# Patient Record
Sex: FEMALE | Race: WHITE | NOT HISPANIC OR LATINO | Employment: OTHER | URBAN - NONMETROPOLITAN AREA
[De-identification: names, ages, dates, MRNs, and addresses within clinical notes are randomized per-mention and may not be internally consistent; named-entity substitution may affect disease eponyms.]

---

## 2017-01-05 ENCOUNTER — TRANSCRIBE ORDERS (OUTPATIENT)
Dept: ADMINISTRATIVE | Facility: HOSPITAL | Age: 72
End: 2017-01-05

## 2017-01-05 ENCOUNTER — LAB (OUTPATIENT)
Dept: LAB | Facility: HOSPITAL | Age: 72
End: 2017-01-05
Attending: INTERNAL MEDICINE

## 2017-01-05 DIAGNOSIS — D37.6 NEOPLASM OF UNCERTAIN BEHAVIOR OF LIVER AND BILIARY PASSAGES: ICD-10-CM

## 2017-01-05 DIAGNOSIS — C22.1 MALIGNANT NEOPLASM OF INTRAHEPATIC BILE DUCTS (HCC): ICD-10-CM

## 2017-01-05 DIAGNOSIS — D37.6 NEOPLASM OF UNCERTAIN BEHAVIOR OF LIVER AND BILIARY PASSAGES: Primary | ICD-10-CM

## 2017-01-05 LAB
ALBUMIN SERPL-MCNC: 4 G/DL (ref 3.5–5)
ALBUMIN/GLOB SERPL: 1.2 G/DL (ref 1.1–2.5)
ALP SERPL-CCNC: 347 U/L (ref 24–120)
ALT SERPL W P-5'-P-CCNC: 95 U/L (ref 0–54)
ANION GAP SERPL CALCULATED.3IONS-SCNC: 11 MMOL/L (ref 4–13)
AST SERPL-CCNC: 88 U/L (ref 7–45)
AUTO MIXED CELLS #: 0.2 10*3/UL (ref 0.1–2.6)
AUTO MIXED CELLS %: 3.2 % (ref 0.1–24)
BILIRUB CONJ SERPL-MCNC: 0 MG/DL (ref 0–0.3)
BILIRUB SERPL-MCNC: 0.4 MG/DL (ref 0.1–1)
BUN BLD-MCNC: 9 MG/DL (ref 5–21)
BUN/CREAT SERPL: 17.6
CALCIUM SPEC-SCNC: 8.4 MG/DL (ref 8.4–10.4)
CHLORIDE SERPL-SCNC: 99 MMOL/L (ref 98–110)
CO2 SERPL-SCNC: 29 MMOL/L (ref 24–31)
CREAT BLD-MCNC: 0.51 MG/DL (ref 0.5–1.4)
ERYTHROCYTE [DISTWIDTH] IN BLOOD BY AUTOMATED COUNT: 13.2 % (ref 12–15)
GFR SERPL CREATININE-BSD FRML MDRD: 120 ML/MIN/1.73
GLOBULIN UR ELPH-MCNC: 3.3 GM/DL
GLUCOSE BLD-MCNC: 187 MG/DL (ref 70–100)
HCT VFR BLD AUTO: 35.3 % (ref 37–47)
HGB BLD-MCNC: 12.2 G/DL (ref 12–16)
LYMPHOCYTES # BLD AUTO: 1 10*3/MM3 (ref 0.8–7)
LYMPHOCYTES NFR BLD AUTO: 19.7 % (ref 15–45)
MCH RBC QN AUTO: 30.7 PG (ref 28–32)
MCHC RBC AUTO-ENTMCNC: 34.6 G/DL (ref 33–36)
MCV RBC AUTO: 88.9 FL (ref 82–98)
NEUTROPHILS # BLD AUTO: 3.8 10*3/MM3 (ref 1.5–8.3)
NEUTROPHILS NFR BLD AUTO: 77.1 % (ref 39–78)
PLATELET # BLD AUTO: 226 10*3/MM3 (ref 130–400)
PMV BLD AUTO: 9.9 FL (ref 6–12)
POTASSIUM BLD-SCNC: 4 MMOL/L (ref 3.5–5.3)
PROT SERPL-MCNC: 7.3 G/DL (ref 6.3–8.7)
RBC # BLD AUTO: 3.97 10*6/MM3 (ref 4.2–5.4)
SODIUM BLD-SCNC: 139 MMOL/L (ref 135–145)
WBC NRBC COR # BLD: 5 10*3/MM3 (ref 4.8–10.8)

## 2017-01-05 PROCEDURE — 36415 COLL VENOUS BLD VENIPUNCTURE: CPT | Performed by: INTERNAL MEDICINE

## 2017-01-05 PROCEDURE — 80053 COMPREHEN METABOLIC PANEL: CPT

## 2017-01-05 PROCEDURE — 82248 BILIRUBIN DIRECT: CPT

## 2017-01-05 PROCEDURE — 85025 COMPLETE CBC W/AUTO DIFF WBC: CPT

## 2017-01-05 PROCEDURE — 82378 CARCINOEMBRYONIC ANTIGEN: CPT | Performed by: INTERNAL MEDICINE

## 2017-01-05 PROCEDURE — 86301 IMMUNOASSAY TUMOR CA 19-9: CPT | Performed by: INTERNAL MEDICINE

## 2017-01-27 ENCOUNTER — TRANSCRIBE ORDERS (OUTPATIENT)
Dept: GENERAL RADIOLOGY | Facility: HOSPITAL | Age: 72
End: 2017-01-27

## 2017-01-27 ENCOUNTER — LAB (OUTPATIENT)
Dept: LAB | Facility: HOSPITAL | Age: 72
End: 2017-01-27
Attending: INTERNAL MEDICINE

## 2017-01-27 DIAGNOSIS — D64.9 ANEMIA, UNSPECIFIED: ICD-10-CM

## 2017-01-27 DIAGNOSIS — D37.6 NEOPLASM OF UNCERTAIN BEHAVIOR OF LIVER AND BILIARY PASSAGES: ICD-10-CM

## 2017-01-27 DIAGNOSIS — D64.9 ANEMIA, UNSPECIFIED: Primary | ICD-10-CM

## 2017-01-27 LAB
ALBUMIN SERPL-MCNC: 4.2 G/DL (ref 3.5–5)
ALBUMIN/GLOB SERPL: 1.4 G/DL (ref 1.1–2.5)
ALP SERPL-CCNC: 250 U/L (ref 24–120)
ALT SERPL W P-5'-P-CCNC: 225 U/L (ref 0–54)
ANION GAP SERPL CALCULATED.3IONS-SCNC: 10 MMOL/L (ref 4–13)
AST SERPL-CCNC: 328 U/L (ref 7–45)
AUTO MIXED CELLS #: 0.4 10*3/UL (ref 0.1–2.6)
AUTO MIXED CELLS %: 9.9 % (ref 0.1–24)
BILIRUB CONJ SERPL-MCNC: 0 MG/DL (ref 0–0.3)
BILIRUB SERPL-MCNC: 0.7 MG/DL (ref 0.1–1)
BUN BLD-MCNC: 9 MG/DL (ref 5–21)
BUN/CREAT SERPL: 17.6
CALCIUM SPEC-SCNC: 8.6 MG/DL (ref 8.4–10.4)
CEA SERPL-MCNC: 3.47 NG/ML (ref 0–5)
CHLORIDE SERPL-SCNC: 98 MMOL/L (ref 98–110)
CO2 SERPL-SCNC: 30 MMOL/L (ref 24–31)
CREAT BLD-MCNC: 0.51 MG/DL (ref 0.5–1.4)
ERYTHROCYTE [DISTWIDTH] IN BLOOD BY AUTOMATED COUNT: 13.4 % (ref 12–15)
GFR SERPL CREATININE-BSD FRML MDRD: 120 ML/MIN/1.73
GLOBULIN UR ELPH-MCNC: 3 GM/DL
GLUCOSE BLD-MCNC: 86 MG/DL (ref 70–100)
HCT VFR BLD AUTO: 35.3 % (ref 37–47)
HGB BLD-MCNC: 12.3 G/DL (ref 12–16)
LYMPHOCYTES # BLD AUTO: 0.8 10*3/MM3 (ref 0.8–7)
LYMPHOCYTES NFR BLD AUTO: 19.3 % (ref 15–45)
MCH RBC QN AUTO: 31.1 PG (ref 28–32)
MCHC RBC AUTO-ENTMCNC: 34.8 G/DL (ref 33–36)
MCV RBC AUTO: 89.1 FL (ref 82–98)
NEUTROPHILS # BLD AUTO: 3.2 10*3/MM3 (ref 1.5–8.3)
NEUTROPHILS NFR BLD AUTO: 70.8 % (ref 39–78)
PLATELET # BLD AUTO: 188 10*3/MM3 (ref 130–400)
PMV BLD AUTO: 10.5 FL (ref 6–12)
POTASSIUM BLD-SCNC: 4.7 MMOL/L (ref 3.5–5.3)
PROT SERPL-MCNC: 7.2 G/DL (ref 6.3–8.7)
RBC # BLD AUTO: 3.96 10*6/MM3 (ref 4.2–5.4)
SODIUM BLD-SCNC: 138 MMOL/L (ref 135–145)
WBC NRBC COR # BLD: 4.4 10*3/MM3 (ref 4.8–10.8)

## 2017-01-27 PROCEDURE — 82248 BILIRUBIN DIRECT: CPT

## 2017-01-27 PROCEDURE — 85025 COMPLETE CBC W/AUTO DIFF WBC: CPT | Performed by: INTERNAL MEDICINE

## 2017-01-27 PROCEDURE — 82378 CARCINOEMBRYONIC ANTIGEN: CPT | Performed by: INTERNAL MEDICINE

## 2017-01-27 PROCEDURE — 36415 COLL VENOUS BLD VENIPUNCTURE: CPT

## 2017-01-27 PROCEDURE — 80053 COMPREHEN METABOLIC PANEL: CPT | Performed by: INTERNAL MEDICINE

## 2017-01-27 PROCEDURE — 86301 IMMUNOASSAY TUMOR CA 19-9: CPT | Performed by: INTERNAL MEDICINE

## 2017-01-28 LAB — CANCER AG19-9 SERPL-ACNC: 54 U/ML (ref 0–35)

## 2017-02-15 ENCOUNTER — LAB (OUTPATIENT)
Dept: LAB | Facility: HOSPITAL | Age: 72
End: 2017-02-15

## 2017-02-15 ENCOUNTER — TRANSCRIBE ORDERS (OUTPATIENT)
Dept: GENERAL RADIOLOGY | Facility: HOSPITAL | Age: 72
End: 2017-02-15

## 2017-02-15 DIAGNOSIS — D64.9 ANEMIA, UNSPECIFIED: ICD-10-CM

## 2017-02-15 DIAGNOSIS — D37.6 NEOPLASM OF UNCERTAIN BEHAVIOR OF LIVER, GALLBLADDER AND BILE DUCTS: Primary | ICD-10-CM

## 2017-02-15 DIAGNOSIS — D37.6 NEOPLASM OF UNCERTAIN BEHAVIOR OF LIVER, GALLBLADDER AND BILE DUCTS: ICD-10-CM

## 2017-02-15 LAB
ALBUMIN SERPL-MCNC: 3.8 G/DL (ref 3.5–5)
ALBUMIN SERPL-MCNC: 3.8 G/DL (ref 3.5–5)
ALBUMIN/GLOB SERPL: 1.2 G/DL (ref 1.1–2.5)
ALP SERPL-CCNC: 230 U/L (ref 24–120)
ALP SERPL-CCNC: 230 U/L (ref 24–120)
ALT SERPL W P-5'-P-CCNC: 222 U/L (ref 0–54)
ALT SERPL W P-5'-P-CCNC: 222 U/L (ref 0–54)
ANION GAP SERPL CALCULATED.3IONS-SCNC: 11 MMOL/L (ref 4–13)
AST SERPL-CCNC: 390 U/L (ref 7–45)
AST SERPL-CCNC: 390 U/L (ref 7–45)
BASOPHILS # BLD AUTO: 0.01 10*3/MM3 (ref 0–0.2)
BASOPHILS NFR BLD AUTO: 0.2 % (ref 0–2)
BILIRUB CONJ SERPL-MCNC: 0 MG/DL (ref 0–0.3)
BILIRUB INDIRECT SERPL-MCNC: 0.3 MG/DL (ref 0–1.1)
BILIRUB SERPL-MCNC: 0.8 MG/DL (ref 0.1–1)
BILIRUB SERPL-MCNC: 0.8 MG/DL (ref 0.1–1)
BUN BLD-MCNC: 15 MG/DL (ref 5–21)
BUN/CREAT SERPL: 28.3
CALCIUM SPEC-SCNC: 8.5 MG/DL (ref 8.4–10.4)
CEA SERPL-MCNC: 3.43 NG/ML (ref 0–5)
CHLORIDE SERPL-SCNC: 102 MMOL/L (ref 98–110)
CO2 SERPL-SCNC: 27 MMOL/L (ref 24–31)
CREAT BLD-MCNC: 0.53 MG/DL (ref 0.5–1.4)
DEPRECATED RDW RBC AUTO: 45.2 FL (ref 40–54)
EOSINOPHIL # BLD AUTO: 0.07 10*3/MM3 (ref 0–0.7)
EOSINOPHIL NFR BLD AUTO: 1.4 % (ref 0–4)
ERYTHROCYTE [DISTWIDTH] IN BLOOD BY AUTOMATED COUNT: 13.7 % (ref 12–15)
GFR SERPL CREATININE-BSD FRML MDRD: 114 ML/MIN/1.73
GLOBULIN UR ELPH-MCNC: 3.2 GM/DL
GLUCOSE BLD-MCNC: 112 MG/DL (ref 70–100)
HCT VFR BLD AUTO: 35.6 % (ref 37–47)
HGB BLD-MCNC: 11.9 G/DL (ref 12–16)
IMM GRANULOCYTES # BLD: 0.01 10*3/MM3 (ref 0–0.03)
IMM GRANULOCYTES NFR BLD: 0.2 % (ref 0–5)
LYMPHOCYTES # BLD AUTO: 1.1 10*3/MM3 (ref 0.72–4.86)
LYMPHOCYTES NFR BLD AUTO: 22 % (ref 15–45)
MCH RBC QN AUTO: 30.1 PG (ref 28–32)
MCHC RBC AUTO-ENTMCNC: 33.4 G/DL (ref 33–36)
MCV RBC AUTO: 89.9 FL (ref 82–98)
MONOCYTES # BLD AUTO: 0.63 10*3/MM3 (ref 0.19–1.3)
MONOCYTES NFR BLD AUTO: 12.6 % (ref 4–12)
NEUTROPHILS # BLD AUTO: 3.18 10*3/MM3 (ref 1.87–8.4)
NEUTROPHILS NFR BLD AUTO: 63.6 % (ref 39–78)
PLATELET # BLD AUTO: 223 10*3/MM3 (ref 130–400)
PMV BLD AUTO: 11.9 FL (ref 6–12)
POTASSIUM BLD-SCNC: 4.5 MMOL/L (ref 3.5–5.3)
PROT SERPL-MCNC: 7 G/DL (ref 6.3–8.7)
PROT SERPL-MCNC: 7 G/DL (ref 6.3–8.7)
RBC # BLD AUTO: 3.96 10*6/MM3 (ref 4.2–5.4)
SODIUM BLD-SCNC: 140 MMOL/L (ref 135–145)
WBC NRBC COR # BLD: 5 10*3/MM3 (ref 4.8–10.8)

## 2017-02-15 PROCEDURE — 86301 IMMUNOASSAY TUMOR CA 19-9: CPT | Performed by: INTERNAL MEDICINE

## 2017-02-15 PROCEDURE — 80076 HEPATIC FUNCTION PANEL: CPT

## 2017-02-15 PROCEDURE — 36415 COLL VENOUS BLD VENIPUNCTURE: CPT

## 2017-02-15 PROCEDURE — 80053 COMPREHEN METABOLIC PANEL: CPT

## 2017-02-15 PROCEDURE — 82378 CARCINOEMBRYONIC ANTIGEN: CPT | Performed by: INTERNAL MEDICINE

## 2017-02-15 PROCEDURE — 85025 COMPLETE CBC W/AUTO DIFF WBC: CPT | Performed by: INTERNAL MEDICINE

## 2017-02-15 PROCEDURE — 82248 BILIRUBIN DIRECT: CPT

## 2017-02-17 LAB — CANCER AG19-9 SERPL-ACNC: 62 U/ML (ref 0–35)

## 2018-03-02 ENCOUNTER — TRANSCRIBE ORDERS (OUTPATIENT)
Dept: ADMINISTRATIVE | Facility: HOSPITAL | Age: 73
End: 2018-03-02

## 2018-03-02 ENCOUNTER — LAB (OUTPATIENT)
Dept: LAB | Facility: HOSPITAL | Age: 73
End: 2018-03-02
Attending: INTERNAL MEDICINE

## 2018-03-02 DIAGNOSIS — R94.5 ABNORMAL RESULTS OF LIVER FUNCTION STUDIES: Primary | ICD-10-CM

## 2018-03-02 DIAGNOSIS — R94.5 ABNORMAL RESULTS OF LIVER FUNCTION STUDIES: ICD-10-CM

## 2018-03-02 LAB
ALBUMIN SERPL-MCNC: 4.1 G/DL (ref 3.5–5)
ALBUMIN/GLOB SERPL: 1.1 G/DL (ref 1.1–2.5)
ALP SERPL-CCNC: 467 U/L (ref 24–120)
ALT SERPL W P-5'-P-CCNC: 195 U/L (ref 0–54)
ANION GAP SERPL CALCULATED.3IONS-SCNC: 11 MMOL/L (ref 4–13)
AST SERPL-CCNC: 191 U/L (ref 7–45)
BACTERIA UR QL AUTO: ABNORMAL /HPF
BILIRUB CONJ SERPL-MCNC: 0 MG/DL (ref 0–0.3)
BILIRUB SERPL-MCNC: 2 MG/DL (ref 0.1–1)
BILIRUB UR QL STRIP: ABNORMAL
BUN BLD-MCNC: 11 MG/DL (ref 5–21)
BUN/CREAT SERPL: 17.5
CALCIUM SPEC-SCNC: 9.3 MG/DL (ref 8.4–10.4)
CHLORIDE SERPL-SCNC: 96 MMOL/L (ref 98–110)
CLARITY UR: CLEAR
CO2 SERPL-SCNC: 31 MMOL/L (ref 24–31)
COLOR UR: YELLOW
CREAT BLD-MCNC: 0.63 MG/DL (ref 0.5–1.4)
GFR SERPL CREATININE-BSD FRML MDRD: 93 ML/MIN/1.73
GLOBULIN UR ELPH-MCNC: 3.8 GM/DL
GLUCOSE BLD-MCNC: 128 MG/DL (ref 70–100)
GLUCOSE UR STRIP-MCNC: NEGATIVE MG/DL
HGB UR QL STRIP.AUTO: ABNORMAL
HYALINE CASTS UR QL AUTO: ABNORMAL /LPF
KETONES UR QL STRIP: NEGATIVE
LEUKOCYTE ESTERASE UR QL STRIP.AUTO: NEGATIVE
MUCOUS THREADS URNS QL MICRO: ABNORMAL /HPF
NITRITE UR QL STRIP: POSITIVE
PH UR STRIP.AUTO: 6.5 [PH] (ref 5–8)
POTASSIUM BLD-SCNC: 4.3 MMOL/L (ref 3.5–5.3)
PROT SERPL-MCNC: 7.9 G/DL (ref 6.3–8.7)
PROT UR QL STRIP: ABNORMAL
RBC # UR: ABNORMAL /HPF
REF LAB TEST METHOD: ABNORMAL
SODIUM BLD-SCNC: 138 MMOL/L (ref 135–145)
SP GR UR STRIP: 1.02 (ref 1–1.03)
SQUAMOUS #/AREA URNS HPF: ABNORMAL /HPF
UROBILINOGEN UR QL STRIP: ABNORMAL
WBC UR QL AUTO: ABNORMAL /HPF

## 2018-03-02 PROCEDURE — 82248 BILIRUBIN DIRECT: CPT | Performed by: INTERNAL MEDICINE

## 2018-03-02 PROCEDURE — 36415 COLL VENOUS BLD VENIPUNCTURE: CPT

## 2018-03-02 PROCEDURE — 80053 COMPREHEN METABOLIC PANEL: CPT

## 2018-03-02 PROCEDURE — 87086 URINE CULTURE/COLONY COUNT: CPT | Performed by: INTERNAL MEDICINE

## 2018-03-02 PROCEDURE — 81001 URINALYSIS AUTO W/SCOPE: CPT | Performed by: INTERNAL MEDICINE

## 2018-03-04 LAB
BACTERIA SPEC AEROBE CULT: ABNORMAL

## 2018-03-05 ENCOUNTER — LAB (OUTPATIENT)
Dept: LAB | Facility: HOSPITAL | Age: 73
End: 2018-03-05
Attending: INTERNAL MEDICINE

## 2018-03-05 ENCOUNTER — TRANSCRIBE ORDERS (OUTPATIENT)
Dept: ADMINISTRATIVE | Facility: HOSPITAL | Age: 73
End: 2018-03-05

## 2018-03-05 DIAGNOSIS — R94.5 ABNORMAL RESULTS OF LIVER FUNCTION STUDIES: ICD-10-CM

## 2018-03-05 DIAGNOSIS — C22.1 INTRAHEPATIC BILE DUCT CARCINOMA (HCC): Primary | ICD-10-CM

## 2018-03-05 DIAGNOSIS — C22.1 INTRAHEPATIC BILE DUCT CARCINOMA (HCC): ICD-10-CM

## 2018-03-05 LAB
ALBUMIN SERPL-MCNC: 4.1 G/DL (ref 3.5–5)
ALBUMIN/GLOB SERPL: 1.1 G/DL (ref 1.1–2.5)
ALP SERPL-CCNC: 454 U/L (ref 24–120)
ALT SERPL W P-5'-P-CCNC: 115 U/L (ref 0–54)
ANION GAP SERPL CALCULATED.3IONS-SCNC: 12 MMOL/L (ref 4–13)
AST SERPL-CCNC: 83 U/L (ref 7–45)
AUTO MIXED CELLS #: 0.5 10*3/MM3 (ref 0.1–2.6)
AUTO MIXED CELLS %: 7.7 % (ref 0.1–24)
BILIRUB CONJ SERPL-MCNC: 0 MG/DL (ref 0–0.3)
BILIRUB SERPL-MCNC: 0.8 MG/DL (ref 0.1–1)
BUN BLD-MCNC: 14 MG/DL (ref 5–21)
BUN/CREAT SERPL: 24.1
CALCIUM SPEC-SCNC: 9.6 MG/DL (ref 8.4–10.4)
CHLORIDE SERPL-SCNC: 96 MMOL/L (ref 98–110)
CO2 SERPL-SCNC: 29 MMOL/L (ref 24–31)
CREAT BLD-MCNC: 0.58 MG/DL (ref 0.5–1.4)
ERYTHROCYTE [DISTWIDTH] IN BLOOD BY AUTOMATED COUNT: 14.1 % (ref 12–15)
GFR SERPL CREATININE-BSD FRML MDRD: 103 ML/MIN/1.73
GLOBULIN UR ELPH-MCNC: 3.7 GM/DL
GLUCOSE BLD-MCNC: 116 MG/DL (ref 70–100)
HCT VFR BLD AUTO: 39.7 % (ref 37–47)
HGB BLD-MCNC: 13.4 G/DL (ref 12–16)
LYMPHOCYTES # BLD AUTO: 1.3 10*3/MM3 (ref 0.8–7)
LYMPHOCYTES NFR BLD AUTO: 21 % (ref 15–45)
MCH RBC QN AUTO: 30.5 PG (ref 28–32)
MCHC RBC AUTO-ENTMCNC: 33.8 G/DL (ref 33–36)
MCV RBC AUTO: 90.2 FL (ref 82–98)
NEUTROPHILS # BLD AUTO: 4.2 10*3/MM3 (ref 1.5–8.3)
NEUTROPHILS NFR BLD AUTO: 71.3 % (ref 39–78)
PLATELET # BLD AUTO: 277 10*3/MM3 (ref 130–400)
PMV BLD AUTO: 10.4 FL (ref 6–12)
POTASSIUM BLD-SCNC: 4.4 MMOL/L (ref 3.5–5.3)
PROT SERPL-MCNC: 7.8 G/DL (ref 6.3–8.7)
RBC # BLD AUTO: 4.4 10*6/MM3 (ref 4.2–5.4)
SODIUM BLD-SCNC: 137 MMOL/L (ref 135–145)
WBC NRBC COR # BLD: 6 10*3/MM3 (ref 4.8–10.8)

## 2018-03-05 PROCEDURE — 82248 BILIRUBIN DIRECT: CPT | Performed by: INTERNAL MEDICINE

## 2018-03-05 PROCEDURE — 85025 COMPLETE CBC W/AUTO DIFF WBC: CPT

## 2018-03-05 PROCEDURE — 36415 COLL VENOUS BLD VENIPUNCTURE: CPT

## 2018-03-05 PROCEDURE — 80053 COMPREHEN METABOLIC PANEL: CPT

## 2018-06-29 ENCOUNTER — LAB (OUTPATIENT)
Dept: LAB | Facility: HOSPITAL | Age: 73
End: 2018-06-29

## 2018-06-29 ENCOUNTER — TRANSCRIBE ORDERS (OUTPATIENT)
Dept: ADMINISTRATIVE | Facility: HOSPITAL | Age: 73
End: 2018-06-29

## 2018-06-29 DIAGNOSIS — C24.0 MALIGNANT NEOPLASM OF BILE DUCTS (HCC): ICD-10-CM

## 2018-06-29 DIAGNOSIS — C24.0 MALIGNANT NEOPLASM OF BILE DUCTS (HCC): Primary | ICD-10-CM

## 2018-06-29 LAB
ALBUMIN SERPL-MCNC: 3.7 G/DL (ref 3.5–5)
ALBUMIN/GLOB SERPL: 1.3 G/DL (ref 1.1–2.5)
ALP SERPL-CCNC: 378 U/L (ref 24–120)
ALT SERPL W P-5'-P-CCNC: 118 U/L (ref 0–54)
ANION GAP SERPL CALCULATED.3IONS-SCNC: 10 MMOL/L (ref 4–13)
AST SERPL-CCNC: 174 U/L (ref 7–45)
AUTO MIXED CELLS #: 0.5 10*3/MM3 (ref 0.1–2.6)
AUTO MIXED CELLS %: 10.5 % (ref 0.1–24)
BILIRUB SERPL-MCNC: 0.4 MG/DL (ref 0.1–1)
BUN BLD-MCNC: 9 MG/DL (ref 5–21)
BUN/CREAT SERPL: 16.4
CALCIUM SPEC-SCNC: 8.3 MG/DL (ref 8.4–10.4)
CHLORIDE SERPL-SCNC: 101 MMOL/L (ref 98–110)
CO2 SERPL-SCNC: 28 MMOL/L (ref 24–31)
CREAT BLD-MCNC: 0.55 MG/DL (ref 0.5–1.4)
ERYTHROCYTE [DISTWIDTH] IN BLOOD BY AUTOMATED COUNT: 13.9 % (ref 12–15)
GFR SERPL CREATININE-BSD FRML MDRD: 109 ML/MIN/1.73
GLOBULIN UR ELPH-MCNC: 2.8 GM/DL
GLUCOSE BLD-MCNC: 186 MG/DL (ref 70–100)
HCT VFR BLD AUTO: 34.6 % (ref 37–47)
HGB BLD-MCNC: 11.6 G/DL (ref 12–16)
LYMPHOCYTES # BLD AUTO: 1.2 10*3/MM3 (ref 0.8–7)
LYMPHOCYTES NFR BLD AUTO: 24.3 % (ref 15–45)
MCH RBC QN AUTO: 30.6 PG (ref 28–32)
MCHC RBC AUTO-ENTMCNC: 33.5 G/DL (ref 33–36)
MCV RBC AUTO: 91.3 FL (ref 82–98)
NEUTROPHILS # BLD AUTO: 3.2 10*3/MM3 (ref 1.5–8.3)
NEUTROPHILS NFR BLD AUTO: 65.2 % (ref 39–78)
PLATELET # BLD AUTO: 206 10*3/MM3 (ref 130–400)
PMV BLD AUTO: 10.2 FL (ref 6–12)
POTASSIUM BLD-SCNC: 3.9 MMOL/L (ref 3.5–5.3)
PROT SERPL-MCNC: 6.5 G/DL (ref 6.3–8.7)
RBC # BLD AUTO: 3.79 10*6/MM3 (ref 4.2–5.4)
SODIUM BLD-SCNC: 139 MMOL/L (ref 135–145)
WBC NRBC COR # BLD: 4.9 10*3/MM3 (ref 4.8–10.8)

## 2018-06-29 PROCEDURE — 36415 COLL VENOUS BLD VENIPUNCTURE: CPT

## 2018-06-29 PROCEDURE — 80053 COMPREHEN METABOLIC PANEL: CPT | Performed by: INTERNAL MEDICINE

## 2018-06-29 PROCEDURE — 85025 COMPLETE CBC W/AUTO DIFF WBC: CPT | Performed by: INTERNAL MEDICINE

## 2018-06-29 PROCEDURE — 86301 IMMUNOASSAY TUMOR CA 19-9: CPT | Performed by: INTERNAL MEDICINE

## 2018-06-30 LAB — CANCER AG19-9 SERPL-ACNC: 59 U/ML (ref 0–35)

## 2018-10-25 ENCOUNTER — OFFICE VISIT (OUTPATIENT)
Dept: GASTROENTEROLOGY | Facility: CLINIC | Age: 73
End: 2018-10-25

## 2018-10-25 VITALS
SYSTOLIC BLOOD PRESSURE: 122 MMHG | OXYGEN SATURATION: 98 % | BODY MASS INDEX: 21.53 KG/M2 | HEIGHT: 62 IN | HEART RATE: 73 BPM | DIASTOLIC BLOOD PRESSURE: 70 MMHG | WEIGHT: 117 LBS

## 2018-10-25 DIAGNOSIS — Z86.010 HX OF COLONIC POLYPS: Primary | ICD-10-CM

## 2018-10-25 DIAGNOSIS — I10 HTN (HYPERTENSION), BENIGN: ICD-10-CM

## 2018-10-25 PROBLEM — Z86.0100 HX OF COLONIC POLYPS: Status: ACTIVE | Noted: 2018-10-25

## 2018-10-25 PROCEDURE — S0260 H&P FOR SURGERY: HCPCS | Performed by: CLINICAL NURSE SPECIALIST

## 2018-10-25 RX ORDER — BUTALBITAL, ACETAMINOPHEN AND CAFFEINE 50; 325; 40 MG/1; MG/1; MG/1
1 TABLET ORAL 2 TIMES DAILY
Refills: 0 | COMMUNITY
Start: 2018-09-28

## 2018-10-25 RX ORDER — LISINOPRIL 10 MG/1
TABLET ORAL
COMMUNITY
Start: 2018-09-17

## 2018-10-25 RX ORDER — LORAZEPAM 1 MG/1
1 TABLET ORAL EVERY 8 HOURS PRN
COMMUNITY
Start: 2018-10-18

## 2018-10-25 RX ORDER — SODIUM, POTASSIUM,MAG SULFATES 17.5-3.13G
SOLUTION, RECONSTITUTED, ORAL ORAL
Qty: 2 BOTTLE | Refills: 0 | Status: SHIPPED | OUTPATIENT
Start: 2018-10-25 | End: 2021-09-02

## 2018-10-25 RX ORDER — MONTELUKAST SODIUM 10 MG/1
TABLET ORAL
COMMUNITY
Start: 2018-08-27 | End: 2021-12-07

## 2018-10-25 RX ORDER — CYANOCOBALAMIN 1000 UG/ML
INJECTION, SOLUTION INTRAMUSCULAR; SUBCUTANEOUS
Refills: 2 | COMMUNITY
Start: 2018-10-11

## 2018-10-25 NOTE — PROGRESS NOTES
Candice Kingsley  1945      10/25/2018  Chief Complaint   Patient presents with   • Colonoscopy     Subjective   HPI  Candice Kingsley is a 72 y.o. female who presents as a referral for preventative maintenance. She has no complaints of nausea or vomiting. No change in bowels. No wt loss. No BRBPR. No melena. There is NO family hx for colon cancer. No abdominal pain. She has a hx of stent placed by Dr Andrews most recently Feb 2017. She is followed by him for her hx of malignant neoplasm of the biliary tract. She has a  CT scheduled November 14th.    Past Medical History:   Diagnosis Date   • Asthma    • Cancer (CMS/HCC)     Cholangiocarcinoma   • Hypertension      Past Surgical History:   Procedure Laterality Date   • CHOLECYSTECTOMY     • COLONOSCOPY  05/22/2013    Diverticulosis, Hemorrhoids repeat exam in 5 years   • COLONOSCOPY W/ POLYPECTOMY  07/26/2010    Hyperplastic polyp cecum, pan-diverticular disease repeat exam in 3 years   • COMMON BILE DUCT EXPLORATION      For carcinoma of the bile duct   • ENDOSCOPY  02/01/2016    Normal exam     Outpatient Prescriptions Marked as Taking for the 10/25/18 encounter (Office Visit) with Rosana Ding APRN   Medication Sig Dispense Refill   • butalbital-acetaminophen-caffeine (FIORICET, ESGIC) -40 MG per tablet Take 1 tablet by mouth 2 (Two) Times a Day.  0   • cyanocobalamin 1000 MCG/ML injection INJECT 1 ML ONCE A MONTH  2   • lisinopril (PRINIVIL,ZESTRIL) 10 MG tablet      • LORazepam (ATIVAN) 1 MG tablet      • montelukast (SINGULAIR) 10 MG tablet        Allergies   Allergen Reactions   • Cephalosporins Rash     Social History     Social History   • Marital status:      Spouse name: N/A   • Number of children: N/A   • Years of education: N/A     Occupational History   • Not on file.     Social History Main Topics   • Smoking status: Former Smoker   • Smokeless tobacco: Never Used   • Alcohol use Yes      Comment: Occasional   • Drug use: Unknown  "  • Sexual activity: Not on file     Other Topics Concern   • Not on file     Social History Narrative   • No narrative on file     Family History   Problem Relation Age of Onset   • Colon cancer Neg Hx    • Colon polyps Neg Hx      Health Maintenance   Topic Date Due   • URINE MICROALBUMIN  1945   • TDAP/TD VACCINES (1 - Tdap) 11/06/1966   • ZOSTER VACCINE (1 of 2) 11/06/1997   • PNEUMOCOCCAL VACCINES (65+ LOW/MEDIUM RISK) (1 of 2 - PCV13) 11/06/2012   • HEPATITIS C SCREENING  02/08/2018   • MEDICARE ANNUAL WELLNESS  02/08/2018   • DIABETIC FOOT EXAM  02/08/2018   • HEMOGLOBIN A1C  02/08/2018   • DIABETIC EYE EXAM  02/08/2018   • INFLUENZA VACCINE  08/01/2018   • MAMMOGRAM  01/23/2019   • COLONOSCOPY  05/22/2023       REVIEW OF SYSTEMS  General: well appearing, no fever chills or sweats, no unexplained wt loss  HEENT: no acute visual or hearing disturbances  Cardiovascular: No chest pain or palpitations  Pulmonary: No shortness of breath, coughing, wheezing or hemoptysis  : No burning, urgency, hematuria, or dysuria  Musculoskeletal: No joint pain or stiffness  Peripheral: no edema  Skin: No lesions or rashes  Neuro: No dizziness, headaches, stroke, syncope  Endocrine: No hot or cold intolerances  Hematological: No blood dyscrasias    Objective   Vitals:    10/25/18 1407   BP: 122/70   Pulse: 73   SpO2: 98%   Weight: 53.1 kg (117 lb)   Height: 157.5 cm (62\")     Body mass index is 21.4 kg/m².  Patient's Body mass index is 21.4 kg/m². BMI is within normal parameters. No follow-up required.      PHYSICAL EXAM  General: age appropriate well nourished well appearing, no acute distress  Head: normocephalic and atraumatic  Global assessment-supple  Neck-No JVD noted, no lymphadenopathy  Pulmonary-clear to auscultation bilaterally, normal respiratory effort  Cardiovascular-normal rate and rhythm, normal heart sounds, S1 and S2 noted  Abdomen-soft, non tender, non distended, normal bowel sounds all 4 quadrants, no " hepatosplenomegaly noted  Extremities-No clubbing cyanosis or edema  Neuro-Non focal, converses appropriately, awake, alert, oriented    Assessment/Plan     Candice was seen today for colonoscopy.    Diagnoses and all orders for this visit:    Hx of colonic polyps  -     Case Request; Standing  -     Follow Anesthesia Guidelines / Standing Orders; Future  -     Implement Anesthesia Orders Day of Procedure; Standing  -     Obtain Informed Consent; Standing  -     Verify bowel prep was successful; Standing  -     Case Request  -     SUPREP BOWEL PREP KIT 17.5-3.13-1.6 GM/177ML solution oral solution; Take as directed by office instructions provided    HTN (hypertension), benign  Comments:  cont BP medication the day of procedure      Hx of carcinoma of the bile duct She has a new catheter in place that was placed last 2017 will get clearance for procedure prior.     COLONOSCOPY WITH ANESTHESIA (N/A)  Body mass index is 21.4 kg/m².    Patient instructions on prep prior to procedure provided to the patient.    All risks, benefits, alternatives, and indications of colonoscopy procedure have been discussed with the patient. Risks to include perforation of the colon requiring possible surgery or colostomy, risk of bleeding from biopsies or removal of colon tissue, possibility of missing a colon polyp or cancer, or adverse drug reaction.  Benefits to include the diagnosis and management of disease of the colon and rectum. Alternatives to include barium enema, radiographic evaluation, lab testing or no intervention. Pt verbalizes understanding and agrees.     Rosana Ding, APRN  10/25/2018  2:52 PM      IF YOU SMOKE OR USE TOBACCO PLEASE READ THE FOLLOWIN minutes reading provided    Why is smoking bad for me?  Smoking increases the risk of heart disease, lung disease, vascular disease, stroke, and cancer.     If you smoke, STOP!    If you would like more information on quitting smoking, please visit the  [employer] website: [email or web address]   This link will provide additional resources including the QUIT line and the [employer] support groups.     For more information:    Quit Now [place]  1-800-QUIT-NOW  https://[place].[employer].org/en-[place]/    Obesity, Adult  Obesity is the condition of having too much total body fat. Being overweight or obese means that your weight is greater than what is considered healthy for your body size. Obesity is determined by a measurement called BMI. BMI is an estimate of body fat and is calculated from height and weight. For adults, a BMI of 30 or higher is considered obese.  Obesity can eventually lead to other health concerns and major illnesses, including:  · Stroke.  · Coronary artery disease (CAD).  · Type 2 diabetes.  · Some types of cancer, including cancers of the colon, breast, uterus, and gallbladder.  · Osteoarthritis.  · High blood pressure (hypertension).  · High cholesterol.  · Sleep apnea.  · Gallbladder stones.  · Infertility problems.  What are the causes?  The main cause of obesity is taking in (consuming) more calories than your body uses for energy. Other factors that contribute to this condition may include:  · Being born with genes that make you more likely to become obese.  · Having a medical condition that causes obesity. These conditions include:  ¨ Hypothyroidism.  ¨ Polycystic ovarian syndrome (PCOS).  ¨ Binge-eating disorder.  ¨ Cushing syndrome.  · Taking certain medicines, such as steroids, antidepressants, and seizure medicines.  · Not being physically active (sedentary lifestyle).  · Living where there are limited places to exercise safely or buy healthy foods.  · Not getting enough sleep.  What increases the risk?  The following factors may increase your risk of this condition:  · Having a family history of obesity.  · Being a woman of -American descent.  · Being a man of   descent.  What are the signs or symptoms?  Having excessive body fat is the main symptom of this condition.  How is this diagnosed?  This condition may be diagnosed based on:  · Your symptoms.  · Your medical history.  · A physical exam. Your health care provider may measure:  ¨ Your BMI. If you are an adult with a BMI between 25 and less than 30, you are considered overweight. If you are an adult with a BMI of 30 or higher, you are considered obese.  ¨ The distances around your hips and your waist (circumferences). These may be compared to each other to help diagnose your condition.  ¨ Your skinfold thickness. Your health care provider may gently pinch a fold of your skin and measure it.  How is this treated?  Treatment for this condition often includes changing your lifestyle. Treatment may include some or all of the following:  · Dietary changes. Work with your health care provider and a dietitian to set a weight-loss goal that is healthy and reasonable for you. Dietary changes may include eating:  ¨ Smaller portions. A portion size is the amount of a particular food that is healthy for you to eat at one time. This varies from person to person.  ¨ Low-calorie or low-fat options.  ¨ More whole grains, fruits, and vegetables.  · Regular physical activity. This may include aerobic activity (cardio) and strength training.  · Medicine to help you lose weight. Your health care provider may prescribe medicine if you are unable to lose 1 pound a week after 6 weeks of eating more healthily and doing more physical activity.  · Surgery. Surgical options may include gastric banding and gastric bypass. Surgery may be done if:  ¨ Other treatments have not helped to improve your condition.  ¨ You have a BMI of 40 or higher.  ¨ You have life-threatening health problems related to obesity.  Follow these instructions at home:     Eating and drinking     · Follow recommendations from your health care provider about what you eat and  drink. Your health care provider may advise you to:  ¨ Limit fast foods, sweets, and processed snack foods.  ¨ Choose low-fat options, such as low-fat milk instead of whole milk.  ¨ Eat 5 or more servings of fruits or vegetables every day.  ¨ Eat at home more often. This gives you more control over what you eat.  ¨ Choose healthy foods when you eat out.  ¨ Learn what a healthy portion size is.  ¨ Keep low-fat snacks on hand.  ¨ Avoid sugary drinks, such as soda, fruit juice, iced tea sweetened with sugar, and flavored milk.  ¨ Eat a healthy breakfast.  · Drink enough water to keep your urine clear or pale yellow.  · Do not go without eating for long periods of time (do not fast) or follow a fad diet. Fasting and fad diets can be unhealthy and even dangerous.  Physical Activity   · Exercise regularly, as told by your health care provider. Ask your health care provider what types of exercise are safe for you and how often you should exercise.  · Warm up and stretch before being active.  · Cool down and stretch after being active.  · Rest between periods of activity.  Lifestyle   · Limit the time that you spend in front of your TV, computer, or video game system.  · Find ways to reward yourself that do not involve food.  · Limit alcohol intake to no more than 1 drink a day for nonpregnant women and 2 drinks a day for men. One drink equals 12 oz of beer, 5 oz of wine, or 1½ oz of hard liquor.  General instructions   · Keep a weight loss journal to keep track of the food you eat and how much you exercise you get.  · Take over-the-counter and prescription medicines only as told by your health care provider.  · Take vitamins and supplements only as told by your health care provider.  · Consider joining a support group. Your health care provider may be able to recommend a support group.  · Keep all follow-up visits as told by your health care provider. This is important.  Contact a health care provider if:  · You are unable  to meet your weight loss goal after 6 weeks of dietary and lifestyle changes.  This information is not intended to replace advice given to you by your health care provider. Make sure you discuss any questions you have with your health care provider.  Document Released: 01/25/2006 Document Revised: 05/22/2017 Document Reviewed: 10/05/2016  [employer] Patient Education © 2017 [employer]

## 2018-11-15 ENCOUNTER — TELEPHONE (OUTPATIENT)
Dept: GASTROENTEROLOGY | Facility: CLINIC | Age: 73
End: 2018-11-15

## 2019-12-10 ENCOUNTER — TRANSCRIBE ORDERS (OUTPATIENT)
Dept: ADMINISTRATIVE | Facility: HOSPITAL | Age: 74
End: 2019-12-10

## 2019-12-10 ENCOUNTER — LAB (OUTPATIENT)
Dept: LAB | Facility: HOSPITAL | Age: 74
End: 2019-12-10

## 2019-12-10 DIAGNOSIS — C24.9 BILIARY TRACT CANCER (HCC): Primary | ICD-10-CM

## 2019-12-10 DIAGNOSIS — C24.9 BILIARY TRACT CANCER (HCC): ICD-10-CM

## 2019-12-10 LAB
ANION GAP SERPL CALCULATED.3IONS-SCNC: 7 MMOL/L (ref 4–13)
AUTO MIXED CELLS #: 0.4 10*3/MM3 (ref 0.1–2.6)
AUTO MIXED CELLS %: 7.8 % (ref 0.1–24)
BUN BLD-MCNC: 11 MG/DL (ref 5–21)
BUN/CREAT SERPL: 20.4
CALCIUM SPEC-SCNC: 8.8 MG/DL (ref 8.4–10.4)
CHLORIDE SERPL-SCNC: 99 MMOL/L (ref 98–110)
CO2 SERPL-SCNC: 30 MMOL/L (ref 24–31)
CREAT BLD-MCNC: 0.54 MG/DL (ref 0.5–1.4)
ERYTHROCYTE [DISTWIDTH] IN BLOOD BY AUTOMATED COUNT: 13.1 % (ref 12.3–15.4)
GFR SERPL CREATININE-BSD FRML MDRD: 111 ML/MIN/1.73
GLUCOSE BLD-MCNC: 117 MG/DL (ref 70–100)
HCT VFR BLD AUTO: 38.3 % (ref 34–46.6)
HGB BLD-MCNC: 13 G/DL (ref 12–15.9)
LYMPHOCYTES # BLD AUTO: 1.4 10*3/MM3 (ref 0.7–3.1)
LYMPHOCYTES NFR BLD AUTO: 24.8 % (ref 19.6–45.3)
MCH RBC QN AUTO: 30.8 PG (ref 26.6–33)
MCHC RBC AUTO-ENTMCNC: 33.9 G/DL (ref 31.5–35.7)
MCV RBC AUTO: 90.8 FL (ref 79–97)
NEUTROPHILS # BLD AUTO: 3.8 10*3/MM3 (ref 1.7–7)
NEUTROPHILS NFR BLD AUTO: 67.4 % (ref 42.7–76)
PLATELET # BLD AUTO: 206 10*3/MM3 (ref 140–450)
PMV BLD AUTO: 10.1 FL (ref 6–12)
POTASSIUM BLD-SCNC: 4.4 MMOL/L (ref 3.5–5.3)
RBC # BLD AUTO: 4.22 10*6/MM3 (ref 3.77–5.28)
SODIUM BLD-SCNC: 136 MMOL/L (ref 135–145)
WBC NRBC COR # BLD: 5.6 10*3/MM3 (ref 3.4–10.8)

## 2019-12-10 PROCEDURE — 36415 COLL VENOUS BLD VENIPUNCTURE: CPT

## 2019-12-10 PROCEDURE — 85025 COMPLETE CBC W/AUTO DIFF WBC: CPT | Performed by: SURGERY

## 2019-12-10 PROCEDURE — 80048 BASIC METABOLIC PNL TOTAL CA: CPT

## 2020-02-24 ENCOUNTER — TRANSCRIBE ORDERS (OUTPATIENT)
Dept: ADMINISTRATIVE | Facility: HOSPITAL | Age: 75
End: 2020-02-24

## 2020-02-24 ENCOUNTER — APPOINTMENT (OUTPATIENT)
Dept: LAB | Facility: HOSPITAL | Age: 75
End: 2020-02-24

## 2020-02-24 DIAGNOSIS — R94.5 ABNORMAL RESULTS OF LIVER FUNCTION STUDIES: Primary | ICD-10-CM

## 2020-02-24 DIAGNOSIS — M79.2 NEURALGIA: ICD-10-CM

## 2020-02-24 DIAGNOSIS — R53.81 OTHER MALAISE: ICD-10-CM

## 2020-02-24 LAB
ALBUMIN SERPL-MCNC: 4.3 G/DL (ref 3.5–5)
ALBUMIN/GLOB SERPL: 1.3 G/DL (ref 1.1–2.5)
ALP SERPL-CCNC: 92 U/L (ref 24–120)
ALT SERPL W P-5'-P-CCNC: 40 U/L (ref 0–54)
ANION GAP SERPL CALCULATED.3IONS-SCNC: 11 MMOL/L (ref 4–13)
AST SERPL-CCNC: 39 U/L (ref 7–45)
AUTO MIXED CELLS #: 0.4 10*3/MM3 (ref 0.1–2.6)
AUTO MIXED CELLS %: 8.4 % (ref 0.1–24)
BILIRUB SERPL-MCNC: 0.4 MG/DL (ref 0.1–1)
BUN BLD-MCNC: 14 MG/DL (ref 5–21)
BUN/CREAT SERPL: 28.6
CALCIUM SPEC-SCNC: 9.2 MG/DL (ref 8.4–10.4)
CHLORIDE SERPL-SCNC: 100 MMOL/L (ref 98–110)
CO2 SERPL-SCNC: 29 MMOL/L (ref 24–31)
CREAT BLD-MCNC: 0.49 MG/DL (ref 0.5–1.4)
ERYTHROCYTE [DISTWIDTH] IN BLOOD BY AUTOMATED COUNT: 13.5 % (ref 12.3–15.4)
GFR SERPL CREATININE-BSD FRML MDRD: 124 ML/MIN/1.73
GLOBULIN UR ELPH-MCNC: 3.3 GM/DL
GLUCOSE BLD-MCNC: 75 MG/DL (ref 70–100)
HCT VFR BLD AUTO: 37.2 % (ref 34–46.6)
HGB BLD-MCNC: 12.6 G/DL (ref 12–15.9)
LYMPHOCYTES # BLD AUTO: 1.1 10*3/MM3 (ref 0.7–3.1)
LYMPHOCYTES NFR BLD AUTO: 27.3 % (ref 19.6–45.3)
MCH RBC QN AUTO: 30.6 PG (ref 26.6–33)
MCHC RBC AUTO-ENTMCNC: 33.9 G/DL (ref 31.5–35.7)
MCV RBC AUTO: 90.3 FL (ref 79–97)
NEUTROPHILS # BLD AUTO: 2.7 10*3/MM3 (ref 1.7–7)
NEUTROPHILS NFR BLD AUTO: 64.3 % (ref 42.7–76)
PLATELET # BLD AUTO: 169 10*3/MM3 (ref 140–450)
PMV BLD AUTO: 10.7 FL (ref 6–12)
POTASSIUM BLD-SCNC: 4.3 MMOL/L (ref 3.5–5.3)
PROT SERPL-MCNC: 7.6 G/DL (ref 6.3–8.7)
RBC # BLD AUTO: 4.12 10*6/MM3 (ref 3.77–5.28)
SODIUM BLD-SCNC: 140 MMOL/L (ref 135–145)
WBC NRBC COR # BLD: 4.2 10*3/MM3 (ref 3.4–10.8)

## 2020-02-24 PROCEDURE — 84436 ASSAY OF TOTAL THYROXINE: CPT | Performed by: INTERNAL MEDICINE

## 2020-02-24 PROCEDURE — 84443 ASSAY THYROID STIM HORMONE: CPT | Performed by: INTERNAL MEDICINE

## 2020-02-24 PROCEDURE — 36415 COLL VENOUS BLD VENIPUNCTURE: CPT | Performed by: INTERNAL MEDICINE

## 2020-02-24 PROCEDURE — 82306 VITAMIN D 25 HYDROXY: CPT | Performed by: INTERNAL MEDICINE

## 2020-02-24 PROCEDURE — 80053 COMPREHEN METABOLIC PANEL: CPT | Performed by: INTERNAL MEDICINE

## 2020-02-24 PROCEDURE — 85025 COMPLETE CBC W/AUTO DIFF WBC: CPT | Performed by: INTERNAL MEDICINE

## 2020-02-25 LAB
25(OH)D3 SERPL-MCNC: 19.5 NG/ML (ref 30–100)
T4 SERPL-MCNC: 6.06 MCG/DL (ref 4.5–11.7)
TSH SERPL DL<=0.05 MIU/L-ACNC: 0.82 UIU/ML (ref 0.27–4.2)

## 2021-01-14 ENCOUNTER — IMMUNIZATION (OUTPATIENT)
Dept: VACCINE CLINIC | Facility: HOSPITAL | Age: 76
End: 2021-01-14

## 2021-01-14 PROCEDURE — 0001A: CPT | Performed by: THORACIC SURGERY (CARDIOTHORACIC VASCULAR SURGERY)

## 2021-01-14 PROCEDURE — 91300 HC SARSCOV02 VAC 30MCG/0.3ML IM: CPT | Performed by: THORACIC SURGERY (CARDIOTHORACIC VASCULAR SURGERY)

## 2021-02-04 ENCOUNTER — IMMUNIZATION (OUTPATIENT)
Dept: VACCINE CLINIC | Facility: HOSPITAL | Age: 76
End: 2021-02-04

## 2021-02-04 PROCEDURE — 91300 HC SARSCOV02 VAC 30MCG/0.3ML IM: CPT | Performed by: NURSE PRACTITIONER

## 2021-02-04 PROCEDURE — 0002A: CPT | Performed by: NURSE PRACTITIONER

## 2021-05-05 ENCOUNTER — LAB (OUTPATIENT)
Dept: LAB | Facility: HOSPITAL | Age: 76
End: 2021-05-05

## 2021-05-05 ENCOUNTER — TRANSCRIBE ORDERS (OUTPATIENT)
Dept: ADMINISTRATIVE | Facility: HOSPITAL | Age: 76
End: 2021-05-05

## 2021-05-05 DIAGNOSIS — R63.4 WEIGHT LOSS: Primary | ICD-10-CM

## 2021-05-05 DIAGNOSIS — R94.5 ABNORMAL RESULTS OF LIVER FUNCTION STUDIES: ICD-10-CM

## 2021-05-05 DIAGNOSIS — E55.9 VITAMIN D DEFICIENCY: ICD-10-CM

## 2021-05-05 LAB
ALBUMIN SERPL-MCNC: 4.2 G/DL (ref 3.5–5)
ALBUMIN/GLOB SERPL: 1.4 G/DL (ref 1.1–2.5)
ALP SERPL-CCNC: 139 U/L (ref 24–120)
ALT SERPL W P-5'-P-CCNC: 36 U/L (ref 0–35)
ANION GAP SERPL CALCULATED.3IONS-SCNC: 8 MMOL/L (ref 4–13)
AST SERPL-CCNC: 31 U/L (ref 7–45)
AUTO MIXED CELLS #: 0.4 10*3/MM3 (ref 0.1–2.6)
AUTO MIXED CELLS %: 6.1 % (ref 0.1–24)
BILIRUB SERPL-MCNC: 0.2 MG/DL (ref 0.1–1)
BUN SERPL-MCNC: 10 MG/DL (ref 5–21)
BUN/CREAT SERPL: 13.3
CALCIUM SPEC-SCNC: 9.2 MG/DL (ref 8.4–10.4)
CHLORIDE SERPL-SCNC: 99 MMOL/L (ref 98–110)
CO2 SERPL-SCNC: 29 MMOL/L (ref 24–31)
CREAT SERPL-MCNC: 0.75 MG/DL (ref 0.5–1.4)
ERYTHROCYTE [DISTWIDTH] IN BLOOD BY AUTOMATED COUNT: 13.2 % (ref 12.3–15.4)
GFR SERPL CREATININE-BSD FRML MDRD: 76 ML/MIN/1.73
GLOBULIN UR ELPH-MCNC: 3 GM/DL
GLUCOSE SERPL-MCNC: 87 MG/DL (ref 70–100)
HCT VFR BLD AUTO: 36.4 % (ref 34–46.6)
HGB BLD-MCNC: 12.2 G/DL (ref 12–15.9)
LYMPHOCYTES # BLD AUTO: 1.6 10*3/MM3 (ref 0.7–3.1)
LYMPHOCYTES NFR BLD AUTO: 26 % (ref 19.6–45.3)
MCH RBC QN AUTO: 30 PG (ref 26.6–33)
MCHC RBC AUTO-ENTMCNC: 33.5 G/DL (ref 31.5–35.7)
MCV RBC AUTO: 89.7 FL (ref 79–97)
NEUTROPHILS NFR BLD AUTO: 4.2 10*3/MM3 (ref 1.7–7)
NEUTROPHILS NFR BLD AUTO: 67.9 % (ref 42.7–76)
PLATELET # BLD AUTO: 229 10*3/MM3 (ref 140–450)
PMV BLD AUTO: 10.2 FL (ref 6–12)
POTASSIUM SERPL-SCNC: 4.1 MMOL/L (ref 3.5–5.3)
PROT SERPL-MCNC: 7.2 G/DL (ref 6.3–8.7)
RBC # BLD AUTO: 4.06 10*6/MM3 (ref 3.77–5.28)
SODIUM SERPL-SCNC: 136 MMOL/L (ref 135–145)
WBC # BLD AUTO: 6.2 10*3/MM3 (ref 3.4–10.8)

## 2021-05-05 PROCEDURE — 80053 COMPREHEN METABOLIC PANEL: CPT | Performed by: INTERNAL MEDICINE

## 2021-05-05 PROCEDURE — 84436 ASSAY OF TOTAL THYROXINE: CPT | Performed by: INTERNAL MEDICINE

## 2021-05-05 PROCEDURE — 84479 ASSAY OF THYROID (T3 OR T4): CPT | Performed by: INTERNAL MEDICINE

## 2021-05-05 PROCEDURE — 82607 VITAMIN B-12: CPT | Performed by: INTERNAL MEDICINE

## 2021-05-05 PROCEDURE — 36415 COLL VENOUS BLD VENIPUNCTURE: CPT | Performed by: INTERNAL MEDICINE

## 2021-05-05 PROCEDURE — 84443 ASSAY THYROID STIM HORMONE: CPT | Performed by: INTERNAL MEDICINE

## 2021-05-05 PROCEDURE — 82306 VITAMIN D 25 HYDROXY: CPT | Performed by: INTERNAL MEDICINE

## 2021-05-05 PROCEDURE — 85025 COMPLETE CBC W/AUTO DIFF WBC: CPT | Performed by: INTERNAL MEDICINE

## 2021-05-06 LAB
25(OH)D3 SERPL-MCNC: 17.5 NG/ML (ref 30–100)
T-UPTAKE NFR SERPL: 1.18 TBI (ref 0.8–1.3)
T4 SERPL-MCNC: 5.71 MCG/DL (ref 4.5–11.7)
TSH SERPL DL<=0.05 MIU/L-ACNC: 1.59 UIU/ML (ref 0.27–4.2)
VIT B12 BLD-MCNC: 185 PG/ML (ref 211–946)

## 2021-09-02 ENCOUNTER — OFFICE VISIT (OUTPATIENT)
Dept: GASTROENTEROLOGY | Facility: CLINIC | Age: 76
End: 2021-09-02

## 2021-09-02 VITALS
SYSTOLIC BLOOD PRESSURE: 128 MMHG | WEIGHT: 108 LBS | BODY MASS INDEX: 19.88 KG/M2 | HEART RATE: 70 BPM | OXYGEN SATURATION: 99 % | DIASTOLIC BLOOD PRESSURE: 72 MMHG | HEIGHT: 62 IN | TEMPERATURE: 98.1 F

## 2021-09-02 DIAGNOSIS — K58.0 IRRITABLE BOWEL SYNDROME WITH DIARRHEA: ICD-10-CM

## 2021-09-02 DIAGNOSIS — I10 HTN (HYPERTENSION), BENIGN: ICD-10-CM

## 2021-09-02 DIAGNOSIS — R19.7 DIARRHEA, UNSPECIFIED TYPE: ICD-10-CM

## 2021-09-02 DIAGNOSIS — Z86.010 HX OF COLONIC POLYPS: Primary | ICD-10-CM

## 2021-09-02 DIAGNOSIS — Z78.9 NONSMOKER: ICD-10-CM

## 2021-09-02 PROCEDURE — 99214 OFFICE O/P EST MOD 30 MIN: CPT | Performed by: CLINICAL NURSE SPECIALIST

## 2021-09-02 RX ORDER — SUMATRIPTAN 20 MG/1
1 SPRAY NASAL
COMMUNITY
End: 2021-12-07

## 2021-09-02 RX ORDER — ZOLPIDEM TARTRATE 10 MG/1
10 TABLET ORAL NIGHTLY PRN
Status: ON HOLD | COMMUNITY
End: 2021-09-28

## 2021-09-02 RX ORDER — SODIUM PICOSULFATE, MAGNESIUM OXIDE, AND ANHYDROUS CITRIC ACID 10; 3.5; 12 MG/160ML; G/160ML; G/160ML
1 LIQUID ORAL TAKE AS DIRECTED
Qty: 160 ML | Refills: 0 | Status: ON HOLD | OUTPATIENT
Start: 2021-09-02 | End: 2021-09-28

## 2021-09-02 RX ORDER — MONTELUKAST SODIUM 4 MG/1
1 TABLET, CHEWABLE ORAL DAILY
COMMUNITY
End: 2021-12-07

## 2021-09-02 RX ORDER — TIZANIDINE HYDROCHLORIDE 2 MG/1
2 CAPSULE, GELATIN COATED ORAL 3 TIMES DAILY
COMMUNITY
End: 2021-12-07

## 2021-09-02 RX ORDER — ESCITALOPRAM OXALATE 10 MG/1
10 TABLET ORAL DAILY
COMMUNITY
End: 2021-12-07

## 2021-09-02 RX ORDER — GABAPENTIN 600 MG/1
600 TABLET ORAL 3 TIMES DAILY
Status: ON HOLD | COMMUNITY
End: 2021-09-28

## 2021-09-02 RX ORDER — MELOXICAM 7.5 MG/1
7.5 TABLET ORAL DAILY
COMMUNITY
End: 2021-12-07

## 2021-09-02 RX ORDER — CYCLOBENZAPRINE HCL 5 MG
5 TABLET ORAL 3 TIMES DAILY PRN
COMMUNITY
End: 2021-12-07

## 2021-09-02 RX ORDER — FAMOTIDINE 20 MG/1
20 TABLET, FILM COATED ORAL 2 TIMES DAILY
COMMUNITY
End: 2021-12-07

## 2021-09-02 RX ORDER — PROMETHAZINE HYDROCHLORIDE 25 MG/1
25 TABLET ORAL EVERY 6 HOURS PRN
COMMUNITY

## 2021-09-02 RX ORDER — DIPHENOXYLATE HYDROCHLORIDE AND ATROPINE SULFATE 2.5; .025 MG/1; MG/1
1 TABLET ORAL 4 TIMES DAILY PRN
COMMUNITY

## 2021-09-02 NOTE — PROGRESS NOTES
Candice Kingsley  1945 9/2/2021  Chief Complaint   Patient presents with   • GI Problem     Diarrhea and weight loss     Subjective   HPI  Candice Kingsley is a 75 y.o. female who presents with a complaint of diarrhea that has been ongoing for her but worsened 3-4 times per day watery. She has had loose bowels for years she says.  She has been eating more vegetables and fresh fruits. She has had wt loss of approx 9 lbs in 2 weeks. She says that she is eating and has a good appetite. No rectal bleeding.  She has tried Welchol and that did not work for her. Colestid does not seem to be helping.     She has had her biliary tube removed in 2018 whipple was performed in 2011.   Her follow up CT was just performed at [facility] this year and was ok per the patient.   Her most recent labs 8/7/20 H/H 12.2/36.5 WBC 5.3.     Past Medical History:   Diagnosis Date   • Asthma    • Cancer (CMS/HCC)     Cholangiocarcinoma   • Hypertension    • Migraines      Past Surgical History:   Procedure Laterality Date   • CHOLECYSTECTOMY     • COLONOSCOPY  05/22/2013    Diverticulosis, Hemorrhoids repeat exam in 5 years   • COLONOSCOPY W/ POLYPECTOMY  07/26/2010    Hyperplastic polyp cecum, pan-diverticular disease repeat exam in 3 years   • COMMON BILE DUCT EXPLORATION      For carcinoma of the bile duct   • ENDOSCOPY  02/01/2016    Normal exam   • WHIPPLE PROCEDURE  2011       Outpatient Medications Marked as Taking for the 9/2/21 encounter (Office Visit) with Rosana Ding APRN   Medication Sig Dispense Refill   • butalbital-acetaminophen-caffeine (FIORICET, ESGIC) -40 MG per tablet Take 1 tablet by mouth 2 (Two) Times a Day.  0   • colestipol (COLESTID) 1 g tablet Take 1 g by mouth Daily.     • cyanocobalamin 1000 MCG/ML injection INJECT 1 ML ONCE A MONTH  2   • cyclobenzaprine (FLEXERIL) 5 MG tablet Take 5 mg by mouth 3 (Three) Times a Day As Needed for Muscle Spasms.     • diphenoxylate-atropine (LOMOTIL) 2.5-0.025  MG per tablet Take 1 tablet by mouth 4 (Four) Times a Day As Needed for Diarrhea.     • escitalopram (LEXAPRO) 10 MG tablet Take 10 mg by mouth Daily.     • famotidine (PEPCID) 20 MG tablet Take 20 mg by mouth 2 (Two) Times a Day.     • gabapentin (NEURONTIN) 600 MG tablet Take 600 mg by mouth 3 (Three) Times a Day.     • lisinopril (PRINIVIL,ZESTRIL) 10 MG tablet      • LORazepam (ATIVAN) 1 MG tablet      • meloxicam (MOBIC) 7.5 MG tablet Take 7.5 mg by mouth Daily.     • montelukast (SINGULAIR) 10 MG tablet      • promethazine (PHENERGAN) 25 MG tablet Take 25 mg by mouth Every 6 (Six) Hours As Needed for Nausea or Vomiting.     • SUMAtriptan (IMITREX) 20 MG/ACT nasal spray 1 spray into the nostril(s) as directed by provider Every 2 (Two) Hours As Needed for Migraine.     • TiZANidine (Zanaflex) 2 MG capsule Take 2 mg by mouth 3 (Three) Times a Day.     • zolpidem (Ambien) 10 MG tablet Take 10 mg by mouth At Night As Needed for Sleep.       Allergies   Allergen Reactions   • Cephalosporins Rash     Social History     Socioeconomic History   • Marital status:      Spouse name: Not on file   • Number of children: Not on file   • Years of education: Not on file   • Highest education level: Not on file   Tobacco Use   • Smoking status: Former Smoker   • Smokeless tobacco: Never Used   Substance and Sexual Activity   • Alcohol use: Yes     Comment: Occasional     Family History   Problem Relation Age of Onset   • Colon cancer Neg Hx    • Colon polyps Neg Hx      Health Maintenance   Topic Date Due   • TDAP/TD VACCINES (1 - Tdap) Never done   • ZOSTER VACCINE (1 of 2) Never done   • Pneumococcal Vaccine 65+ (1 of 1 - PPSV23) Never done   • HEPATITIS C SCREENING  Never done   • ANNUAL WELLNESS VISIT  02/08/2018   • MAMMOGRAM  09/11/2021   • DXA SCAN  09/11/2021   • INFLUENZA VACCINE  10/01/2021   • COLORECTAL CANCER SCREENING  05/22/2023   • COVID-19 Vaccine  Completed     Review of Systems   Constitutional:  "Positive for fatigue and unexpected weight change. Negative for activity change, appetite change, chills, diaphoresis and fever.   HENT: Negative for ear pain, hearing loss, mouth sores, sore throat, trouble swallowing and voice change.    Eyes: Negative.    Respiratory: Negative for cough, choking, shortness of breath and wheezing.    Cardiovascular: Negative for chest pain and palpitations.   Gastrointestinal: Positive for diarrhea. Negative for abdominal pain, blood in stool, constipation, nausea and vomiting.   Endocrine: Negative for cold intolerance and heat intolerance.   Genitourinary: Negative for decreased urine volume, dysuria, frequency, hematuria and urgency.   Musculoskeletal: Negative for back pain, gait problem and myalgias.   Skin: Negative for color change, pallor and rash.   Allergic/Immunologic: Negative for food allergies and immunocompromised state.   Neurological: Positive for weakness. Negative for dizziness, tremors, seizures, syncope, light-headedness, numbness and headaches.   Hematological: Negative for adenopathy. Does not bruise/bleed easily.   Psychiatric/Behavioral: Negative for agitation and confusion. The patient is not nervous/anxious.    All other systems reviewed and are negative.    Objective   Vitals:    09/02/21 0941   BP: 128/72   Pulse: 70   Temp: 98.1 °F (36.7 °C)   SpO2: 99%   Weight: 49 kg (108 lb)   Height: 157.5 cm (62\")     Body mass index is 19.75 kg/m².  Physical Exam  Constitutional:       Appearance: She is well-developed.   HENT:      Head: Normocephalic and atraumatic.   Eyes:      Pupils: Pupils are equal, round, and reactive to light.   Neck:      Trachea: No tracheal deviation.   Cardiovascular:      Rate and Rhythm: Normal rate and regular rhythm.      Heart sounds: Normal heart sounds. No murmur heard.   No friction rub. No gallop.    Pulmonary:      Effort: Pulmonary effort is normal. No respiratory distress.      Breath sounds: Normal breath sounds. No " wheezing or rales.   Chest:      Chest wall: No tenderness.   Abdominal:      General: Bowel sounds are normal. There is no distension.      Palpations: Abdomen is soft. Abdomen is not rigid.      Tenderness: There is no abdominal tenderness. There is no guarding or rebound.   Musculoskeletal:         General: No tenderness or deformity. Normal range of motion.      Cervical back: Normal range of motion and neck supple.   Skin:     General: Skin is warm and dry.      Coloration: Skin is not pale.      Findings: No rash.   Neurological:      Mental Status: She is alert and oriented to person, place, and time.      Deep Tendon Reflexes: Reflexes are normal and symmetric.   Psychiatric:         Behavior: Behavior normal.         Thought Content: Thought content normal.         Judgment: Judgment normal.       Assessment/Plan   Diagnoses and all orders for this visit:    1. Hx of colonic polyps (Primary)  -     Case Request; Standing  -     Follow Anesthesia Guidelines / Standing Orders; Future  -     Obtain Informed Consent; Future  -     Implement Anesthesia Orders Day of Procedure; Standing  -     Obtain Informed Consent; Standing  -     Verify bowel prep was successful; Standing  -     Case Request  -     Sod Picosulfate-Mag Ox-Cit Acd (Clenpiq) 10-3.5-12 MG-GM -GM/160ML solution; Take 1 kit by mouth Take As Directed.  Dispense: 160 mL; Refill: 0    2. Irritable bowel syndrome with diarrhea  -     Gastrointestinal Panel, PCR - Stool, Per Rectum  -     Clostridium Difficile Toxin - Stool, Per Rectum  -     Fecal Lactoferrin - Stool, Per Rectum; Future    3. Nonsmoker    4. HTN (hypertension), benign  Comments:  cont BP medication the day of procedure    5. Diarrhea, unspecified type   -     Gastrointestinal Panel, PCR - Stool, Per Rectum    Labs reviewed  I have ordered stool cultures to rule out infectious diarrhea however she does have a chronic underlying diarrhea but it has worsened somewhat.     COLONOSCOPY WITH  ANESTHESIA (N/A)  Part of this note may be an electronic transcription/translation of spoken language to printed text using the Dragon Dictation System.  Body mass index is 19.75 kg/m².  No follow-ups on file.    Patient's Body mass index is 19.75 kg/m². indicating that she is within normal range (BMI 18.5-24.9). No BMI management plan needed..      All risks, benefits, alternatives, and indications of colonoscopy and/or Endoscopy procedure have been discussed with the patient. Risks to include perforation of the colon requiring possible surgery or colostomy, risk of bleeding from biopsies or removal of colon tissue, possibility of missing a colon polyp or cancer, or adverse drug reaction.  Benefits to include the diagnosis and management of disease of the colon and rectum. Alternatives to include barium enema, radiographic evaluation, lab testing or no intervention. Pt verbalizes understanding and agrees.     Rosana Ding, APRN  9/2/2021  10:18 CDT          If you smoke or use tobacco, 4 minutes reading provided  Steps to Quit Smoking  Smoking tobacco can be harmful to your health and can affect almost every organ in your body. Smoking puts you, and those around you, at risk for developing many serious chronic diseases. Quitting smoking is difficult, but it is one of the best things that you can do for your health. It is never too late to quit.  What are the benefits of quitting smoking?  When you quit smoking, you lower your risk of developing serious diseases and conditions, such as:  · Lung cancer or lung disease, such as COPD.  · Heart disease.  · Stroke.  · Heart attack.  · Infertility.  · Osteoporosis and bone fractures.  Additionally, symptoms such as coughing, wheezing, and shortness of breath may get better when you quit. You may also find that you get sick less often because your body is stronger at fighting off colds and infections. If you are pregnant, quitting smoking can help to reduce your  chances of having a baby of low birth weight.  How do I get ready to quit?  When you decide to quit smoking, create a plan to make sure that you are successful. Before you quit:  · Pick a date to quit. Set a date within the next two weeks to give you time to prepare.  · Write down the reasons why you are quitting. Keep this list in places where you will see it often, such as on your bathroom mirror or in your car or wallet.  · Identify the people, places, things, and activities that make you want to smoke (triggers) and avoid them. Make sure to take these actions:  ¨ Throw away all cigarettes at home, at work, and in your car.  ¨ Throw away smoking accessories, such as ashtrays and lighters.  ¨ Clean your car and make sure to empty the ashtray.  ¨ Clean your home, including curtains and carpets.  · Tell your family, friends, and coworkers that you are quitting. Support from your loved ones can make quitting easier.  · Talk with your health care provider about your options for quitting smoking.  · Find out what treatment options are covered by your health insurance.  What strategies can I use to quit smoking?  Talk with your healthcare provider about different strategies to quit smoking. Some strategies include:  · Quitting smoking altogether instead of gradually lessening how much you smoke over a period of time. Research shows that quitting “cold turkey” is more successful than gradually quitting.  · Attending in-person counseling to help you build problem-solving skills. You are more likely to have success in quitting if you attend several counseling sessions. Even short sessions of 10 minutes can be effective.  · Finding resources and support systems that can help you to quit smoking and remain smoke-free after you quit. These resources are most helpful when you use them often. They can include:  ¨ Online chats with a counselor.  ¨ Telephone quitlines.  ¨ Printed self-help materials.  ¨ Support groups or group  counseling.  ¨ Text messaging programs.  ¨ Mobile phone applications.  · Taking medicines to help you quit smoking. (If you are pregnant or breastfeeding, talk with your health care provider first.) Some medicines contain nicotine and some do not. Both types of medicines help with cravings, but the medicines that include nicotine help to relieve withdrawal symptoms. Your health care provider may recommend:  ¨ Nicotine patches, gum, or lozenges.  ¨ Nicotine inhalers or sprays.  ¨ Non-nicotine medicine that is taken by mouth.  Talk with your health care provider about combining strategies, such as taking medicines while you are also receiving in-person counseling. Using these two strategies together makes you more likely to succeed in quitting than if you used either strategy on its own.  If you are pregnant or breastfeeding, talk with your health care provider about finding counseling or other support strategies to quit smoking. Do not take medicine to help you quit smoking unless told to do so by your health care provider.  What things can I do to make it easier to quit?  Quitting smoking might feel overwhelming at first, but there is a lot that you can do to make it easier. Take these important actions:  · Reach out to your family and friends and ask that they support and encourage you during this time. Call telephone quitlines, reach out to support groups, or work with a counselor for support.  · Ask people who smoke to avoid smoking around you.  · Avoid places that trigger you to smoke, such as bars, parties, or smoke-break areas at work.  · Spend time around people who do not smoke.  · Lessen stress in your life, because stress can be a smoking trigger for some people. To lessen stress, try:  ¨ Exercising regularly.  ¨ Deep-breathing exercises.  ¨ Yoga.  ¨ Meditating.  ¨ Performing a body scan. This involves closing your eyes, scanning your body from head to toe, and noticing which parts of your body are  particularly tense. Purposefully relax the muscles in those areas.  · Download or purchase mobile phone or tablet apps (applications) that can help you stick to your quit plan by providing reminders, tips, and encouragement. There are many free apps, such as QuitGuide from the CDC (Centers for Disease Control and Prevention). You can find other support for quitting smoking (smoking cessation) through smokefree.gov and other websites.  How will I feel when I quit smoking?  Within the first 24 hours of quitting smoking, you may start to feel some withdrawal symptoms. These symptoms are usually most noticeable 2-3 days after quitting, but they usually do not last beyond 2-3 weeks. Changes or symptoms that you might experience include:  · Mood swings.  · Restlessness, anxiety, or irritation.  · Difficulty concentrating.  · Dizziness.  · Strong cravings for sugary foods in addition to nicotine.  · Mild weight gain.  · Constipation.  · Nausea.  · Coughing or a sore throat.  · Changes in how your medicines work in your body.  · A depressed mood.  · Difficulty sleeping (insomnia).  After the first 2-3 weeks of quitting, you may start to notice more positive results, such as:  · Improved sense of smell and taste.  · Decreased coughing and sore throat.  · Slower heart rate.  · Lower blood pressure.  · Clearer skin.  · The ability to breathe more easily.  · Fewer sick days.  Quitting smoking is very challenging for most people. Do not get discouraged if you are not successful the first time. Some people need to make many attempts to quit before they achieve long-term success. Do your best to stick to your quit plan, and talk with your health care provider if you have any questions or concerns.  This information is not intended to replace advice given to you by your health care provider. Make sure you discuss any questions you have with your health care provider.  Document Released: 12/12/2002 Document Revised: 08/15/2017  Document Reviewed: 05/03/2016  [employer] Interactive Patient Education © 2017 [employer]

## 2021-09-13 ENCOUNTER — LAB (OUTPATIENT)
Dept: LAB | Facility: HOSPITAL | Age: 76
End: 2021-09-13

## 2021-09-13 DIAGNOSIS — K58.0 IRRITABLE BOWEL SYNDROME WITH DIARRHEA: ICD-10-CM

## 2021-09-13 LAB
ADV 40+41 DNA STL QL NAA+NON-PROBE: NOT DETECTED
ASTRO TYP 1-8 RNA STL QL NAA+NON-PROBE: NOT DETECTED
C CAYETANENSIS DNA STL QL NAA+NON-PROBE: NOT DETECTED
C COLI+JEJ+UPSA DNA STL QL NAA+NON-PROBE: NOT DETECTED
CRYPTOSP DNA STL QL NAA+NON-PROBE: NOT DETECTED
E HISTOLYT DNA STL QL NAA+NON-PROBE: NOT DETECTED
EAEC PAA PLAS AGGR+AATA ST NAA+NON-PRB: DETECTED
EC STX1+STX2 GENES STL QL NAA+NON-PROBE: NOT DETECTED
EPEC EAE GENE STL QL NAA+NON-PROBE: NOT DETECTED
ETEC LTA+ST1A+ST1B TOX ST NAA+NON-PROBE: NOT DETECTED
G LAMBLIA DNA STL QL NAA+NON-PROBE: NOT DETECTED
NOROVIRUS GI+II RNA STL QL NAA+NON-PROBE: NOT DETECTED
P SHIGELLOIDES DNA STL QL NAA+NON-PROBE: NOT DETECTED
RVA RNA STL QL NAA+NON-PROBE: NOT DETECTED
S ENT+BONG DNA STL QL NAA+NON-PROBE: NOT DETECTED
SAPO I+II+IV+V RNA STL QL NAA+NON-PROBE: NOT DETECTED
SHIGELLA SP+EIEC IPAH ST NAA+NON-PROBE: NOT DETECTED
V CHOL+PARA+VUL DNA STL QL NAA+NON-PROBE: NOT DETECTED
V CHOLERAE DNA STL QL NAA+NON-PROBE: NOT DETECTED
Y ENTEROCOL DNA STL QL NAA+NON-PROBE: NOT DETECTED

## 2021-09-13 PROCEDURE — 83630 LACTOFERRIN FECAL (QUAL): CPT

## 2021-09-13 PROCEDURE — 0097U HC BIOFIRE FILMARRAY GI PANEL: CPT | Performed by: CLINICAL NURSE SPECIALIST

## 2021-09-15 LAB — LACTOFERRIN STL QL LA: NEGATIVE

## 2021-09-28 ENCOUNTER — ANESTHESIA (OUTPATIENT)
Dept: GASTROENTEROLOGY | Facility: HOSPITAL | Age: 76
End: 2021-09-28

## 2021-09-28 ENCOUNTER — HOSPITAL ENCOUNTER (OUTPATIENT)
Facility: HOSPITAL | Age: 76
Setting detail: HOSPITAL OUTPATIENT SURGERY
Discharge: HOME | End: 2021-09-28
Attending: INTERNAL MEDICINE | Admitting: INTERNAL MEDICINE

## 2021-09-28 ENCOUNTER — ANESTHESIA EVENT (OUTPATIENT)
Dept: GASTROENTEROLOGY | Facility: HOSPITAL | Age: 76
End: 2021-09-28

## 2021-09-28 VITALS
SYSTOLIC BLOOD PRESSURE: 110 MMHG | DIASTOLIC BLOOD PRESSURE: 68 MMHG | TEMPERATURE: 97.1 F | RESPIRATION RATE: 16 BRPM | WEIGHT: 107 LBS | BODY MASS INDEX: 19.69 KG/M2 | HEIGHT: 62 IN | OXYGEN SATURATION: 97 % | HEART RATE: 67 BPM

## 2021-09-28 DIAGNOSIS — Z86.010 HX OF COLONIC POLYPS: ICD-10-CM

## 2021-09-28 PROCEDURE — 25010000002 PROPOFOL 10 MG/ML EMULSION

## 2021-09-28 PROCEDURE — G0105 COLORECTAL SCRN; HI RISK IND: HCPCS | Performed by: INTERNAL MEDICINE

## 2021-09-28 RX ORDER — SODIUM CHLORIDE 9 MG/ML
500 INJECTION, SOLUTION INTRAVENOUS CONTINUOUS PRN
Status: DISCONTINUED | OUTPATIENT
Start: 2021-09-28 | End: 2021-09-28 | Stop reason: HOSPADM

## 2021-09-28 RX ORDER — SODIUM CHLORIDE 0.9 % (FLUSH) 0.9 %
10 SYRINGE (ML) INJECTION AS NEEDED
Status: DISCONTINUED | OUTPATIENT
Start: 2021-09-28 | End: 2021-09-28 | Stop reason: HOSPADM

## 2021-09-28 RX ORDER — PROPOFOL 10 MG/ML
VIAL (ML) INTRAVENOUS AS NEEDED
Status: DISCONTINUED | OUTPATIENT
Start: 2021-09-28 | End: 2021-09-28 | Stop reason: SURG

## 2021-09-28 RX ORDER — LIDOCAINE HYDROCHLORIDE 20 MG/ML
INJECTION, SOLUTION EPIDURAL; INFILTRATION; INTRACAUDAL; PERINEURAL AS NEEDED
Status: DISCONTINUED | OUTPATIENT
Start: 2021-09-28 | End: 2021-09-28 | Stop reason: SURG

## 2021-09-28 RX ADMIN — PROPOFOL 300 MG: 10 INJECTION, EMULSION INTRAVENOUS at 10:50

## 2021-09-28 RX ADMIN — LIDOCAINE HYDROCHLORIDE 60 MG: 20 INJECTION, SOLUTION EPIDURAL; INFILTRATION; INTRACAUDAL; PERINEURAL at 10:50

## 2021-09-28 RX ADMIN — SODIUM CHLORIDE 500 ML: 9 INJECTION, SOLUTION INTRAVENOUS at 09:06

## 2021-09-28 NOTE — ANESTHESIA PREPROCEDURE EVALUATION
Anesthesia Evaluation     Patient summary reviewed   no history of anesthetic complications:  NPO Solid Status: > 8 hours  NPO Liquid Status: > 2 hours           Airway   Mallampati: I  TM distance: >3 FB  Neck ROM: full  No difficulty expected  Dental          Pulmonary    (+) asthma,  (-) sleep apnea, not a smoker  Cardiovascular   Exercise tolerance: excellent (>7 METS)    (+) hypertension,   (-) past MI, CAD, dysrhythmias, cardiac stents, hyperlipidemia      Neuro/Psych  (-) seizures, TIA, CVA  GI/Hepatic/Renal/Endo    (-) liver disease, no renal disease, diabetes    ROS Comment: Cholangiocarcinoma    Musculoskeletal     Abdominal    Substance History      OB/GYN          Other      history of cancer (cholangiocarcinoma) remission                    Anesthesia Plan    ASA 2     MAC     intravenous induction     Anesthetic plan, all risks, benefits, and alternatives have been provided, discussed and informed consent has been obtained with: patient.

## 2021-09-28 NOTE — ANESTHESIA POSTPROCEDURE EVALUATION
"Patient: Candice Kingsley    Procedure Summary     Date: 09/28/21 Room / Location: [facility] ENDOSCOPY 2 /  [facility] END[facility]COPY    Anesthesia Start: 1044 Anesthesia Stop: 1107    Procedure: COLONOSCOPY WITH ANESTHESIA (N/A ) Diagnosis:       Hx of colonic polyps      (Hx of colonic polyps [Z86.010])    Surgeons: Anthony Jensen MD Provider: Yulia Marcelo CRNA    Anesthesia Type: MAC ASA Status: 2          Anesthesia Type: MAC    Vitals  Vitals Value Taken Time   /68 09/28/21 1121   Temp     Pulse 63 09/28/21 1125   Resp 16 09/28/21 1120   SpO2 99 % 09/28/21 1125   Vitals shown include unvalidated device data.        Post Anesthesia Care and Evaluation    Patient location during evaluation: PACU  Patient participation: complete - patient participated  Level of consciousness: awake and alert  Pain management: adequate  Airway patency: patent  Anesthetic complications: No anesthetic complications    Cardiovascular status: acceptable  Respiratory status: acceptable  Hydration status: acceptable    Comments: Blood pressure 110/68, pulse 67, temperature 97.1 °F (36.2 °C), temperature source Temporal, resp. rate 16, height 157.5 cm (62\"), weight 48.5 kg (107 lb), SpO2 97 %  Pt discharged from PACU based on jacinda score >8      "

## 2021-09-30 ENCOUNTER — TELEPHONE (OUTPATIENT)
Dept: GASTROENTEROLOGY | Facility: CLINIC | Age: 76
End: 2021-09-30

## 2021-10-11 ENCOUNTER — LAB (OUTPATIENT)
Dept: LAB | Facility: HOSPITAL | Age: 76
End: 2021-10-11

## 2021-10-11 ENCOUNTER — TRANSCRIBE ORDERS (OUTPATIENT)
Dept: ADMINISTRATIVE | Facility: HOSPITAL | Age: 76
End: 2021-10-11

## 2021-10-11 DIAGNOSIS — R94.5 ABNORMAL RESULTS OF LIVER FUNCTION STUDIES: ICD-10-CM

## 2021-10-11 DIAGNOSIS — R94.5 ABNORMAL RESULTS OF LIVER FUNCTION STUDIES: Primary | ICD-10-CM

## 2021-10-11 PROCEDURE — 80076 HEPATIC FUNCTION PANEL: CPT

## 2021-10-11 PROCEDURE — 36415 COLL VENOUS BLD VENIPUNCTURE: CPT

## 2021-10-12 LAB
ALBUMIN SERPL-MCNC: 3.9 G/DL (ref 3.5–5.2)
ALP SERPL-CCNC: 359 U/L (ref 39–117)
ALT SERPL W P-5'-P-CCNC: 114 U/L (ref 1–33)
AST SERPL-CCNC: 80 U/L (ref 1–32)
BILIRUB CONJ SERPL-MCNC: 0.2 MG/DL (ref 0–0.3)
BILIRUB INDIRECT SERPL-MCNC: 0.2 MG/DL
BILIRUB SERPL-MCNC: 0.4 MG/DL (ref 0–1.2)
PROT SERPL-MCNC: 6.6 G/DL (ref 6–8.5)

## 2021-10-18 ENCOUNTER — LAB (OUTPATIENT)
Dept: LAB | Facility: HOSPITAL | Age: 76
End: 2021-10-18

## 2021-10-18 ENCOUNTER — TRANSCRIBE ORDERS (OUTPATIENT)
Dept: ADMINISTRATIVE | Facility: HOSPITAL | Age: 76
End: 2021-10-18

## 2021-10-18 DIAGNOSIS — Z85.9 HISTORY OF MALIGNANT NEOPLASM: ICD-10-CM

## 2021-10-18 DIAGNOSIS — R94.5 ABNORMAL RESULTS OF LIVER FUNCTION STUDIES: ICD-10-CM

## 2021-10-18 DIAGNOSIS — K83.09 CHOLANGITIS: Primary | ICD-10-CM

## 2021-10-18 LAB
ALBUMIN SERPL-MCNC: 4.2 G/DL (ref 3.5–5.2)
ALP SERPL-CCNC: 380 U/L (ref 39–117)
ALT SERPL W P-5'-P-CCNC: 61 U/L (ref 1–33)
AST SERPL-CCNC: 60 U/L (ref 1–32)
BILIRUB CONJ SERPL-MCNC: <0.2 MG/DL (ref 0–0.3)
BILIRUB INDIRECT SERPL-MCNC: ABNORMAL MG/DL
BILIRUB SERPL-MCNC: 0.3 MG/DL (ref 0–1.2)
PROT SERPL-MCNC: 6.6 G/DL (ref 6–8.5)

## 2021-10-18 PROCEDURE — 80076 HEPATIC FUNCTION PANEL: CPT | Performed by: INTERNAL MEDICINE

## 2021-10-18 PROCEDURE — 36415 COLL VENOUS BLD VENIPUNCTURE: CPT | Performed by: INTERNAL MEDICINE

## 2021-10-27 ENCOUNTER — LAB (OUTPATIENT)
Dept: LAB | Facility: HOSPITAL | Age: 76
End: 2021-10-27

## 2021-10-27 ENCOUNTER — TRANSCRIBE ORDERS (OUTPATIENT)
Dept: ADMINISTRATIVE | Facility: HOSPITAL | Age: 76
End: 2021-10-27

## 2021-10-27 DIAGNOSIS — R94.5 ABNORMAL RESULTS OF LIVER FUNCTION STUDIES: Primary | ICD-10-CM

## 2021-10-27 DIAGNOSIS — R94.5 ABNORMAL RESULTS OF LIVER FUNCTION STUDIES: ICD-10-CM

## 2021-10-27 PROCEDURE — 36415 COLL VENOUS BLD VENIPUNCTURE: CPT

## 2021-10-27 PROCEDURE — 80076 HEPATIC FUNCTION PANEL: CPT

## 2021-10-28 LAB
ALBUMIN SERPL-MCNC: 4.2 G/DL (ref 3.5–5.2)
ALP SERPL-CCNC: 336 U/L (ref 39–117)
ALT SERPL W P-5'-P-CCNC: 77 U/L (ref 1–33)
AST SERPL-CCNC: 121 U/L (ref 1–32)
BILIRUB CONJ SERPL-MCNC: <0.2 MG/DL (ref 0–0.3)
BILIRUB INDIRECT SERPL-MCNC: ABNORMAL MG/DL
BILIRUB SERPL-MCNC: 0.2 MG/DL (ref 0–1.2)
PROT SERPL-MCNC: 6.6 G/DL (ref 6–8.5)

## 2021-11-03 ENCOUNTER — TRANSCRIBE ORDERS (OUTPATIENT)
Dept: ADMINISTRATIVE | Facility: HOSPITAL | Age: 76
End: 2021-11-03

## 2021-11-03 ENCOUNTER — LAB (OUTPATIENT)
Dept: LAB | Facility: HOSPITAL | Age: 76
End: 2021-11-03

## 2021-11-03 DIAGNOSIS — R94.5 ABNORMAL RESULTS OF LIVER FUNCTION STUDIES: ICD-10-CM

## 2021-11-03 DIAGNOSIS — R94.5 ABNORMAL RESULTS OF LIVER FUNCTION STUDIES: Primary | ICD-10-CM

## 2021-11-03 PROCEDURE — 80076 HEPATIC FUNCTION PANEL: CPT

## 2021-11-03 PROCEDURE — 36415 COLL VENOUS BLD VENIPUNCTURE: CPT

## 2021-11-04 LAB
ALBUMIN SERPL-MCNC: 4.1 G/DL (ref 3.5–5.2)
ALP SERPL-CCNC: 606 U/L (ref 39–117)
ALT SERPL W P-5'-P-CCNC: 236 U/L (ref 1–33)
AST SERPL-CCNC: 362 U/L (ref 1–32)
BILIRUB CONJ SERPL-MCNC: 0.3 MG/DL (ref 0–0.3)
BILIRUB INDIRECT SERPL-MCNC: 0.2 MG/DL
BILIRUB SERPL-MCNC: 0.5 MG/DL (ref 0–1.2)
PROT SERPL-MCNC: 6.8 G/DL (ref 6–8.5)

## 2021-11-11 ENCOUNTER — TRANSCRIBE ORDERS (OUTPATIENT)
Dept: ADMINISTRATIVE | Facility: HOSPITAL | Age: 76
End: 2021-11-11

## 2021-11-11 ENCOUNTER — LAB (OUTPATIENT)
Dept: LAB | Facility: HOSPITAL | Age: 76
End: 2021-11-11

## 2021-11-11 DIAGNOSIS — R94.5 ABNORMAL RESULTS OF LIVER FUNCTION STUDIES: Primary | ICD-10-CM

## 2021-11-11 PROCEDURE — 36415 COLL VENOUS BLD VENIPUNCTURE: CPT | Performed by: INTERNAL MEDICINE

## 2021-11-11 PROCEDURE — 80076 HEPATIC FUNCTION PANEL: CPT | Performed by: INTERNAL MEDICINE

## 2021-11-12 LAB
ALBUMIN SERPL-MCNC: 3.8 G/DL (ref 3.5–5.2)
ALP SERPL-CCNC: 378 U/L (ref 39–117)
ALT SERPL W P-5'-P-CCNC: 55 U/L (ref 1–33)
AST SERPL-CCNC: 53 U/L (ref 1–32)
BILIRUB CONJ SERPL-MCNC: <0.2 MG/DL (ref 0–0.3)
BILIRUB INDIRECT SERPL-MCNC: ABNORMAL MG/DL
BILIRUB SERPL-MCNC: 0.2 MG/DL (ref 0–1.2)
PROT SERPL-MCNC: 6.3 G/DL (ref 6–8.5)

## 2021-11-19 ENCOUNTER — TRANSCRIBE ORDERS (OUTPATIENT)
Dept: ADMINISTRATIVE | Facility: HOSPITAL | Age: 76
End: 2021-11-19

## 2021-11-19 ENCOUNTER — HOSPITAL ENCOUNTER (OUTPATIENT)
Dept: CT IMAGING | Facility: HOSPITAL | Age: 76
Discharge: HOME | End: 2021-11-19
Admitting: INTERNAL MEDICINE

## 2021-11-19 DIAGNOSIS — R51.9 INTRACTABLE HEADACHE, UNSPECIFIED CHRONICITY PATTERN, UNSPECIFIED HEADACHE TYPE: Primary | ICD-10-CM

## 2021-11-19 DIAGNOSIS — R51.9 INTRACTABLE HEADACHE, UNSPECIFIED CHRONICITY PATTERN, UNSPECIFIED HEADACHE TYPE: ICD-10-CM

## 2021-11-19 PROCEDURE — 70450 CT HEAD/BRAIN W/O DYE: CPT

## 2021-11-22 ENCOUNTER — LAB (OUTPATIENT)
Dept: LAB | Facility: HOSPITAL | Age: 76
End: 2021-11-22

## 2021-11-22 ENCOUNTER — TRANSCRIBE ORDERS (OUTPATIENT)
Dept: ADMINISTRATIVE | Facility: HOSPITAL | Age: 76
End: 2021-11-22

## 2021-11-22 DIAGNOSIS — Z53.20: ICD-10-CM

## 2021-11-22 DIAGNOSIS — Z53.20: Primary | ICD-10-CM

## 2021-11-22 LAB
ALBUMIN SERPL-MCNC: 3.9 G/DL (ref 3.5–5.2)
ALP SERPL-CCNC: 309 U/L (ref 39–117)
ALT SERPL W P-5'-P-CCNC: 106 U/L (ref 1–33)
AST SERPL-CCNC: 102 U/L (ref 1–32)
AUTO MIXED CELLS #: 0.5 10*3/MM3 (ref 0.1–2.6)
AUTO MIXED CELLS %: 9.8 % (ref 0.1–24)
BILIRUB CONJ SERPL-MCNC: 0.3 MG/DL (ref 0–0.3)
BILIRUB INDIRECT SERPL-MCNC: 0.3 MG/DL
BILIRUB SERPL-MCNC: 0.6 MG/DL (ref 0–1.2)
ERYTHROCYTE [DISTWIDTH] IN BLOOD BY AUTOMATED COUNT: 13.4 % (ref 12.3–15.4)
HCT VFR BLD AUTO: 36.5 % (ref 34–46.6)
HGB BLD-MCNC: 11.8 G/DL (ref 12–15.9)
LYMPHOCYTES # BLD AUTO: 1 10*3/MM3 (ref 0.7–3.1)
LYMPHOCYTES NFR BLD AUTO: 20.1 % (ref 19.6–45.3)
MCH RBC QN AUTO: 30.2 PG (ref 26.6–33)
MCHC RBC AUTO-ENTMCNC: 32.3 G/DL (ref 31.5–35.7)
MCV RBC AUTO: 93.4 FL (ref 79–97)
NEUTROPHILS NFR BLD AUTO: 3.4 10*3/MM3 (ref 1.7–7)
NEUTROPHILS NFR BLD AUTO: 70.1 % (ref 42.7–76)
PLATELET # BLD AUTO: 238 10*3/MM3 (ref 140–450)
PMV BLD AUTO: 10.3 FL (ref 6–12)
PROT SERPL-MCNC: 6.8 G/DL (ref 6–8.5)
RBC # BLD AUTO: 3.91 10*6/MM3 (ref 3.77–5.28)
WBC NRBC COR # BLD: 4.9 10*3/MM3 (ref 3.4–10.8)

## 2021-11-22 PROCEDURE — 85025 COMPLETE CBC W/AUTO DIFF WBC: CPT

## 2021-11-22 PROCEDURE — 80076 HEPATIC FUNCTION PANEL: CPT

## 2021-11-22 PROCEDURE — 36415 COLL VENOUS BLD VENIPUNCTURE: CPT

## 2021-11-23 ENCOUNTER — LAB (OUTPATIENT)
Dept: LAB | Facility: HOSPITAL | Age: 76
End: 2021-11-23

## 2021-11-23 DIAGNOSIS — Z53.20: ICD-10-CM

## 2021-11-23 LAB — C DIFF TOX GENS STL QL NAA+PROBE: NEGATIVE

## 2021-11-23 PROCEDURE — 87493 C DIFF AMPLIFIED PROBE: CPT

## 2021-11-23 PROCEDURE — 87427 SHIGA-LIKE TOXIN AG IA: CPT

## 2021-11-23 PROCEDURE — 87177 OVA AND PARASITES SMEARS: CPT

## 2021-11-23 PROCEDURE — 87046 STOOL CULTR AEROBIC BACT EA: CPT

## 2021-11-23 PROCEDURE — 87045 FECES CULTURE AEROBIC BACT: CPT

## 2021-11-23 PROCEDURE — 87209 SMEAR COMPLEX STAIN: CPT

## 2021-11-24 LAB
O+P SPEC MICRO: NORMAL
O+P STL CONC: NORMAL

## 2021-11-27 LAB
BACTERIA SPEC CULT: NORMAL
BACTERIA SPEC CULT: NORMAL
CAMPYLOBACTER STL CULT: NORMAL
E COLI SXT STL QL IA: NEGATIVE
SALM + SHIG STL CULT: NORMAL

## 2021-11-29 ENCOUNTER — TRANSCRIBE ORDERS (OUTPATIENT)
Dept: LAB | Facility: HOSPITAL | Age: 76
End: 2021-11-29

## 2021-11-29 ENCOUNTER — LAB (OUTPATIENT)
Dept: LAB | Facility: HOSPITAL | Age: 76
End: 2021-11-29

## 2021-11-29 DIAGNOSIS — R94.5 ABNORMAL RESULTS OF LIVER FUNCTION STUDIES: ICD-10-CM

## 2021-11-29 DIAGNOSIS — R94.5 ABNORMAL RESULTS OF LIVER FUNCTION STUDIES: Primary | ICD-10-CM

## 2021-11-29 PROCEDURE — 36415 COLL VENOUS BLD VENIPUNCTURE: CPT

## 2021-11-29 PROCEDURE — 80076 HEPATIC FUNCTION PANEL: CPT

## 2021-11-30 LAB
ALBUMIN SERPL-MCNC: 4.1 G/DL (ref 3.5–5.2)
ALP SERPL-CCNC: 476 U/L (ref 39–117)
ALT SERPL W P-5'-P-CCNC: 102 U/L (ref 1–33)
AST SERPL-CCNC: 156 U/L (ref 1–32)
BILIRUB CONJ SERPL-MCNC: 0.2 MG/DL (ref 0–0.3)
BILIRUB INDIRECT SERPL-MCNC: 0.1 MG/DL
BILIRUB SERPL-MCNC: 0.3 MG/DL (ref 0–1.2)
PROT SERPL-MCNC: 7.1 G/DL (ref 6–8.5)

## 2021-12-02 ENCOUNTER — TRANSCRIBE ORDERS (OUTPATIENT)
Dept: ADMINISTRATIVE | Facility: HOSPITAL | Age: 76
End: 2021-12-02

## 2021-12-02 ENCOUNTER — LAB (OUTPATIENT)
Dept: LAB | Facility: HOSPITAL | Age: 76
End: 2021-12-02

## 2021-12-02 DIAGNOSIS — R94.5 ABNORMAL RESULTS OF LIVER FUNCTION STUDIES: Primary | ICD-10-CM

## 2021-12-02 PROCEDURE — 80076 HEPATIC FUNCTION PANEL: CPT | Performed by: INTERNAL MEDICINE

## 2021-12-02 PROCEDURE — 36415 COLL VENOUS BLD VENIPUNCTURE: CPT | Performed by: INTERNAL MEDICINE

## 2021-12-03 LAB
ALBUMIN SERPL-MCNC: 4.1 G/DL (ref 3.5–5.2)
ALP SERPL-CCNC: 499 U/L (ref 39–117)
ALT SERPL W P-5'-P-CCNC: 92 U/L (ref 1–33)
AST SERPL-CCNC: 124 U/L (ref 1–32)
BILIRUB CONJ SERPL-MCNC: 0.2 MG/DL (ref 0–0.3)
BILIRUB INDIRECT SERPL-MCNC: 0.1 MG/DL
BILIRUB SERPL-MCNC: 0.3 MG/DL (ref 0–1.2)
PROT SERPL-MCNC: 7.1 G/DL (ref 6–8.5)

## 2021-12-07 ENCOUNTER — LAB (OUTPATIENT)
Dept: LAB | Facility: HOSPITAL | Age: 76
End: 2021-12-07

## 2021-12-07 ENCOUNTER — OFFICE VISIT (OUTPATIENT)
Dept: GASTROENTEROLOGY | Facility: CLINIC | Age: 76
End: 2021-12-07

## 2021-12-07 VITALS
TEMPERATURE: 97.7 F | DIASTOLIC BLOOD PRESSURE: 72 MMHG | SYSTOLIC BLOOD PRESSURE: 122 MMHG | BODY MASS INDEX: 19.88 KG/M2 | HEIGHT: 62 IN | OXYGEN SATURATION: 98 % | WEIGHT: 108 LBS | HEART RATE: 77 BPM

## 2021-12-07 DIAGNOSIS — R79.89 ELEVATED LIVER FUNCTION TESTS: ICD-10-CM

## 2021-12-07 DIAGNOSIS — R79.89 ELEVATED LIVER FUNCTION TESTS: Primary | ICD-10-CM

## 2021-12-07 DIAGNOSIS — K75.9 INFLAMMATORY LIVER DISEASE, UNSPECIFIED: ICD-10-CM

## 2021-12-07 DIAGNOSIS — Z85.09 HISTORY OF CANCER OF GALLBLADDER: ICD-10-CM

## 2021-12-07 LAB
CERULOPLASMIN SERPL-MCNC: 32 MG/DL (ref 19–39)
FERRITIN SERPL-MCNC: 28.7 NG/ML (ref 13–150)
GGT SERPL-CCNC: 741 U/L (ref 5–36)
HAV IGM SERPL QL IA: NORMAL
HBV CORE IGM SERPL QL IA: NORMAL
HBV SURFACE AG SERPL QL IA: NORMAL
HCV AB SER DONR QL: NORMAL
IRON 24H UR-MRATE: 95 MCG/DL (ref 37–145)
IRON SATN MFR SERPL: 22 % (ref 20–50)
TIBC SERPL-MCNC: 440 MCG/DL (ref 298–536)
TRANSFERRIN SERPL-MCNC: 295 MG/DL (ref 200–360)

## 2021-12-07 PROCEDURE — 82390 ASSAY OF CERULOPLASMIN: CPT

## 2021-12-07 PROCEDURE — 83540 ASSAY OF IRON: CPT

## 2021-12-07 PROCEDURE — 82103 ALPHA-1-ANTITRYPSIN TOTAL: CPT | Performed by: CLINICAL NURSE SPECIALIST

## 2021-12-07 PROCEDURE — 84075 ASSAY ALKALINE PHOSPHATASE: CPT

## 2021-12-07 PROCEDURE — 83516 IMMUNOASSAY NONANTIBODY: CPT

## 2021-12-07 PROCEDURE — 84080 ASSAY ALKALINE PHOSPHATASES: CPT

## 2021-12-07 PROCEDURE — 99214 OFFICE O/P EST MOD 30 MIN: CPT | Performed by: CLINICAL NURSE SPECIALIST

## 2021-12-07 PROCEDURE — 84466 ASSAY OF TRANSFERRIN: CPT

## 2021-12-07 PROCEDURE — 82728 ASSAY OF FERRITIN: CPT

## 2021-12-07 PROCEDURE — 82977 ASSAY OF GGT: CPT

## 2021-12-07 PROCEDURE — 86038 ANTINUCLEAR ANTIBODIES: CPT | Performed by: CLINICAL NURSE SPECIALIST

## 2021-12-07 PROCEDURE — 82104 ALPHA-1-ANTITRYPSIN PHENO: CPT | Performed by: CLINICAL NURSE SPECIALIST

## 2021-12-07 PROCEDURE — 36415 COLL VENOUS BLD VENIPUNCTURE: CPT

## 2021-12-07 PROCEDURE — 80074 ACUTE HEPATITIS PANEL: CPT

## 2021-12-07 NOTE — PROGRESS NOTES
Candice Kingsley  1945 12/7/2021  Chief Complaint   Patient presents with   • GI Problem     Elevated liver functions     Subjective   HPI  Candice Kingsley is a 76 y.o. female who presents with a complaint of elevated liver function tests that has been persistent for 2 months with Dr christian Olivier checking frequently with persistent elevation. ALT 12/2/21 92, , ALKP 499, T bili 0.3.  Platelets are 238K. She has a hx of cholangiocarcinoma. She did have elevated 2/24/20 liver functions were normal at that time. First elevated was in May ALKP 139.   She has had weight loss of approx 10 pound in a month. She has no pain. She has had itching of her hands and feet which led her to her primary care physician.      Last colonoscopy 9/28/21- Diverticulosis in the sigmoid colon.  - No specimens collected.   Past Medical History:   Diagnosis Date   • Asthma    • Cancer (HCC)     Cholangiocarcinoma   • Hypertension    • Migraines      Past Surgical History:   Procedure Laterality Date   • CHOLECYSTECTOMY     • COLONOSCOPY  05/22/2013    Diverticulosis, Hemorrhoids repeat exam in 5 years   • COLONOSCOPY N/A 9/28/2021    Tics otherwise normal exam repeat in 7 years   • COLONOSCOPY W/ POLYPECTOMY  07/26/2010    Hyperplastic polyp cecum, pan-diverticular disease repeat exam in 3 years   • COMMON BILE DUCT EXPLORATION      For carcinoma of the bile duct   • ENDOSCOPY  02/01/2016    Normal exam   • WHIPPLE PROCEDURE  2011       Outpatient Medications Marked as Taking for the 12/7/21 encounter (Office Visit) with Rosana Ding APRN   Medication Sig Dispense Refill   • butalbital-acetaminophen-caffeine (FIORICET, ESGIC) -40 MG per tablet Take 1 tablet by mouth 2 (Two) Times a Day.  0   • cyanocobalamin 1000 MCG/ML injection INJECT 1 ML ONCE A MONTH  2   • diphenoxylate-atropine (LOMOTIL) 2.5-0.025 MG per tablet Take 1 tablet by mouth 4 (Four) Times a Day As Needed for Diarrhea.     • lisinopril (PRINIVIL,ZESTRIL)  10 MG tablet      • LORazepam (ATIVAN) 1 MG tablet      • promethazine (PHENERGAN) 25 MG tablet Take 25 mg by mouth Every 6 (Six) Hours As Needed for Nausea or Vomiting.       Allergies   Allergen Reactions   • Cephalosporins Rash     Social History     Socioeconomic History   • Marital status:    Tobacco Use   • Smoking status: Former Smoker   • Smokeless tobacco: Never Used   Substance and Sexual Activity   • Alcohol use: Yes     Comment: Occasional   • Sexual activity: Defer     Family History   Problem Relation Age of Onset   • Colon cancer Neg Hx    • Colon polyps Neg Hx      Health Maintenance   Topic Date Due   • TDAP/TD VACCINES (1 - Tdap) Never done   • ZOSTER VACCINE (1 of 2) Never done   • Pneumococcal Vaccine 65+ (1 of 1 - PPSV23) Never done   • HEPATITIS C SCREENING  Never done   • ANNUAL WELLNESS VISIT  02/08/2018   • INFLUENZA VACCINE  08/01/2021   • MAMMOGRAM  09/11/2021   • DXA SCAN  09/11/2021   • COLORECTAL CANCER SCREENING  09/28/2028   • COVID-19 Vaccine  Completed     Review of Systems   Constitutional: Negative for activity change, appetite change, chills, diaphoresis, fatigue, fever and unexpected weight change.   HENT: Negative for ear pain, hearing loss, mouth sores, sore throat, trouble swallowing and voice change.    Eyes: Negative.    Respiratory: Negative for cough, choking, shortness of breath and wheezing.    Cardiovascular: Negative for chest pain and palpitations.   Gastrointestinal: Negative for abdominal pain, blood in stool, constipation, diarrhea, nausea and vomiting.   Endocrine: Negative for cold intolerance and heat intolerance.   Genitourinary: Negative for decreased urine volume, dysuria, frequency, hematuria and urgency.   Musculoskeletal: Negative for back pain, gait problem and myalgias.   Skin: Negative for color change, pallor and rash.   Allergic/Immunologic: Negative for food allergies and immunocompromised state.   Neurological: Negative for dizziness,  "tremors, seizures, syncope, weakness, light-headedness, numbness and headaches.   Hematological: Negative for adenopathy. Does not bruise/bleed easily.   Psychiatric/Behavioral: Negative for agitation and confusion. The patient is not nervous/anxious.    All other systems reviewed and are negative.    Objective   Vitals:    12/07/21 1035   BP: 122/72   Pulse: 77   Temp: 97.7 °F (36.5 °C)   SpO2: 98%   Weight: 49 kg (108 lb)   Height: 157.5 cm (62\")     Body mass index is 19.75 kg/m².  Physical Exam  Constitutional:       Appearance: She is well-developed.   HENT:      Head: Normocephalic and atraumatic.   Eyes:      Pupils: Pupils are equal, round, and reactive to light.   Neck:      Trachea: No tracheal deviation.   Cardiovascular:      Rate and Rhythm: Normal rate and regular rhythm.      Heart sounds: Normal heart sounds. No murmur heard.  No friction rub. No gallop.    Pulmonary:      Effort: Pulmonary effort is normal. No respiratory distress.      Breath sounds: Normal breath sounds. No wheezing or rales.   Chest:      Chest wall: No tenderness.   Abdominal:      General: Bowel sounds are normal. There is no distension.      Palpations: Abdomen is soft. Abdomen is not rigid.      Tenderness: There is no abdominal tenderness. There is no guarding or rebound.   Musculoskeletal:         General: No tenderness or deformity. Normal range of motion.      Cervical back: Normal range of motion and neck supple.   Skin:     General: Skin is warm and dry.      Coloration: Skin is not pale.      Findings: No rash.   Neurological:      Mental Status: She is alert and oriented to person, place, and time.      Deep Tendon Reflexes: Reflexes are normal and symmetric.   Psychiatric:         Behavior: Behavior normal.         Thought Content: Thought content normal.         Judgment: Judgment normal.       Assessment/Plan   Diagnoses and all orders for this visit:    1. Elevated liver function tests (Primary)  -     Anti-smooth " muscle antibody titer; Future  -     Ceruloplasmin; Future  -     Ferritin; Future  -     Gamma GT; Future  -     Hepatitis panel, acute; Future  -     Iron and TIBC; Future  -     Mitochondrial antibodies, M2; Future  -     Cancel: Ultrasound liver; Future  -     Alpha - 1 - Antitrypsin Phenotype  -     ROBERTO  -     MRI abdomen w wo contrast mrcp; Future  -     Alkaline Phosphatase, Isoenzymes; Future    2. Inflammatory liver disease, unspecified   -     Gamma GT; Future  -     Hepatitis panel, acute; Future    3. History of cancer of gallbladder        Part of this note may be an electronic transcription/translation of spoken language to printed text using the Dragon Dictation System.  Body mass index is 19.75 kg/m².  Return in about 2 weeks (around 12/21/2021).    Patient's Body mass index is 19.75 kg/m². indicating that she is within normal range (BMI 18.5-24.9). No BMI management plan needed..      All risks, benefits, alternatives, and indications of colonoscopy and/or Endoscopy procedure have been discussed with the patient. Risks to include perforation of the colon requiring possible surgery or colostomy, risk of bleeding from biopsies or removal of colon tissue, possibility of missing a colon polyp or cancer, or adverse drug reaction.  Benefits to include the diagnosis and management of disease of the colon and rectum. Alternatives to include barium enema, radiographic evaluation, lab testing or no intervention. Pt verbalizes understanding and agrees.     Rosana Ding, APRN  12/7/2021  11:06 CST          If you smoke or use tobacco, 4 minutes reading provided  Steps to Quit Smoking  Smoking tobacco can be harmful to your health and can affect almost every organ in your body. Smoking puts you, and those around you, at risk for developing many serious chronic diseases. Quitting smoking is difficult, but it is one of the best things that you can do for your health. It is never too late to quit.  What are  the benefits of quitting smoking?  When you quit smoking, you lower your risk of developing serious diseases and conditions, such as:  · Lung cancer or lung disease, such as COPD.  · Heart disease.  · Stroke.  · Heart attack.  · Infertility.  · Osteoporosis and bone fractures.  Additionally, symptoms such as coughing, wheezing, and shortness of breath may get better when you quit. You may also find that you get sick less often because your body is stronger at fighting off colds and infections. If you are pregnant, quitting smoking can help to reduce your chances of having a baby of low birth weight.  How do I get ready to quit?  When you decide to quit smoking, create a plan to make sure that you are successful. Before you quit:  · Pick a date to quit. Set a date within the next two weeks to give you time to prepare.  · Write down the reasons why you are quitting. Keep this list in places where you will see it often, such as on your bathroom mirror or in your car or wallet.  · Identify the people, places, things, and activities that make you want to smoke (triggers) and avoid them. Make sure to take these actions:  ¨ Throw away all cigarettes at home, at work, and in your car.  ¨ Throw away smoking accessories, such as ashtrays and lighters.  ¨ Clean your car and make sure to empty the ashtray.  ¨ Clean your home, including curtains and carpets.  · Tell your family, friends, and coworkers that you are quitting. Support from your loved ones can make quitting easier.  · Talk with your health care provider about your options for quitting smoking.  · Find out what treatment options are covered by your health insurance.  What strategies can I use to quit smoking?  Talk with your healthcare provider about different strategies to quit smoking. Some strategies include:  · Quitting smoking altogether instead of gradually lessening how much you smoke over a period of time. Research shows that quitting “cold turkey” is more  successful than gradually quitting.  · Attending in-person counseling to help you build problem-solving skills. You are more likely to have success in quitting if you attend several counseling sessions. Even short sessions of 10 minutes can be effective.  · Finding resources and support systems that can help you to quit smoking and remain smoke-free after you quit. These resources are most helpful when you use them often. They can include:  ¨ Online chats with a counselor.  ¨ Telephone quitlines.  ¨ Printed self-help materials.  ¨ Support groups or group counseling.  ¨ Text messaging programs.  ¨ Mobile phone applications.  · Taking medicines to help you quit smoking. (If you are pregnant or breastfeeding, talk with your health care provider first.) Some medicines contain nicotine and some do not. Both types of medicines help with cravings, but the medicines that include nicotine help to relieve withdrawal symptoms. Your health care provider may recommend:  ¨ Nicotine patches, gum, or lozenges.  ¨ Nicotine inhalers or sprays.  ¨ Non-nicotine medicine that is taken by mouth.  Talk with your health care provider about combining strategies, such as taking medicines while you are also receiving in-person counseling. Using these two strategies together makes you more likely to succeed in quitting than if you used either strategy on its own.  If you are pregnant or breastfeeding, talk with your health care provider about finding counseling or other support strategies to quit smoking. Do not take medicine to help you quit smoking unless told to do so by your health care provider.  What things can I do to make it easier to quit?  Quitting smoking might feel overwhelming at first, but there is a lot that you can do to make it easier. Take these important actions:  · Reach out to your family and friends and ask that they support and encourage you during this time. Call telephone quitlines, reach out to support groups, or work  with a counselor for support.  · Ask people who smoke to avoid smoking around you.  · Avoid places that trigger you to smoke, such as bars, parties, or smoke-break areas at work.  · Spend time around people who do not smoke.  · Lessen stress in your life, because stress can be a smoking trigger for some people. To lessen stress, try:  ¨ Exercising regularly.  ¨ Deep-breathing exercises.  ¨ Yoga.  ¨ Meditating.  ¨ Performing a body scan. This involves closing your eyes, scanning your body from head to toe, and noticing which parts of your body are particularly tense. Purposefully relax the muscles in those areas.  · Download or purchase mobile phone or tablet apps (applications) that can help you stick to your quit plan by providing reminders, tips, and encouragement. There are many free apps, such as QuitGuide from the CDC (Centers for Disease Control and Prevention). You can find other support for quitting smoking (smoking cessation) through smokefree.gov and other websites.  How will I feel when I quit smoking?  Within the first 24 hours of quitting smoking, you may start to feel some withdrawal symptoms. These symptoms are usually most noticeable 2-3 days after quitting, but they usually do not last beyond 2-3 weeks. Changes or symptoms that you might experience include:  · Mood swings.  · Restlessness, anxiety, or irritation.  · Difficulty concentrating.  · Dizziness.  · Strong cravings for sugary foods in addition to nicotine.  · Mild weight gain.  · Constipation.  · Nausea.  · Coughing or a sore throat.  · Changes in how your medicines work in your body.  · A depressed mood.  · Difficulty sleeping (insomnia).  After the first 2-3 weeks of quitting, you may start to notice more positive results, such as:  · Improved sense of smell and taste.  · Decreased coughing and sore throat.  · Slower heart rate.  · Lower blood pressure.  · Clearer skin.  · The ability to breathe more easily.  · Fewer sick days.  Quitting  smoking is very challenging for most people. Do not get discouraged if you are not successful the first time. Some people need to make many attempts to quit before they achieve long-term success. Do your best to stick to your quit plan, and talk with your health care provider if you have any questions or concerns.  This information is not intended to replace advice given to you by your health care provider. Make sure you discuss any questions you have with your health care provider.  Document Released: 12/12/2002 Document Revised: 08/15/2017 Document Reviewed: 05/03/2016  [employer] Interactive Patient Education © 2017 [employer]

## 2021-12-08 LAB
ACTIN IGG SERPL-ACNC: 5 UNITS (ref 0–19)
ALP BONE CFR SERPL: 21 % (ref 14–68)
ALP INTEST CFR SERPL: 1 % (ref 0–18)
ALP LIVER CFR SERPL: 78 % (ref 18–85)
ALP SERPL-CCNC: 495 IU/L (ref 44–121)
ANA SER QL: NEGATIVE
MITOCHONDRIA M2 IGG SER-ACNC: <20 UNITS (ref 0–20)

## 2021-12-09 ENCOUNTER — HOSPITAL ENCOUNTER (OUTPATIENT)
Dept: MRI IMAGING | Facility: HOSPITAL | Age: 76
Discharge: HOME | End: 2021-12-09
Admitting: CLINICAL NURSE SPECIALIST

## 2021-12-09 DIAGNOSIS — R79.89 ELEVATED LIVER FUNCTION TESTS: ICD-10-CM

## 2021-12-09 LAB
A1AT PHENOTYP SERPL IFE: NORMAL
A1AT SERPL-MCNC: 178 MG/DL (ref 101–187)
CREAT BLDA-MCNC: 0.5 MG/DL (ref 0.6–1.3)

## 2021-12-09 PROCEDURE — 82565 ASSAY OF CREATININE: CPT

## 2021-12-09 PROCEDURE — A9577 INJ MULTIHANCE: HCPCS | Performed by: CLINICAL NURSE SPECIALIST

## 2021-12-09 PROCEDURE — 0 GADOBENATE DIMEGLUMINE 529 MG/ML SOLUTION: Performed by: CLINICAL NURSE SPECIALIST

## 2021-12-09 PROCEDURE — 74183 MRI ABD W/O CNTR FLWD CNTR: CPT

## 2021-12-09 RX ADMIN — GADOBENATE DIMEGLUMINE 10 ML: 529 INJECTION, SOLUTION INTRAVENOUS at 08:45

## 2022-02-10 ENCOUNTER — LAB (OUTPATIENT)
Dept: LAB | Facility: HOSPITAL | Age: 77
End: 2022-02-10

## 2022-02-10 ENCOUNTER — TRANSCRIBE ORDERS (OUTPATIENT)
Dept: ADMINISTRATIVE | Facility: HOSPITAL | Age: 77
End: 2022-02-10

## 2022-02-10 DIAGNOSIS — Z53.20: ICD-10-CM

## 2022-02-10 DIAGNOSIS — R94.5 ABNORMAL RESULTS OF LIVER FUNCTION STUDIES: ICD-10-CM

## 2022-02-10 DIAGNOSIS — R94.5 ABNORMAL RESULTS OF LIVER FUNCTION STUDIES: Primary | ICD-10-CM

## 2022-02-10 LAB
ALBUMIN SERPL-MCNC: 4 G/DL (ref 3.5–5)
ALBUMIN/GLOB SERPL: 1.3 G/DL (ref 1.1–2.5)
ALP SERPL-CCNC: 225 U/L (ref 24–120)
ALT SERPL W P-5'-P-CCNC: 51 U/L (ref 0–35)
ANION GAP SERPL CALCULATED.3IONS-SCNC: 3 MMOL/L (ref 4–13)
AST SERPL-CCNC: 73 U/L (ref 7–45)
AUTO MIXED CELLS #: 0.6 10*3/MM3 (ref 0.1–2.6)
AUTO MIXED CELLS %: 9.8 % (ref 0.1–24)
BILIRUB CONJ SERPL-MCNC: <0.2 MG/DL (ref 0–0.3)
BILIRUB SERPL-MCNC: 0.4 MG/DL (ref 0.1–1)
BUN SERPL-MCNC: 14 MG/DL (ref 5–21)
BUN/CREAT SERPL: 28
CALCIUM SPEC-SCNC: 8.8 MG/DL (ref 8.4–10.4)
CHLORIDE SERPL-SCNC: 101 MMOL/L (ref 98–110)
CO2 SERPL-SCNC: 28 MMOL/L (ref 24–31)
CREAT SERPL-MCNC: 0.5 MG/DL (ref 0.5–1.4)
ERYTHROCYTE [DISTWIDTH] IN BLOOD BY AUTOMATED COUNT: 13.3 % (ref 12.3–15.4)
GFR SERPL CREATININE-BSD FRML MDRD: 121 ML/MIN/1.73
GLOBULIN UR ELPH-MCNC: 3.2 GM/DL
GLUCOSE SERPL-MCNC: 106 MG/DL (ref 70–100)
HCT VFR BLD AUTO: 35.7 % (ref 34–46.6)
HGB BLD-MCNC: 11.7 G/DL (ref 12–15.9)
LYMPHOCYTES # BLD AUTO: 1.5 10*3/MM3 (ref 0.7–3.1)
LYMPHOCYTES NFR BLD AUTO: 22.2 % (ref 19.6–45.3)
MCH RBC QN AUTO: 29.7 PG (ref 26.6–33)
MCHC RBC AUTO-ENTMCNC: 32.8 G/DL (ref 31.5–35.7)
MCV RBC AUTO: 90.6 FL (ref 79–97)
NEUTROPHILS NFR BLD AUTO: 4.5 10*3/MM3 (ref 1.7–7)
NEUTROPHILS NFR BLD AUTO: 68 % (ref 42.7–76)
PLATELET # BLD AUTO: 208 10*3/MM3 (ref 140–450)
PMV BLD AUTO: 10.5 FL (ref 6–12)
POTASSIUM SERPL-SCNC: 4 MMOL/L (ref 3.5–5.3)
PROT SERPL-MCNC: 7.2 G/DL (ref 6.3–8.7)
RBC # BLD AUTO: 3.94 10*6/MM3 (ref 3.77–5.28)
SODIUM SERPL-SCNC: 132 MMOL/L (ref 135–145)
WBC NRBC COR # BLD: 6.6 10*3/MM3 (ref 3.4–10.8)

## 2022-02-10 PROCEDURE — 36415 COLL VENOUS BLD VENIPUNCTURE: CPT | Performed by: INTERNAL MEDICINE

## 2022-02-10 PROCEDURE — 85025 COMPLETE CBC W/AUTO DIFF WBC: CPT

## 2022-02-10 PROCEDURE — 82248 BILIRUBIN DIRECT: CPT

## 2022-02-10 PROCEDURE — 80053 COMPREHEN METABOLIC PANEL: CPT | Performed by: INTERNAL MEDICINE

## 2022-05-04 ENCOUNTER — TRANSCRIBE ORDERS (OUTPATIENT)
Dept: ADMINISTRATIVE | Facility: HOSPITAL | Age: 77
End: 2022-05-04

## 2022-05-04 DIAGNOSIS — R94.5 ABNORMAL RESULTS OF LIVER FUNCTION STUDIES: Primary | ICD-10-CM

## 2022-06-13 ENCOUNTER — HOSPITAL ENCOUNTER (OUTPATIENT)
Facility: HOSPITAL | Age: 77
Setting detail: OBSERVATION
Discharge: HOME | End: 2022-06-14
Attending: INTERNAL MEDICINE | Admitting: FAMILY MEDICINE

## 2022-06-13 ENCOUNTER — APPOINTMENT (OUTPATIENT)
Dept: CT IMAGING | Facility: HOSPITAL | Age: 77
End: 2022-06-13

## 2022-06-13 ENCOUNTER — APPOINTMENT (OUTPATIENT)
Dept: GENERAL RADIOLOGY | Facility: HOSPITAL | Age: 77
End: 2022-06-13

## 2022-06-13 DIAGNOSIS — K29.00 ACUTE GASTRITIS WITHOUT HEMORRHAGE, UNSPECIFIED GASTRITIS TYPE: Primary | ICD-10-CM

## 2022-06-13 DIAGNOSIS — K52.9 COLITIS: ICD-10-CM

## 2022-06-13 DIAGNOSIS — R11.2 INTRACTABLE NAUSEA AND VOMITING: ICD-10-CM

## 2022-06-13 PROBLEM — R10.9 ABDOMINAL PAIN: Status: ACTIVE | Noted: 2022-06-13

## 2022-06-13 PROBLEM — R74.01 TRANSAMINITIS: Status: ACTIVE | Noted: 2022-06-13

## 2022-06-13 PROBLEM — E87.1 HYPONATREMIA: Status: ACTIVE | Noted: 2022-06-13

## 2022-06-13 PROBLEM — E83.42 HYPOMAGNESEMIA: Status: ACTIVE | Noted: 2022-06-13

## 2022-06-13 LAB
ALBUMIN SERPL-MCNC: 4.5 G/DL (ref 3.5–5.2)
ALBUMIN/GLOB SERPL: 1.4 G/DL
ALP SERPL-CCNC: 334 U/L (ref 39–117)
ALT SERPL W P-5'-P-CCNC: 72 U/L (ref 1–33)
AMYLASE SERPL-CCNC: 88 U/L (ref 28–100)
ANION GAP SERPL CALCULATED.3IONS-SCNC: 13 MMOL/L (ref 5–15)
AST SERPL-CCNC: 95 U/L (ref 1–32)
BASOPHILS # BLD AUTO: 0.01 10*3/MM3 (ref 0–0.2)
BASOPHILS # BLD AUTO: 0.02 10*3/MM3 (ref 0–0.2)
BASOPHILS NFR BLD AUTO: 0.2 % (ref 0–1.5)
BASOPHILS NFR BLD AUTO: 0.3 % (ref 0–1.5)
BILIRUB SERPL-MCNC: 0.4 MG/DL (ref 0–1.2)
BILIRUB UR QL STRIP: NEGATIVE
BUN SERPL-MCNC: 7 MG/DL (ref 8–23)
BUN/CREAT SERPL: 10.9 (ref 7–25)
CALCIUM SPEC-SCNC: 9.7 MG/DL (ref 8.6–10.5)
CHLORIDE SERPL-SCNC: 97 MMOL/L (ref 98–107)
CLARITY UR: CLEAR
CO2 SERPL-SCNC: 24 MMOL/L (ref 22–29)
COLOR UR: YELLOW
CREAT SERPL-MCNC: 0.64 MG/DL (ref 0.57–1)
DEPRECATED RDW RBC AUTO: 43.8 FL (ref 37–54)
DEPRECATED RDW RBC AUTO: 44.4 FL (ref 37–54)
EGFRCR SERPLBLD CKD-EPI 2021: 92.9 ML/MIN/1.73
EOSINOPHIL # BLD AUTO: 0.01 10*3/MM3 (ref 0–0.4)
EOSINOPHIL # BLD AUTO: 0.01 10*3/MM3 (ref 0–0.4)
EOSINOPHIL NFR BLD AUTO: 0.1 % (ref 0.3–6.2)
EOSINOPHIL NFR BLD AUTO: 0.2 % (ref 0.3–6.2)
ERYTHROCYTE [DISTWIDTH] IN BLOOD BY AUTOMATED COUNT: 13.2 % (ref 12.3–15.4)
ERYTHROCYTE [DISTWIDTH] IN BLOOD BY AUTOMATED COUNT: 13.4 % (ref 12.3–15.4)
GLOBULIN UR ELPH-MCNC: 3.2 GM/DL
GLUCOSE SERPL-MCNC: 135 MG/DL (ref 65–99)
GLUCOSE UR STRIP-MCNC: NEGATIVE MG/DL
HCT VFR BLD AUTO: 40 % (ref 34–46.6)
HCT VFR BLD AUTO: 40.7 % (ref 34–46.6)
HGB BLD-MCNC: 13.4 G/DL (ref 12–15.9)
HGB BLD-MCNC: 13.5 G/DL (ref 12–15.9)
HGB UR QL STRIP.AUTO: NEGATIVE
HOLD SPECIMEN: NORMAL
HOLD SPECIMEN: NORMAL
IMM GRANULOCYTES # BLD AUTO: 0.02 10*3/MM3 (ref 0–0.05)
IMM GRANULOCYTES # BLD AUTO: 0.03 10*3/MM3 (ref 0–0.05)
IMM GRANULOCYTES NFR BLD AUTO: 0.3 % (ref 0–0.5)
IMM GRANULOCYTES NFR BLD AUTO: 0.4 % (ref 0–0.5)
INR PPP: 1.13 (ref 0.91–1.09)
KETONES UR QL STRIP: ABNORMAL
LEUKOCYTE ESTERASE UR QL STRIP.AUTO: NEGATIVE
LIPASE SERPL-CCNC: 4 U/L (ref 13–60)
LIPASE SERPL-CCNC: 5 U/L (ref 13–60)
LYMPHOCYTES # BLD AUTO: 0.94 10*3/MM3 (ref 0.7–3.1)
LYMPHOCYTES # BLD AUTO: 0.97 10*3/MM3 (ref 0.7–3.1)
LYMPHOCYTES NFR BLD AUTO: 14.2 % (ref 19.6–45.3)
LYMPHOCYTES NFR BLD AUTO: 15 % (ref 19.6–45.3)
MAGNESIUM SERPL-MCNC: 1.5 MG/DL (ref 1.6–2.4)
MCH RBC QN AUTO: 30 PG (ref 26.6–33)
MCH RBC QN AUTO: 30.2 PG (ref 26.6–33)
MCHC RBC AUTO-ENTMCNC: 32.9 G/DL (ref 31.5–35.7)
MCHC RBC AUTO-ENTMCNC: 33.8 G/DL (ref 31.5–35.7)
MCV RBC AUTO: 89.5 FL (ref 79–97)
MCV RBC AUTO: 91.1 FL (ref 79–97)
MONOCYTES # BLD AUTO: 0.37 10*3/MM3 (ref 0.1–0.9)
MONOCYTES # BLD AUTO: 0.38 10*3/MM3 (ref 0.1–0.9)
MONOCYTES NFR BLD AUTO: 5.4 % (ref 5–12)
MONOCYTES NFR BLD AUTO: 6.1 % (ref 5–12)
NEUTROPHILS NFR BLD AUTO: 4.91 10*3/MM3 (ref 1.7–7)
NEUTROPHILS NFR BLD AUTO: 5.45 10*3/MM3 (ref 1.7–7)
NEUTROPHILS NFR BLD AUTO: 78.2 % (ref 42.7–76)
NEUTROPHILS NFR BLD AUTO: 79.6 % (ref 42.7–76)
NITRITE UR QL STRIP: NEGATIVE
NRBC BLD AUTO-RTO: 0 /100 WBC (ref 0–0.2)
NRBC BLD AUTO-RTO: 0 /100 WBC (ref 0–0.2)
PH UR STRIP.AUTO: 6.5 [PH] (ref 5–8)
PHOSPHATE SERPL-MCNC: 2.6 MG/DL (ref 2.5–4.5)
PLATELET # BLD AUTO: 246 10*3/MM3 (ref 140–450)
PLATELET # BLD AUTO: 260 10*3/MM3 (ref 140–450)
PMV BLD AUTO: 11.3 FL (ref 6–12)
PMV BLD AUTO: 11.5 FL (ref 6–12)
POTASSIUM SERPL-SCNC: 4 MMOL/L (ref 3.5–5.2)
PROT SERPL-MCNC: 7.7 G/DL (ref 6–8.5)
PROT UR QL STRIP: NEGATIVE
PROTHROMBIN TIME: 14.1 SECONDS (ref 11.9–14.6)
RBC # BLD AUTO: 4.47 10*6/MM3 (ref 3.77–5.28)
RBC # BLD AUTO: 4.47 10*6/MM3 (ref 3.77–5.28)
SARS-COV-2 RNA PNL SPEC NAA+PROBE: NOT DETECTED
SODIUM SERPL-SCNC: 134 MMOL/L (ref 136–145)
SP GR UR STRIP: 1.02 (ref 1–1.03)
TROPONIN T SERPL-MCNC: 0.02 NG/ML (ref 0–0.03)
UROBILINOGEN UR QL STRIP: ABNORMAL
WBC NRBC COR # BLD: 6.27 10*3/MM3 (ref 3.4–10.8)
WBC NRBC COR # BLD: 6.85 10*3/MM3 (ref 3.4–10.8)
WHOLE BLOOD HOLD COAG: NORMAL
WHOLE BLOOD HOLD SPECIMEN: NORMAL

## 2022-06-13 PROCEDURE — 25010000002 PROCHLORPERAZINE 10 MG/2ML SOLUTION: Performed by: NURSE PRACTITIONER

## 2022-06-13 PROCEDURE — 84484 ASSAY OF TROPONIN QUANT: CPT

## 2022-06-13 PROCEDURE — 81003 URINALYSIS AUTO W/O SCOPE: CPT

## 2022-06-13 PROCEDURE — 83690 ASSAY OF LIPASE: CPT

## 2022-06-13 PROCEDURE — C9803 HOPD COVID-19 SPEC COLLECT: HCPCS

## 2022-06-13 PROCEDURE — G0378 HOSPITAL OBSERVATION PER HR: HCPCS

## 2022-06-13 PROCEDURE — 74177 CT ABD & PELVIS W/CONTRAST: CPT

## 2022-06-13 PROCEDURE — 82150 ASSAY OF AMYLASE: CPT | Performed by: NURSE PRACTITIONER

## 2022-06-13 PROCEDURE — 25010000002 IOPAMIDOL 61 % SOLUTION: Performed by: NURSE PRACTITIONER

## 2022-06-13 PROCEDURE — 36415 COLL VENOUS BLD VENIPUNCTURE: CPT

## 2022-06-13 PROCEDURE — 93005 ELECTROCARDIOGRAM TRACING: CPT | Performed by: INTERNAL MEDICINE

## 2022-06-13 PROCEDURE — 85610 PROTHROMBIN TIME: CPT | Performed by: NURSE PRACTITIONER

## 2022-06-13 PROCEDURE — 96365 THER/PROPH/DIAG IV INF INIT: CPT

## 2022-06-13 PROCEDURE — 25010000002 ONDANSETRON PER 1 MG: Performed by: NURSE PRACTITIONER

## 2022-06-13 PROCEDURE — 93010 ELECTROCARDIOGRAM REPORT: CPT | Performed by: INTERNAL MEDICINE

## 2022-06-13 PROCEDURE — 84100 ASSAY OF PHOSPHORUS: CPT | Performed by: NURSE PRACTITIONER

## 2022-06-13 PROCEDURE — 96375 TX/PRO/DX INJ NEW DRUG ADDON: CPT

## 2022-06-13 PROCEDURE — 87635 SARS-COV-2 COVID-19 AMP PRB: CPT | Performed by: NURSE PRACTITIONER

## 2022-06-13 PROCEDURE — 83735 ASSAY OF MAGNESIUM: CPT | Performed by: NURSE PRACTITIONER

## 2022-06-13 PROCEDURE — 85025 COMPLETE CBC W/AUTO DIFF WBC: CPT

## 2022-06-13 PROCEDURE — 80053 COMPREHEN METABOLIC PANEL: CPT

## 2022-06-13 PROCEDURE — 85025 COMPLETE CBC W/AUTO DIFF WBC: CPT | Performed by: NURSE PRACTITIONER

## 2022-06-13 PROCEDURE — 99284 EMERGENCY DEPT VISIT MOD MDM: CPT

## 2022-06-13 PROCEDURE — 83690 ASSAY OF LIPASE: CPT | Performed by: NURSE PRACTITIONER

## 2022-06-13 PROCEDURE — 93005 ELECTROCARDIOGRAM TRACING: CPT

## 2022-06-13 PROCEDURE — 71046 X-RAY EXAM CHEST 2 VIEWS: CPT

## 2022-06-13 RX ORDER — ACETAMINOPHEN 325 MG/1
650 TABLET ORAL EVERY 4 HOURS PRN
Status: DISCONTINUED | OUTPATIENT
Start: 2022-06-13 | End: 2022-06-14 | Stop reason: HOSPADM

## 2022-06-13 RX ORDER — LISINOPRIL 10 MG/1
10 TABLET ORAL
Status: DISCONTINUED | OUTPATIENT
Start: 2022-06-14 | End: 2022-06-14 | Stop reason: HOSPADM

## 2022-06-13 RX ORDER — ONDANSETRON 4 MG/1
4 TABLET, FILM COATED ORAL EVERY 6 HOURS PRN
Status: DISCONTINUED | OUTPATIENT
Start: 2022-06-13 | End: 2022-06-14 | Stop reason: HOSPADM

## 2022-06-13 RX ORDER — PROCHLORPERAZINE EDISYLATE 5 MG/ML
10 INJECTION INTRAMUSCULAR; INTRAVENOUS ONCE
Status: COMPLETED | OUTPATIENT
Start: 2022-06-13 | End: 2022-06-13

## 2022-06-13 RX ORDER — ACETAMINOPHEN 160 MG/5ML
650 SOLUTION ORAL EVERY 4 HOURS PRN
Status: DISCONTINUED | OUTPATIENT
Start: 2022-06-13 | End: 2022-06-14 | Stop reason: HOSPADM

## 2022-06-13 RX ORDER — SODIUM CHLORIDE 0.9 % (FLUSH) 0.9 %
10 SYRINGE (ML) INJECTION AS NEEDED
Status: DISCONTINUED | OUTPATIENT
Start: 2022-06-13 | End: 2022-06-13

## 2022-06-13 RX ORDER — METRONIDAZOLE 500 MG/100ML
500 INJECTION, SOLUTION INTRAVENOUS EVERY 8 HOURS SCHEDULED
Status: DISCONTINUED | OUTPATIENT
Start: 2022-06-13 | End: 2022-06-14 | Stop reason: HOSPADM

## 2022-06-13 RX ORDER — LEVOFLOXACIN 5 MG/ML
500 INJECTION, SOLUTION INTRAVENOUS
Status: DISCONTINUED | OUTPATIENT
Start: 2022-06-13 | End: 2022-06-14 | Stop reason: HOSPADM

## 2022-06-13 RX ORDER — SODIUM CHLORIDE 0.9 % (FLUSH) 0.9 %
10 SYRINGE (ML) INJECTION AS NEEDED
Status: DISCONTINUED | OUTPATIENT
Start: 2022-06-13 | End: 2022-06-14 | Stop reason: HOSPADM

## 2022-06-13 RX ORDER — SODIUM CHLORIDE, SODIUM LACTATE, POTASSIUM CHLORIDE, CALCIUM CHLORIDE 600; 310; 30; 20 MG/100ML; MG/100ML; MG/100ML; MG/100ML
100 INJECTION, SOLUTION INTRAVENOUS CONTINUOUS
Status: DISCONTINUED | OUTPATIENT
Start: 2022-06-13 | End: 2022-06-14 | Stop reason: HOSPADM

## 2022-06-13 RX ORDER — ACETAMINOPHEN 650 MG/1
650 SUPPOSITORY RECTAL EVERY 4 HOURS PRN
Status: DISCONTINUED | OUTPATIENT
Start: 2022-06-13 | End: 2022-06-14 | Stop reason: HOSPADM

## 2022-06-13 RX ORDER — ONDANSETRON 2 MG/ML
4 INJECTION INTRAMUSCULAR; INTRAVENOUS EVERY 6 HOURS PRN
Status: DISCONTINUED | OUTPATIENT
Start: 2022-06-13 | End: 2022-06-14 | Stop reason: HOSPADM

## 2022-06-13 RX ORDER — DIPHENOXYLATE HYDROCHLORIDE AND ATROPINE SULFATE 2.5; .025 MG/1; MG/1
1 TABLET ORAL 4 TIMES DAILY PRN
Status: DISCONTINUED | OUTPATIENT
Start: 2022-06-13 | End: 2022-06-14 | Stop reason: HOSPADM

## 2022-06-13 RX ORDER — SODIUM CHLORIDE 0.9 % (FLUSH) 0.9 %
10 SYRINGE (ML) INJECTION EVERY 12 HOURS SCHEDULED
Status: DISCONTINUED | OUTPATIENT
Start: 2022-06-13 | End: 2022-06-14 | Stop reason: HOSPADM

## 2022-06-13 RX ORDER — MAGNESIUM SULFATE 1 G/100ML
1 INJECTION INTRAVENOUS ONCE
Status: COMPLETED | OUTPATIENT
Start: 2022-06-13 | End: 2022-06-14

## 2022-06-13 RX ORDER — ONDANSETRON 2 MG/ML
8 INJECTION INTRAMUSCULAR; INTRAVENOUS ONCE
Status: COMPLETED | OUTPATIENT
Start: 2022-06-13 | End: 2022-06-13

## 2022-06-13 RX ORDER — BUTALBITAL, ACETAMINOPHEN AND CAFFEINE 50; 325; 40 MG/1; MG/1; MG/1
1 TABLET ORAL 2 TIMES DAILY
Status: DISCONTINUED | OUTPATIENT
Start: 2022-06-14 | End: 2022-06-14

## 2022-06-13 RX ADMIN — SODIUM CHLORIDE, POTASSIUM CHLORIDE, SODIUM LACTATE AND CALCIUM CHLORIDE 100 ML/HR: 600; 310; 30; 20 INJECTION, SOLUTION INTRAVENOUS at 23:01

## 2022-06-13 RX ADMIN — Medication 10 ML: at 23:01

## 2022-06-13 RX ADMIN — METRONIDAZOLE 500 MG: 500 INJECTION, SOLUTION INTRAVENOUS at 23:01

## 2022-06-13 RX ADMIN — SODIUM CHLORIDE, POTASSIUM CHLORIDE, SODIUM LACTATE AND CALCIUM CHLORIDE 1000 ML: 600; 310; 30; 20 INJECTION, SOLUTION INTRAVENOUS at 19:51

## 2022-06-13 RX ADMIN — IOPAMIDOL 75 ML: 612 INJECTION, SOLUTION INTRAVENOUS at 18:58

## 2022-06-13 RX ADMIN — ONDANSETRON 8 MG: 2 INJECTION INTRAMUSCULAR; INTRAVENOUS at 18:26

## 2022-06-13 RX ADMIN — PROCHLORPERAZINE EDISYLATE 10 MG: 5 INJECTION INTRAMUSCULAR; INTRAVENOUS at 19:51

## 2022-06-13 NOTE — ED PROVIDER NOTES
Subjective   Patient is a 76-year-old white female presents emergency department with midepigastric abdominal pain and lower back pain that started yesterday.  She states she has had nausea with vomiting since yesterday as well.  She took Phenergan around noon today and has vomited several times since then.  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  She has had history of Whipple procedure and biliary stents previously.  She denies any fever or chills.  She denies any dysuria.  She follows locally with Dr. Jensen and Dr. Olivier.       History provided by:  Patient   used: No        Review of Systems   Constitutional: Negative.    HENT: Negative.    Eyes: Negative.    Respiratory: Negative.    Cardiovascular: Negative.    Gastrointestinal:        Patient is a 76-year-old white female presents emergency department with midepigastric abdominal pain and lower back pain that started yesterday.  She states she has had nausea with vomiting since yesterday as well.  She took Phenergan around noon today and has vomited several times since then.  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  She has had history of Whipple procedure and biliary stents previously.  She denies any fever or chills.  She denies any dysuria.  She follows locally with Dr. Jensen and Dr. Olivier. She denies black or tarry stools     Endocrine: Negative.    Genitourinary: Negative.    Musculoskeletal: Negative.    Skin: Negative.    Allergic/Immunologic: Negative.    Neurological: Negative.    Hematological: Negative.    Psychiatric/Behavioral: Negative.    All other systems reviewed and are negative.      Past Medical History:   Diagnosis Date   • Asthma    • Cancer (HCC)     Cholangiocarcinoma   • Hypertension    • Migraines        Allergies   Allergen Reactions   • Cephalosporins Rash       Past Surgical History:   Procedure Laterality Date   • CHOLECYSTECTOMY     • COLONOSCOPY   "05/22/2013    Diverticulosis, Hemorrhoids repeat exam in 5 years   • COLONOSCOPY N/A 9/28/2021    Tics otherwise normal exam repeat in 7 years   • COLONOSCOPY W/ POLYPECTOMY  07/26/2010    Hyperplastic polyp cecum, pan-diverticular disease repeat exam in 3 years   • COMMON BILE DUCT EXPLORATION      For carcinoma of the bile duct   • ENDOSCOPY  02/01/2016    Normal exam   • WHIPPLE PROCEDURE  2011       Family History   Problem Relation Age of Onset   • No Known Problems Mother    • No Known Problems Father    • Colon cancer Neg Hx    • Colon polyps Neg Hx        Social History     Socioeconomic History   • Marital status:    Tobacco Use   • Smoking status: Former Smoker   • Smokeless tobacco: Never Used   Substance and Sexual Activity   • Alcohol use: Yes     Comment: Occasional   • Sexual activity: Defer       Prior to Admission medications    Medication Sig Start Date End Date Taking? Authorizing Provider   butalbital-acetaminophen-caffeine (FIORICET, ESGIC) -40 MG per tablet Take 1 tablet by mouth 2 (Two) Times a Day. 9/28/18   Jim Malhotra MD   cyanocobalamin 1000 MCG/ML injection INJECT 1 ML ONCE A MONTH 10/11/18   Jim Malhotra MD   diphenoxylate-atropine (LOMOTIL) 2.5-0.025 MG per tablet Take 1 tablet by mouth 4 (Four) Times a Day As Needed for Diarrhea.    Jim Malhotra MD   lisinopril (PRINIVIL,ZESTRIL) 10 MG tablet  9/17/18   Jim Malhotra MD   LORazepam (ATIVAN) 1 MG tablet  10/18/18   Jim Malhotra MD   promethazine (PHENERGAN) 25 MG tablet Take 25 mg by mouth Every 6 (Six) Hours As Needed for Nausea or Vomiting.    Jim Malhotra MD       /70 (BP Location: Left arm, Patient Position: Lying)   Pulse 71   Temp 99.2 °F (37.3 °C) (Oral)   Resp 16   Ht 157.5 cm (62\")   Wt 48.4 kg (106 lb 12.8 oz)   SpO2 98%   BMI 19.53 kg/m²     Objective   Physical Exam  Vitals and nursing note reviewed.   Constitutional:       Appearance: She is " well-developed.      Comments: Nontoxic-appearing.  No acute distress.   HENT:      Head: Normocephalic and atraumatic.   Eyes:      Conjunctiva/sclera: Conjunctivae normal.      Pupils: Pupils are equal, round, and reactive to light.   Neck:      Thyroid: No thyromegaly.      Trachea: No tracheal deviation.   Cardiovascular:      Rate and Rhythm: Normal rate and regular rhythm.      Heart sounds: Normal heart sounds.   Pulmonary:      Effort: Pulmonary effort is normal. No respiratory distress.      Breath sounds: Normal breath sounds. No wheezing or rales.   Chest:      Chest wall: No tenderness.   Abdominal:      General: Bowel sounds are normal.      Palpations: Abdomen is soft.      Comments: Abdomen is soft, nondistended.  She does have tenderness to the mid epigastric area.  There is no guarding or rebound noted.  Bowel sounds are present in all 4 quadrants.   Musculoskeletal:         General: Normal range of motion.      Cervical back: Normal range of motion and neck supple.   Skin:     General: Skin is warm and dry.      Comments: Mild pallor noted   Neurological:      Mental Status: She is alert and oriented to person, place, and time.      Cranial Nerves: No cranial nerve deficit.      Deep Tendon Reflexes: Reflexes are normal and symmetric.   Psychiatric:         Behavior: Behavior normal.         Thought Content: Thought content normal.         Judgment: Judgment normal.         Procedures         Lab Results (last 24 hours)     Procedure Component Value Units Date/Time    CBC & Differential [[id number]]  (Abnormal) Collected: 06/13/22 1553    Specimen: Blood Updated: 06/13/22 1608    Narrative:      The following orders were created for panel order CBC & Differential.  Procedure                               Abnormality         Status                     ---------                               -----------         ------                     CBC Auto Differential[[id number]]        Abnormal             Final result                 Please view results for these tests on the individual orders.    Comprehensive Metabolic Panel [[id number]]  (Abnormal) Collected: 06/13/22 1553    Specimen: Blood Updated: 06/13/22 1627     Glucose 135 mg/dL      BUN 7 mg/dL      Creatinine 0.64 mg/dL      Sodium 134 mmol/L      Potassium 4.0 mmol/L      Chloride 97 mmol/L      CO2 24.0 mmol/L      Calcium 9.7 mg/dL      Total Protein 7.7 g/dL      Albumin 4.50 g/dL      ALT (SGPT) 72 U/L      AST (SGOT) 95 U/L      Alkaline Phosphatase 334 U/L      Total Bilirubin 0.4 mg/dL      Globulin 3.2 gm/dL      A/G Ratio 1.4 g/dL      BUN/Creatinine Ratio 10.9     Anion Gap 13.0 mmol/L      eGFR 92.9 mL/min/1.73      Comment: National Kidney Foundation and American Society of Nephrology (ASN) Task Force recommended calculation based on the Chronic Kidney Disease Epidemiology Collaboration (CKD-EPI) equation refit without adjustment for race.       Narrative:      GFR Normal >60  Chronic Kidney Disease <60  Kidney Failure <15      Lipase [[id number]]  (Abnormal) Collected: 06/13/22 1553    Specimen: Blood Updated: 06/13/22 1622     Lipase 5 U/L     Troponin [[id number]]  (Normal) Collected: 06/13/22 1553    Specimen: Blood Updated: 06/13/22 1624     Troponin T 0.019 ng/mL     Narrative:      Troponin T Reference Range:  <= 0.03 ng/mL-   Negative for AMI  >0.03 ng/mL-     Abnormal for myocardial necrosis.  Clinicians would have to utilize clinical acumen, EKG, Troponin and serial changes to determine if it is an Acute Myocardial Infarction or myocardial injury due to an underlying chronic condition.       Results may be falsely decreased if patient taking Biotin.      CBC Auto Differential [[id number]]  (Abnormal) Collected: 06/13/22 1553    Specimen: Blood Updated: 06/13/22 1608     WBC 6.27 10*3/mm3      RBC 4.47 10*6/mm3      Hemoglobin 13.4 g/dL      Hematocrit 40.7 %      MCV 91.1 fL      MCH 30.0 pg      MCHC 32.9 g/dL      RDW 13.2 %       RDW-SD 44.4 fl      MPV 11.3 fL      Platelets 246 10*3/mm3      Neutrophil % 78.2 %      Lymphocyte % 15.0 %      Monocyte % 6.1 %      Eosinophil % 0.2 %      Basophil % 0.2 %      Immature Grans % 0.3 %      Neutrophils, Absolute 4.91 10*3/mm3      Lymphocytes, Absolute 0.94 10*3/mm3      Monocytes, Absolute 0.38 10*3/mm3      Eosinophils, Absolute 0.01 10*3/mm3      Basophils, Absolute 0.01 10*3/mm3      Immature Grans, Absolute 0.02 10*3/mm3      nRBC 0.0 /100 WBC     Lipase [[id number]]  (Abnormal) Collected: 06/13/22 1553    Specimen: Blood Updated: 06/13/22 1836     Lipase 4 U/L     Amylase [[id number]]  (Normal) Collected: 06/13/22 1553    Specimen: Blood Updated: 06/13/22 1828     Amylase 88 U/L     Magnesium [[id number]]  (Abnormal) Collected: 06/13/22 1553    Specimen: Blood Updated: 06/13/22 2244     Magnesium 1.5 mg/dL     Phosphorus [[id number]]  (Normal) Collected: 06/13/22 1553    Specimen: Blood Updated: 06/13/22 2244     Phosphorus 2.6 mg/dL     CBC & Differential [[id number]]  (Abnormal) Collected: 06/13/22 1827    Specimen: Blood Updated: 06/13/22 1854    Narrative:      The following orders were created for panel order CBC & Differential.  Procedure                               Abnormality         Status                     ---------                               -----------         ------                     CBC Auto Differential[[id number]]        Abnormal            Final result                 Please view results for these tests on the individual orders.    CBC Auto Differential [[id number]]  (Abnormal) Collected: 06/13/22 1827    Specimen: Blood Updated: 06/13/22 1854     WBC 6.85 10*3/mm3      RBC 4.47 10*6/mm3      Hemoglobin 13.5 g/dL      Hematocrit 40.0 %      MCV 89.5 fL      MCH 30.2 pg      MCHC 33.8 g/dL      RDW 13.4 %      RDW-SD 43.8 fl      MPV 11.5 fL      Platelets 260 10*3/mm3      Neutrophil % 79.6 %      Lymphocyte % 14.2 %      Monocyte % 5.4 %      Eosinophil % 0.1 %       Basophil % 0.3 %      Immature Grans % 0.4 %      Neutrophils, Absolute 5.45 10*3/mm3      Lymphocytes, Absolute 0.97 10*3/mm3      Monocytes, Absolute 0.37 10*3/mm3      Eosinophils, Absolute 0.01 10*3/mm3      Basophils, Absolute 0.02 10*3/mm3      Immature Grans, Absolute 0.03 10*3/mm3      nRBC 0.0 /100 WBC     Urinalysis With Microscopic If Indicated (No Culture) - Urine, Clean Catch [[id number]]  (Abnormal) Collected: 06/13/22 1828    Specimen: Urine, Clean Catch Updated: 06/13/22 1857     Color, UA Yellow     Appearance, UA Clear     pH, UA 6.5     Specific Gravity, UA 1.016     Glucose, UA Negative     Ketones, UA 15 mg/dL (1+)     Bilirubin, UA Negative     Blood, UA Negative     Protein, UA Negative     Leuk Esterase, UA Negative     Nitrite, UA Negative     Urobilinogen, UA 0.2 E.U./dL    Narrative:      Urine microscopic not indicated.    COVID PRE-OP / PRE-PROCEDURE SCREENING ORDER (NO ISOLATION) - Swab, Nasal Cavity [[id number]]  (Normal) Collected: 06/13/22 1830    Specimen: Swab from Nasal Cavity Updated: 06/13/22 2009    Narrative:      The following orders were created for panel order COVID PRE-OP / PRE-PROCEDURE SCREENING ORDER (NO ISOLATION) - Swab, Nasal Cavity.  Procedure                               Abnormality         Status                     ---------                               -----------         ------                     COVID-19,[facility] IN-GEMRAINE...[[id number]]  Normal              Final result                 Please view results for these tests on the individual orders.    COVID-19,[facility] IN-HOUSE,Nasal Swab No Transport Media 3-4 HR TAT - Swab, Nasal Cavity [[id number]]  (Normal) Collected: 06/13/22 1830    Specimen: Swab from Nasal Cavity Updated: 06/13/22 2009     COVID19 Not Detected    Narrative:      Fact sheet for providers: https://www.fda.gov/media/974236/download     Fact sheet for patients: https://www.fda.gov/media/357289/download    Test performed by PCR.    Consider  negative results in combination with clinical observations, patient history, and epidemiological information.    Protime-INR [[id number]]  (Abnormal) Collected: 06/13/22 1913    Specimen: Blood Updated: 06/13/22 1930     Protime 14.1 Seconds      INR 1.13          CT Abdomen Pelvis With Contrast   Final Result   1. There is thickening of the gastric wall involving the mid to distal   body and antrum. The findings are worrisome for gastritis. Other   etiologies are felt to be less likely.   2. Prior Whipple procedure with pancreatic head resection. Mildly   dilated pancreatic duct is stable. There is a biliary stent with air in   the stent and some air within the biliary tree. There are radiopaque   densities in the liver likely related to prior embolic therapy.   3. Wall thickening of the descending colon, sigmoid colon and rectum   suggesting colitis. No small bowel distention is seen.   4. Prominent left gonadal vein and pelvic veins. The finding is stable.   5. Small nonobstructive renal stone. Small fat-containing left inguinal   hernia. Degenerative changes of the spine and hips left greater than   right.       The full report of this exam was immediately signed and available to the   emergency room. The patient is currently in the emergency room.   This report was finalized on 06/13/2022 19:18 by Dr. Michael Wu MD.      XR Chest 2 View   Final Result   1. No acute appearing infiltrate or effusion.   2. Hyperexpansion and bronchial wall thickening.            This report was finalized on 06/13/2022 19:43 by Dr. Michael Wu MD.          ED Course  ED Course as of 06/14/22 0120   Mon Jun 13, 2022 1936 Reviewed test results with pt and pt family. Pt is dry heaving again at this time and is still very nauseated. Have ordered compazine. Pt is noted to have gastritis and colitis. Due to pt persistent nausea, and history will speak with hospitalist regarding pt for further.   [CW]   2028 Spoke with Dr. Plasencia-  hospitalist on call. Advised would keep pt but wanted gi notified before admission due to pt's previous hx of cholangiocarcinoma. Call placed to GI at this time for further.  [CW]   2053 Spoke with Dr. Aldana with GI - has agreed to see pt in consult. Call placed to hospitalist at this time for further.  [CW]   2054 Spoke with Dr. Plasencia- hospitalist on call- has accepted for admission. Pt states that compazine has helped some with nausea. Dr. Aldana will see tomorrow  [CW]      ED Course User Index  [CW] Lou Dewey, APRN          MDM  Number of Diagnoses or Management Options  Acute gastritis without hemorrhage, unspecified gastritis type: new and requires workup  Colitis: new and requires workup  Intractable nausea and vomiting: new and requires workup     Amount and/or Complexity of Data Reviewed  Clinical lab tests: ordered and reviewed  Tests in the radiology section of CPT®: ordered and reviewed  Decide to obtain previous medical records or to obtain history from someone other than the patient: yes    Patient Progress  Patient progress: stable      Final diagnoses:   Acute gastritis without hemorrhage, unspecified gastritis type   Colitis   Intractable nausea and vomiting          Lou Dewey, APRN  06/14/22 0120

## 2022-06-14 VITALS
HEIGHT: 62 IN | TEMPERATURE: 98.1 F | SYSTOLIC BLOOD PRESSURE: 127 MMHG | RESPIRATION RATE: 16 BRPM | BODY MASS INDEX: 19.65 KG/M2 | DIASTOLIC BLOOD PRESSURE: 70 MMHG | HEART RATE: 56 BPM | OXYGEN SATURATION: 97 % | WEIGHT: 106.8 LBS

## 2022-06-14 LAB
ALBUMIN SERPL-MCNC: 3.8 G/DL (ref 3.5–5.2)
ALBUMIN/GLOB SERPL: 1.5 G/DL
ALP SERPL-CCNC: 262 U/L (ref 39–117)
ALT SERPL W P-5'-P-CCNC: 46 U/L (ref 1–33)
ANION GAP SERPL CALCULATED.3IONS-SCNC: 11 MMOL/L (ref 5–15)
AST SERPL-CCNC: 49 U/L (ref 1–32)
BILIRUB SERPL-MCNC: 0.4 MG/DL (ref 0–1.2)
BUN SERPL-MCNC: 7 MG/DL (ref 8–23)
BUN/CREAT SERPL: 13.7 (ref 7–25)
CALCIUM SPEC-SCNC: 8.8 MG/DL (ref 8.6–10.5)
CHLORIDE SERPL-SCNC: 100 MMOL/L (ref 98–107)
CHOLEST SERPL-MCNC: 141 MG/DL (ref 0–200)
CO2 SERPL-SCNC: 24 MMOL/L (ref 22–29)
CREAT SERPL-MCNC: 0.51 MG/DL (ref 0.57–1)
DEPRECATED RDW RBC AUTO: 42.8 FL (ref 37–54)
EGFRCR SERPLBLD CKD-EPI 2021: 98.1 ML/MIN/1.73
ERYTHROCYTE [DISTWIDTH] IN BLOOD BY AUTOMATED COUNT: 13.2 % (ref 12.3–15.4)
GLOBULIN UR ELPH-MCNC: 2.5 GM/DL
GLUCOSE SERPL-MCNC: 123 MG/DL (ref 65–99)
HBA1C MFR BLD: 6.5 % (ref 4.8–5.6)
HCT VFR BLD AUTO: 37 % (ref 34–46.6)
HDLC SERPL-MCNC: 79 MG/DL (ref 40–60)
HGB BLD-MCNC: 12.7 G/DL (ref 12–15.9)
LDLC SERPL CALC-MCNC: 48 MG/DL (ref 0–100)
LDLC/HDLC SERPL: 0.62 {RATIO}
MCH RBC QN AUTO: 30.5 PG (ref 26.6–33)
MCHC RBC AUTO-ENTMCNC: 34.3 G/DL (ref 31.5–35.7)
MCV RBC AUTO: 88.7 FL (ref 79–97)
PLATELET # BLD AUTO: 209 10*3/MM3 (ref 140–450)
PMV BLD AUTO: 11.1 FL (ref 6–12)
POTASSIUM SERPL-SCNC: 4 MMOL/L (ref 3.5–5.2)
PROT SERPL-MCNC: 6.3 G/DL (ref 6–8.5)
QT INTERVAL: 432 MS
QTC INTERVAL: 416 MS
RBC # BLD AUTO: 4.17 10*6/MM3 (ref 3.77–5.28)
SODIUM SERPL-SCNC: 135 MMOL/L (ref 136–145)
TRIGL SERPL-MCNC: 67 MG/DL (ref 0–150)
VLDLC SERPL-MCNC: 14 MG/DL (ref 5–40)
WBC NRBC COR # BLD: 6.65 10*3/MM3 (ref 3.4–10.8)

## 2022-06-14 PROCEDURE — 80061 LIPID PANEL: CPT | Performed by: NURSE PRACTITIONER

## 2022-06-14 PROCEDURE — 25010000002 MAGNESIUM SULFATE IN D5W 1G/100ML (PREMIX) 1-5 GM/100ML-% SOLUTION: Performed by: NURSE PRACTITIONER

## 2022-06-14 PROCEDURE — G0378 HOSPITAL OBSERVATION PER HR: HCPCS

## 2022-06-14 PROCEDURE — 96361 HYDRATE IV INFUSION ADD-ON: CPT

## 2022-06-14 PROCEDURE — 80053 COMPREHEN METABOLIC PANEL: CPT | Performed by: NURSE PRACTITIONER

## 2022-06-14 PROCEDURE — 96367 TX/PROPH/DG ADDL SEQ IV INF: CPT

## 2022-06-14 PROCEDURE — 25010000002 LEVOFLOXACIN PER 250 MG: Performed by: NURSE PRACTITIONER

## 2022-06-14 PROCEDURE — 83036 HEMOGLOBIN GLYCOSYLATED A1C: CPT | Performed by: NURSE PRACTITIONER

## 2022-06-14 PROCEDURE — 85027 COMPLETE CBC AUTOMATED: CPT | Performed by: NURSE PRACTITIONER

## 2022-06-14 PROCEDURE — 36415 COLL VENOUS BLD VENIPUNCTURE: CPT | Performed by: NURSE PRACTITIONER

## 2022-06-14 RX ORDER — BUTALBITAL, ACETAMINOPHEN AND CAFFEINE 50; 325; 40 MG/1; MG/1; MG/1
1 TABLET ORAL 2 TIMES DAILY PRN
Status: DISCONTINUED | OUTPATIENT
Start: 2022-06-14 | End: 2022-06-14 | Stop reason: HOSPADM

## 2022-06-14 RX ORDER — PANTOPRAZOLE SODIUM 40 MG/1
40 TABLET, DELAYED RELEASE ORAL DAILY
Qty: 90 TABLET | Refills: 0 | Status: SHIPPED | OUTPATIENT
Start: 2022-06-14 | End: 2022-06-29

## 2022-06-14 RX ORDER — AMOXICILLIN AND CLAVULANATE POTASSIUM 875; 125 MG/1; MG/1
1 TABLET, FILM COATED ORAL 2 TIMES DAILY
Qty: 20 TABLET | Refills: 0 | Status: SHIPPED | OUTPATIENT
Start: 2022-06-14 | End: 2022-06-24

## 2022-06-14 RX ADMIN — METRONIDAZOLE 500 MG: 500 INJECTION, SOLUTION INTRAVENOUS at 05:39

## 2022-06-14 RX ADMIN — BUTALBITAL, ACETAMINOPHEN AND CAFFEINE 1 TABLET: 50; 325; 40 TABLET ORAL at 08:14

## 2022-06-14 RX ADMIN — LISINOPRIL 10 MG: 10 TABLET ORAL at 08:14

## 2022-06-14 RX ADMIN — BUTALBITAL, ACETAMINOPHEN AND CAFFEINE 1 TABLET: 50; 325; 40 TABLET ORAL at 00:22

## 2022-06-14 RX ADMIN — MAGNESIUM SULFATE IN DEXTROSE 1 G: 10 INJECTION, SOLUTION INTRAVENOUS at 01:42

## 2022-06-14 RX ADMIN — LEVOFLOXACIN 500 MG: 5 INJECTION, SOLUTION INTRAVENOUS at 00:10

## 2022-06-14 NOTE — CASE MANAGEMENT/SOCIAL WORK
Discharge Planning Assessment  [facility]     Patient Name: Candice Kingsley  MRN: [id number]  Today's Date: 6/14/2022    Admit Date: 6/13/2022     Discharge Needs Assessment     Row Name 06/14/22 1017       Living Environment    People in Home spouse    Name(s) of People in Home spouse- Christiano    Current Living Arrangements home    Primary Care Provided by self    Provides Primary Care For no one    Family Caregiver if Needed spouse    Quality of Family Relationships supportive;involved;helpful    Able to Return to Prior Arrangements yes       Resource/Environmental Concerns    Resource/Environmental Concerns none       Transition Planning    Patient/Family Anticipates Transition to home with family    Patient/Family Anticipated Services at Transition none    Transportation Anticipated family or friend will provide       Discharge Needs Assessment    Readmission Within the Last 30 Days no previous admission in last 30 days    Equipment Currently Used at Home none    Concerns to be Addressed no discharge needs identified    Anticipated Changes Related to Illness none    Equipment Needed After Discharge none               Discharge Plan     Row Name 06/14/22 1020       Plan    Plan Home    Patient/Family in Agreement with Plan yes    Plan Comments Spoke with pt to assess for home needs. Pt is independent and no home needs identified. Pt has RX coverage/PCP.  Will follow.              Continued Care and Services - Admitted Since 6/13/2022    Coordination has not been started for this encounter.          Demographic Summary    No documentation.                Functional Status    No documentation.                Psychosocial    No documentation.                Abuse/Neglect    No documentation.                Legal    No documentation.                Substance Abuse    No documentation.                Patient Forms    No documentation.                   JUVENAL Alejnadre

## 2022-06-14 NOTE — DISCHARGE SUMMARY
[facility] Medicine Services  DISCHARGE SUMMARY       Date of Admission: 6/13/2022  Date of Discharge:  6/14/2022  Primary Care Physician: Jae Olivier MD    Presenting Problem/Chief Complaint:  Abdominal pain and vomiting    Final Discharge Diagnoses:  Active Hospital Problems    Diagnosis    • **Colitis    • Abdominal pain    • Nausea & vomiting    • Transaminitis, chronic    • Chronic diarrhea        Consults: None    Procedures Performed: None    Pertinent Test Results:     Imaging Results (All)     Procedure Component Value Units Date/Time    XR Chest 2 View [[id number]] Collected: 06/13/22 1942     Updated: 06/13/22 1946    Narrative:      EXAMINATION:  XR CHEST 2 VW-  6/13/2022 6:38 PM CDT     HISTORY: Hypertension. Asthma.     COMPARISON: 5/23/2011.     TECHNIQUE: 2 views were obtained.     FINDINGS:  There is no infiltrate or effusion. There is mild bronchial  wall thickening. There is mild hyperexpansion. The heart is normal in  size. There are degenerative changes of the spine.       Impression:      1. No acute appearing infiltrate or effusion.  2. Hyperexpansion and bronchial wall thickening.         This report was finalized on 06/13/2022 19:43 by Dr. Michael Wu MD.    CT Abdomen Pelvis With Contrast [[id number]] Collected: 06/13/22 1907     Updated: 06/13/22 1921    Narrative:      EXAMINATION:  CT ABDOMEN PELVIS W CONTRAST-  6/13/2022 6:51 PM CDT     HISTORY: Midepigastric abd pain with nausea nausea and vomiting. There  is a history of cholangiocarcinoma.     TECHNIQUE: Spiral CT was performed of the abdomen and pelvis with IV  contrast. Multiplanar images were reconstructed.     DLP: 227 mGy-cm. Automated dosage reduction technique was utilized to  reduce patient dose.     COMPARISON: 11/9/2021.      LUNG BASES: Heart size is upper limits of normal. There is mild linear  atelectasis in the lung bases. There is a small hiatal hernia.     LIVER AND  SPLEEN: There is a biliary stent. There is air within the  biliary stent. There is air within the biliary tree within the liver. A  focal liver mass is not seen There are radiopaque densities within the  liver likely related to prior embolic therapy.     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  Mild pancreatic duct dilatation remains stable. There are stable coarse  calcifications within the pancreatic body and tail.     KIDNEYS AND ADRENALS: The adrenal glands are normal. The kidneys  demonstrate no acute abnormality. There is a 3-4 mm nonobstructive renal  stone on the right. The ureters are nondilated. The bladder is  unremarkable.     BOWEL: There is thickening of the gastric wall, especially in the mid to  distal body and antrum. Small bowel is nondilated. There does appear to  be some wall thickening of the descending colon, sigmoid colon and  rectum.     OTHER: The uterus is unremarkable. There is atheromatous disease of the  aortoiliac vessels. The left gonadal vein is prominent and there are  prominent veins in the pelvis. The inferior vena cava is not opacified  with contrast due to the phase of imaging. There is a small  fat-containing left inguinal hernia. There are degenerative changes of  the spine and hips left greater than right.       Impression:      1. There is thickening of the gastric wall involving the mid to distal  body and antrum. The findings are worrisome for gastritis. Other  etiologies are felt to be less likely.  2. Prior Whipple procedure with pancreatic head resection. Mildly  dilated pancreatic duct is stable. There is a biliary stent with air in  the stent and some air within the biliary tree. There are radiopaque  densities in the liver likely related to prior embolic therapy.  3. Wall thickening of the descending colon, sigmoid colon and rectum  suggesting colitis. No small bowel distention is seen.  4. Prominent left gonadal vein and pelvic veins.  The finding is stable.  5. Small nonobstructive renal stone. Small fat-containing left inguinal  hernia. Degenerative changes of the spine and hips left greater than  right.     The full report of this exam was immediately signed and available to the  emergency room. The patient is currently in the emergency room.  This report was finalized on 06/13/2022 19:18 by Dr. Michael Wu MD.          LAB RESULTS:      Lab 06/14/22 0621 06/13/22 1913 06/13/22  1827 06/13/22  1553   WBC 6.65  --  6.85 6.27   HEMOGLOBIN 12.7  --  13.5 13.4   HEMATOCRIT 37.0  --  40.0 40.7   PLATELETS 209  --  260 246   NEUTROS ABS  --   --  5.45 4.91   IMMATURE GRANS (ABS)  --   --  0.03 0.02   LYMPHS ABS  --   --  0.97 0.94   MONOS ABS  --   --  0.37 0.38   EOS ABS  --   --  0.01 0.01   MCV 88.7  --  89.5 91.1   PROTIME  --  14.1  --   --          Lab 06/14/22 0621 06/13/22  1553   SODIUM 135* 134*   POTASSIUM 4.0 4.0   CHLORIDE 100 97*   CO2 24.0 24.0   ANION GAP 11.0 13.0   BUN 7* 7*   CREATININE 0.51* 0.64   EGFR 98.1 92.9   GLUCOSE 123* 135*   CALCIUM 8.8 9.7   MAGNESIUM  --  1.5*   PHOSPHORUS  --  2.6   HEMOGLOBIN A1C 6.50*  --          Lab 06/14/22 0621 06/13/22  1553   TOTAL PROTEIN 6.3 7.7   ALBUMIN 3.80 4.50   GLOBULIN 2.5 3.2   ALT (SGPT) 46* 72*   AST (SGOT) 49* 95*   BILIRUBIN 0.4 0.4   ALK PHOS 262* 334*   AMYLASE  --  88   LIPASE  --  4*  5*         Lab 06/13/22 1913 06/13/22  1553   TROPONIN T  --  0.019   PROTIME 14.1  --    INR 1.13*  --          Lab 06/14/22 0621   CHOLESTEROL 141   LDL CHOL 48   HDL CHOL 79*   TRIGLYCERIDES 67             Brief Urine Lab Results  (Last result in the past 365 days)      Color   Clarity   Blood   Leuk Est   Nitrite   Protein   CREAT   Urine HCG        06/13/22 1828 Yellow   Clear   Negative   Negative   Negative   Negative               Microbiology Results (last 10 days)     Procedure Component Value - Date/Time    COVID PRE-OP / PRE-PROCEDURE SCREENING ORDER (NO ISOLATION) - Swab,  Nasal Cavity [[id number]]  (Normal) Collected: 06/13/22 1830    Lab Status: Final result Specimen: Swab from Nasal Cavity Updated: 06/13/22 2009    Narrative:      The following orders were created for panel order COVID PRE-OP / PRE-PROCEDURE SCREENING ORDER (NO ISOLATION) - Swab, Nasal Cavity.  Procedure                               Abnormality         Status                     ---------                               -----------         ------                     COVID-19,[facility] IN-GERMA[facility]...[[id number]]  Normal              Final result                 Please view results for these tests on the individual orders.    COVID-19,[facility] IN-HOUSE,Nasal Swab No Transport Media 3-4 HR TAT - Swab, Nasal Cavity [[id number]]  (Normal) Collected: 06/13/22 1830    Lab Status: Final result Specimen: Swab from Nasal Cavity Updated: 06/13/22 2009     COVID19 Not Detected    Narrative:      Fact sheet for providers: https://www.fda.gov/media/300004/download     Fact sheet for patients: https://www.fda.gov/media/707326/download    Test performed by PCR.    Consider negative results in combination with clinical observations, patient history, and epidemiological information.          History of Present Illness on Day of Discharge: Patient is feeling much better today.  She was initially on a clear liquid diet which was advanced to regular and she tolerated this well.  She feels okay with going home today and following up with Dr. Jensen in 2 weeks.  She denies nausea or vomiting.  She denies chest pain or shortness of breath.  .    Hospital Course:  The patient is a 76 y.o. female who follows with Dr. Olivier for primary care.  She has a past medical history significant for asthma, hypertension, migraines, cholangiocarcinoma. She presented to the emergency department with complaints of abdominal pain and vomiting which started on the evening of 6/12.  She had multiple episodes of vomiting throughout the night into the following day.   "She complained of epigastric pain that radiated to her back.  Work-up revealed mild hyponatremia, transaminitis, hypomagnesemia, and CT of abdomen and pelvis showed colitis.  She was admitted to the hospitalist service for further evaluation.    She was started on IV Levaquin and Flagyl.  She was placed on a clear liquid diet.  GI was initially consulted but after evaluating the patient this consult was canceled.  The patient reports that she is feeling much better today.  Her nausea has resolved.  She is tolerating a regular diet this afternoon.  She denies vomiting or diarrhea today.  WBC is 6.65.  Other labs are stable.  Feel that patient is stable for discharge today.  Will start on Protonix and transition IV antibiotics to Augmentin due to intolerance to Levaquin per patient report.  She will follow-up with Dr. Jensen in 2 weeks.    The patient is agreeable to this plan.  She does state that she feels much better today.  Labs been reviewed.  Home medications reviewed and resumed as appropriate.  Patient is medically stable for discharge.    Condition on Discharge: Medically stable    Physical Exam on Discharge:  /70 (BP Location: Right arm, Patient Position: Lying)   Pulse 56   Temp 98.1 °F (36.7 °C) (Oral)   Resp 16   Ht 157.5 cm (62\")   Wt 48.4 kg (106 lb 12.8 oz)   SpO2 97%   BMI 19.53 kg/m²   Physical Exam  Vitals and nursing note reviewed.   Constitutional:       General: She is not in acute distress.     Appearance: Normal appearance. She is not ill-appearing.   HENT:      Head: Normocephalic.   Cardiovascular:      Rate and Rhythm: Normal rate.      Pulses: Normal pulses.      Heart sounds: Normal heart sounds. No murmur heard.     Comments: Heart rate 56  Pulmonary:      Effort: Pulmonary effort is normal. No respiratory distress.      Breath sounds: Normal breath sounds. No wheezing or rhonchi.      Comments: Room air  Abdominal:      General: Bowel sounds are normal. There is no " distension.      Palpations: Abdomen is soft.      Tenderness: There is no abdominal tenderness.   Musculoskeletal:         General: Normal range of motion.      Cervical back: Normal range of motion. No rigidity or tenderness.   Skin:     General: Skin is warm and dry.      Capillary Refill: Capillary refill takes less than 2 seconds.      Findings: No bruising, erythema or rash.   Neurological:      Mental Status: She is alert and oriented to person, place, and time. Mental status is at baseline.      Motor: No weakness.   Psychiatric:         Mood and Affect: Mood normal.         Behavior: Behavior normal.         Thought Content: Thought content normal.         Judgment: Judgment normal.          Discharge Disposition:  Home or Self Care    Discharge Medications:     Discharge Medications      New Medications      Instructions Start Date   amoxicillin-clavulanate 875-125 MG per tablet  Commonly known as: Augmentin   1 tablet, Oral, 2 Times Daily      pantoprazole 40 MG EC tablet  Commonly known as: Protonix   40 mg, Oral, Daily         Continue These Medications      Instructions Start Date   butalbital-acetaminophen-caffeine -40 MG per tablet  Commonly known as: FIORICET, ESGIC   1 tablet, Oral, 2 Times Daily      cyanocobalamin 1000 MCG/ML injection   INJECT 1 ML ONCE A MONTH      diphenoxylate-atropine 2.5-0.025 MG per tablet  Commonly known as: LOMOTIL   1 tablet, Oral, 4 Times Daily PRN      lisinopril 10 MG tablet  Commonly known as: PRINIVIL,ZESTRIL   No dose, route, or frequency recorded.      LORazepam 1 MG tablet  Commonly known as: ATIVAN   1 mg, Oral, Every 8 Hours PRN      promethazine 25 MG tablet  Commonly known as: PHENERGAN   25 mg, Oral, Every 6 Hours PRN             Discharge Diet:   Diet Instructions     Diet: Regular; Thin      Discharge Diet: Regular    Fluid Consistency: Thin    [place]          Activity at Discharge:   Activity Instructions     Activity as Tolerated             Discharge Care Plan/Instructions:   1.  Follow-up with PCP in 1 week or sooner for new or worsening symptoms  2.  Follow-up with Dr. Jensen in 2 weeks  3.  Start Protonix daily  4.  Start Augmentin x10 days due to intolerance to Levaquin.    Follow-up Appointments:   No future appointments.    Test Results Pending at Discharge: None    Electronically signed by ADE Ling, 06/14/22, 13:42 CDT.    Time: 35 minutes

## 2022-06-14 NOTE — H&P
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  Patient presents to [facility] emergency department with complaints of abdominal pain and vomiting starting yesterday evening.  She has had multiple episodes of vomiting throughout last night and today.  She reports pain is in the epigastric region that radiates to her back.  She states yesterday pain was cramping in nature and today after treatment it is improved.  Nausea remains without vomiting despite treatment.  I did awaken patient for assessment in she is mildly confused over time and date.   Christiano is at bedside and provides history.  He states prior to her receiving treatment for nausea as she was at her baseline, alert and oriented.  Patient has no other complaints.  Work-up reveals mild hyponatremia, transaminitis, hypomagnesemia, CT of abdomen pelvis positive for colitis.  She is admitted for further evaluation treatment.    Review of Systems   A 10 point review of systems was completed, all negative except for those discussed in HPI    Past Medical History:   Past Medical History:   Diagnosis Date   • Asthma    • Cancer (HCC)     Cholangiocarcinoma   • Hypertension    • Migraines        Past Surgical History:   Past Surgical History:   Procedure Laterality Date   • CHOLECYSTECTOMY     • COLONOSCOPY  05/22/2013    Diverticulosis, Hemorrhoids repeat exam in 5 years   • COLONOSCOPY N/A 9/28/2021    Tics otherwise normal exam repeat in 7 years   • COLONOSCOPY W/ POLYPECTOMY  07/26/2010    Hyperplastic polyp cecum, pan-diverticular disease repeat exam in 3 years   • COMMON BILE DUCT  "EXPLORATION      For carcinoma of the bile duct   • ENDOSCOPY  02/01/2016    Normal exam   • WHIPPLE PROCEDURE  2011       Family History: family history includes No Known Problems in her father and mother.    Social History:  reports that she has quit smoking. She has never used smokeless tobacco. She reports current alcohol use.    Code Status: Full, so made her speak for herself her 's unable speak for her      Allergies:  Allergies   Allergen Reactions   • Cephalosporins Rash       Medications:  Prior to Admission medications    Medication Sig Start Date End Date Taking? Authorizing Provider   butalbital-acetaminophen-caffeine (FIORICET, ESGIC) -40 MG per tablet Take 1 tablet by mouth 2 (Two) Times a Day. 9/28/18   Jim Malhotra MD   cyanocobalamin 1000 MCG/ML injection INJECT 1 ML ONCE A MONTH 10/11/18   Jim Malhotra MD   diphenoxylate-atropine (LOMOTIL) 2.5-0.025 MG per tablet Take 1 tablet by mouth 4 (Four) Times a Day As Needed for Diarrhea.    Jim Malhotra MD   lisinopril (PRINIVIL,ZESTRIL) 10 MG tablet  9/17/18   Jim Malhotra MD   LORazepam (ATIVAN) 1 MG tablet  10/18/18   Jim Malhotra MD   promethazine (PHENERGAN) 25 MG tablet Take 25 mg by mouth Every 6 (Six) Hours As Needed for Nausea or Vomiting.    Jim Malhotra MD     I have utilized all available immediate resources to obtain, update, and review the patient's current medications.    Objective     /70 (BP Location: Left arm, Patient Position: Lying)   Pulse 71   Temp 99.2 °F (37.3 °C) (Oral)   Resp 16   Ht 157.5 cm (62\")   Wt 48.4 kg (106 lb 12.8 oz)   SpO2 98%   BMI 19.53 kg/m²   Physical Exam  Vitals reviewed.   Constitutional:       Appearance: She is ill-appearing.   HENT:      Head: Normocephalic and atraumatic.      Mouth/Throat:      Mouth: Mucous membranes are dry.      Pharynx: Oropharynx is clear.   Eyes:      Extraocular Movements: Extraocular movements intact. "      Conjunctiva/sclera: Conjunctivae normal.   Cardiovascular:      Rate and Rhythm: Regular rhythm. Bradycardia present.   Pulmonary:      Effort: Pulmonary effort is normal.      Breath sounds: Normal breath sounds.   Abdominal:      General: There is no distension.      Palpations: Abdomen is soft.      Tenderness: There is abdominal tenderness.   Musculoskeletal:      Cervical back: Normal range of motion and neck supple.      Right lower leg: No edema.      Left lower leg: No edema.      Comments: Generalized weakness and debility   Skin:     General: Skin is warm and dry.   Neurological:      Mental Status: She is alert.      Comments: Disoriented upon awakening, reorients easily   Psychiatric:         Mood and Affect: Mood normal.         Behavior: Behavior normal.       Pertinent Data:   Lab Results (last 72 hours)     Procedure Component Value Units Date/Time    Phosphorus [[id number]]  (Normal) Collected: 06/13/22 1553    Specimen: Blood Updated: 06/13/22 2244     Phosphorus 2.6 mg/dL     Magnesium [[id number]]  (Abnormal) Collected: 06/13/22 1553    Specimen: Blood Updated: 06/13/22 2244     Magnesium 1.5 mg/dL     COVID-19,[facility] IN-HOUSE,Nasal Swab No Transport Media 3-4 HR TAT - Swab, Nasal Cavity [[id number]]  (Normal) Collected: 06/13/22 1830    Specimen: Swab from Nasal Cavity Updated: 06/13/22 2009     COVID19 Not Detected    Protime-INR [[id number]]  (Abnormal) Collected: 06/13/22 1913    Specimen: Blood Updated: 06/13/22 1930     Protime 14.1 Seconds      INR 1.13    Urinalysis With Microscopic If Indicated (No Culture) - Urine, Clean Catch [[id number]]  (Abnormal) Collected: 06/13/22 1828    Specimen: Urine, Clean Catch Updated: 06/13/22 1857     Color, UA Yellow     Appearance, UA Clear     pH, UA 6.5     Specific Gravity, UA 1.016     Glucose, UA Negative     Ketones, UA 15 mg/dL (1+)     Bilirubin, UA Negative     Blood, UA Negative     Protein, UA Negative     Leuk Esterase, UA Negative      Nitrite, UA Negative     Urobilinogen, UA 0.2 E.U./dL    CBC Auto Differential [[id number]]  (Abnormal) Collected: 06/13/22 1827    Specimen: Blood Updated: 06/13/22 1854     WBC 6.85 10*3/mm3      RBC 4.47 10*6/mm3      Hemoglobin 13.5 g/dL      Hematocrit 40.0 %      MCV 89.5 fL      MCH 30.2 pg      MCHC 33.8 g/dL      RDW 13.4 %      RDW-SD 43.8 fl      MPV 11.5 fL      Platelets 260 10*3/mm3      Neutrophil % 79.6 %      Lymphocyte % 14.2 %      Monocyte % 5.4 %      Eosinophil % 0.1 %      Basophil % 0.3 %      Immature Grans % 0.4 %      Neutrophils, Absolute 5.45 10*3/mm3      Lymphocytes, Absolute 0.97 10*3/mm3      Monocytes, Absolute 0.37 10*3/mm3      Eosinophils, Absolute 0.01 10*3/mm3      Basophils, Absolute 0.02 10*3/mm3      Immature Grans, Absolute 0.03 10*3/mm3      nRBC 0.0 /100 WBC     Lipase [[id number]]  (Abnormal) Collected: 06/13/22 1553    Specimen: Blood Updated: 06/13/22 1836     Lipase 4 U/L     Amylase [[id number]]  (Normal) Collected: 06/13/22 1553    Specimen: Blood Updated: 06/13/22 1828     Amylase 88 U/L     Comprehensive Metabolic Panel [[id number]]  (Abnormal) Collected: 06/13/22 1553    Specimen: Blood Updated: 06/13/22 1627     Glucose 135 mg/dL      BUN 7 mg/dL      Creatinine 0.64 mg/dL      Sodium 134 mmol/L      Potassium 4.0 mmol/L      Chloride 97 mmol/L      CO2 24.0 mmol/L      Calcium 9.7 mg/dL      Total Protein 7.7 g/dL      Albumin 4.50 g/dL      ALT (SGPT) 72 U/L      AST (SGOT) 95 U/L      Alkaline Phosphatase 334 U/L      Total Bilirubin 0.4 mg/dL      Globulin 3.2 gm/dL      A/G Ratio 1.4 g/dL      BUN/Creatinine Ratio 10.9     Anion Gap 13.0 mmol/L      eGFR 92.9 mL/min/1.73     Troponin [[id number]]  (Normal) Collected: 06/13/22 1553    Specimen: Blood Updated: 06/13/22 1624     Troponin T 0.019 ng/mL     Lipase [[id number]]  (Abnormal) Collected: 06/13/22 1553    Specimen: Blood Updated: 06/13/22 1622     Lipase 5 U/L     CBC Auto Differential [[id number]]   (Abnormal) Collected: 06/13/22 1553    Specimen: Blood Updated: 06/13/22 1608     WBC 6.27 10*3/mm3      RBC 4.47 10*6/mm3      Hemoglobin 13.4 g/dL      Hematocrit 40.7 %      MCV 91.1 fL      MCH 30.0 pg      MCHC 32.9 g/dL      RDW 13.2 %      RDW-SD 44.4 fl      MPV 11.3 fL      Platelets 246 10*3/mm3      Neutrophil % 78.2 %      Lymphocyte % 15.0 %      Monocyte % 6.1 %      Eosinophil % 0.2 %      Basophil % 0.2 %      Immature Grans % 0.3 %      Neutrophils, Absolute 4.91 10*3/mm3      Lymphocytes, Absolute 0.94 10*3/mm3      Monocytes, Absolute 0.38 10*3/mm3      Eosinophils, Absolute 0.01 10*3/mm3      Basophils, Absolute 0.01 10*3/mm3      Immature Grans, Absolute 0.02 10*3/mm3      nRBC 0.0 /100 WBC         Imaging Results (Last 24 Hours)     Procedure Component Value Units Date/Time    XR Chest 2 View [[id number]] Collected: 06/13/22 1942     Updated: 06/13/22 1946    Narrative:      EXAMINATION:  XR CHEST 2 VW-  6/13/2022 6:38 PM CDT     HISTORY: Hypertension. Asthma.     COMPARISON: 5/23/2011.     TECHNIQUE: 2 views were obtained.     FINDINGS:  There is no infiltrate or effusion. There is mild bronchial  wall thickening. There is mild hyperexpansion. The heart is normal in  size. There are degenerative changes of the spine.       Impression:      1. No acute appearing infiltrate or effusion.  2. Hyperexpansion and bronchial wall thickening.         This report was finalized on 06/13/2022 19:43 by Dr. Michael Wu MD.    CT Abdomen Pelvis With Contrast [[id number]] Collected: 06/13/22 1907     Updated: 06/13/22 1921    Narrative:      EXAMINATION:  CT ABDOMEN PELVIS W CONTRAST-  6/13/2022 6:51 PM CDT     HISTORY: Midepigastric abd pain with nausea nausea and vomiting. There  is a history of cholangiocarcinoma.     TECHNIQUE: Spiral CT was performed of the abdomen and pelvis with IV  contrast. Multiplanar images were reconstructed.     DLP: 227 mGy-cm. Automated dosage reduction technique was utilized  to  reduce patient dose.     COMPARISON: 11/9/2021.      LUNG BASES: Heart size is upper limits of normal. There is mild linear  atelectasis in the lung bases. There is a small hiatal hernia.     LIVER AND SPLEEN: There is a biliary stent. There is air within the  biliary stent. There is air within the biliary tree within the liver. A  focal liver mass is not seen There are radiopaque densities within the  liver likely related to prior embolic therapy.     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  Mild pancreatic duct dilatation remains stable. There are stable coarse  calcifications within the pancreatic body and tail.     KIDNEYS AND ADRENALS: The adrenal glands are normal. The kidneys  demonstrate no acute abnormality. There is a 3-4 mm nonobstructive renal  stone on the right. The ureters are nondilated. The bladder is  unremarkable.     BOWEL: There is thickening of the gastric wall, especially in the mid to  distal body and antrum. Small bowel is nondilated. There does appear to  be some wall thickening of the descending colon, sigmoid colon and  rectum.     OTHER: The uterus is unremarkable. There is atheromatous disease of the  aortoiliac vessels. The left gonadal vein is prominent and there are  prominent veins in the pelvis. The inferior vena cava is not opacified  with contrast due to the phase of imaging. There is a small  fat-containing left inguinal hernia. There are degenerative changes of  the spine and hips left greater than right.       Impression:      1. There is thickening of the gastric wall involving the mid to distal  body and antrum. The findings are worrisome for gastritis. Other  etiologies are felt to be less likely.  2. Prior Whipple procedure with pancreatic head resection. Mildly  dilated pancreatic duct is stable. There is a biliary stent with air in  the stent and some air within the biliary tree. There are radiopaque  densities in the liver likely  related to prior embolic therapy.  3. Wall thickening of the descending colon, sigmoid colon and rectum  suggesting colitis. No small bowel distention is seen.  4. Prominent left gonadal vein and pelvic veins. The finding is stable.  5. Small nonobstructive renal stone. Small fat-containing left inguinal  hernia. Degenerative changes of the spine and hips left greater than  right.     The full report of this exam was immediately signed and available to the  emergency room. The patient is currently in the emergency room.  This report was finalized on 06/13/2022 19:18 by Dr. Michael Wu MD.          I have personally reviewed and interpreted the ECG obtained at time of admission.     Assessment / Plan     Assessment:   Active Hospital Problems    Diagnosis    • **Colitis    • Abdominal pain    • Nausea & vomiting    • Transaminitis, chronic    • Chronic diarrhea    • Hypomagnesemia    • Hyponatremia        Plan:   1.  Admit as observation  2.  Start Flagyl and Levaquin  3.  Patient received 1 L LR in the emergency department, continue 100 mL/hour  4.  Pain and nausea management  5.  Clear liquid diet  6.  Labs in a.m.  7.  Home medications reviewed and restarted as appropriate  8.  DVT prophylaxis with SCD  9.  GI consulted by emergency provider, case discussed- admission felt warranted with GI consulting    I discussed the patient's findings and my recommendations with: Anuj Plasencia MD  Time spent 40 minutes    Patient seen and examined by me on 6/13/2022 at 9:16 PM.    Electronically signed by ADE Grider, 06/13/22, 23:04 CDT.    This visit was performed by both a physician and an APC. I personally evaluated and examined the patient. I performed all aspects of the MDM as documented.  Patient valuated time of admission.  Agree with antibiotics GI to follow.    Electronically signed by Anuj Plasencia MD, 6/14/2022, 06:17 CDT.

## 2022-06-14 NOTE — PLAN OF CARE
Goal Outcome Evaluation:              Outcome Evaluation: A&Ox4. VSS. Medicated for c/o headache with relief noted. C/O abd pain. Abd nondistended. Con't to have nausea. Up x1 assist. Voiding without difficulty. Safety maintained.

## 2022-06-14 NOTE — PLAN OF CARE
Goal Outcome Evaluation:    Alert and oriented. Denies n/v/d or pain at this time. Tolerated lunch without difficulties on a bland diet. Discharge instructions given to patient with  at bedside. States understanding. Transported per w/c with staff to private car with spouse. Stable upon discharge.

## 2022-06-29 ENCOUNTER — OFFICE VISIT (OUTPATIENT)
Dept: GASTROENTEROLOGY | Facility: CLINIC | Age: 77
End: 2022-06-29

## 2022-06-29 VITALS
WEIGHT: 111 LBS | TEMPERATURE: 97.3 F | HEIGHT: 62 IN | HEART RATE: 82 BPM | DIASTOLIC BLOOD PRESSURE: 68 MMHG | BODY MASS INDEX: 20.43 KG/M2 | OXYGEN SATURATION: 98 % | SYSTOLIC BLOOD PRESSURE: 118 MMHG

## 2022-06-29 DIAGNOSIS — K52.9 COLITIS: Primary | ICD-10-CM

## 2022-06-29 DIAGNOSIS — I10 HTN (HYPERTENSION), BENIGN: ICD-10-CM

## 2022-06-29 DIAGNOSIS — Z78.9 NONSMOKER: ICD-10-CM

## 2022-06-29 PROCEDURE — 99213 OFFICE O/P EST LOW 20 MIN: CPT | Performed by: CLINICAL NURSE SPECIALIST

## 2022-06-29 NOTE — PROGRESS NOTES
Candice Kingsley  1945 6/29/2022  Chief Complaint   Patient presents with   • GI Problem     Colitis     Subjective   HPI  Candice Kingsley is a 76 y.o. female who presents with a complaint of recent hospitalization due to nausea and GI illness with diarrhea. She says that it started acute onset on 6/14/22. She took a Phenergan and started vomiting. No fever chills or sweats.  CT showed gastritis and colitis. We reviewed this today. She is better at this time. She has no current diarrhea or rectal bleeding. She has had a fairly recent colonoscopy in September 2021. No nausea or vomiting. She has hx of cholangiocarcinoma s/p resection.        Past Medical History:   Diagnosis Date   • Asthma    • Cancer (HCC)     Cholangiocarcinoma   • Hypertension    • Migraines      Past Surgical History:   Procedure Laterality Date   • CHOLECYSTECTOMY     • COLONOSCOPY  05/22/2013    Diverticulosis, Hemorrhoids repeat exam in 5 years   • COLONOSCOPY N/A 9/28/2021    Tics otherwise normal exam repeat in 7 years   • COLONOSCOPY W/ POLYPECTOMY  07/26/2010    Hyperplastic polyp cecum, pan-diverticular disease repeat exam in 3 years   • COMMON BILE DUCT EXPLORATION      For carcinoma of the bile duct   • ENDOSCOPY  02/01/2016    Normal exam   • WHIPPLE PROCEDURE  2011       Outpatient Medications Marked as Taking for the 6/29/22 encounter (Office Visit) with Rosana Ding APRN   Medication Sig Dispense Refill   • butalbital-acetaminophen-caffeine (FIORICET, ESGIC) -40 MG per tablet Take 1 tablet by mouth 2 (Two) Times a Day.  0   • cyanocobalamin 1000 MCG/ML injection INJECT 1 ML ONCE A MONTH  2   • diphenoxylate-atropine (LOMOTIL) 2.5-0.025 MG per tablet Take 1 tablet by mouth 4 (Four) Times a Day As Needed for Diarrhea.     • lisinopril (PRINIVIL,ZESTRIL) 10 MG tablet      • LORazepam (ATIVAN) 1 MG tablet Take 1 mg by mouth Every 8 (Eight) Hours As Needed.     • promethazine (PHENERGAN) 25 MG tablet Take 25 mg by  mouth Every 6 (Six) Hours As Needed for Nausea or Vomiting.       Allergies   Allergen Reactions   • Cephalosporins Rash     Social History     Socioeconomic History   • Marital status:    Tobacco Use   • Smoking status: Former Smoker   • Smokeless tobacco: Never Used   Substance and Sexual Activity   • Alcohol use: Yes     Comment: Occasional   • Sexual activity: Defer     Family History   Problem Relation Age of Onset   • No Known Problems Mother    • No Known Problems Father    • Colon cancer Neg Hx    • Colon polyps Neg Hx      Health Maintenance   Topic Date Due   • TDAP/TD VACCINES (1 - Tdap) Never done   • ZOSTER VACCINE (1 of 2) Never done   • Pneumococcal Vaccine 65+ (1 - PCV) Never done   • ANNUAL WELLNESS VISIT  02/08/2018   • MAMMOGRAM  09/11/2021   • DXA SCAN  09/11/2021   • COVID-19 Vaccine (4 - Booster for Pfizer series) 01/09/2022   • INFLUENZA VACCINE  10/01/2022   • COLORECTAL CANCER SCREENING  09/28/2028   • HEPATITIS C SCREENING  Completed     Review of Systems   Constitutional: Negative for activity change, appetite change, chills, diaphoresis, fatigue, fever and unexpected weight change.   HENT: Negative for ear pain, hearing loss, mouth sores, sore throat, trouble swallowing and voice change.    Eyes: Negative.    Respiratory: Negative for cough, choking, shortness of breath and wheezing.    Cardiovascular: Negative for chest pain and palpitations.   Gastrointestinal: Negative for abdominal pain, blood in stool, constipation, diarrhea, nausea and vomiting.   Endocrine: Negative for cold intolerance and heat intolerance.   Genitourinary: Negative for decreased urine volume, dysuria, frequency, hematuria and urgency.   Musculoskeletal: Negative for back pain, gait problem and myalgias.   Skin: Negative for color change, pallor and rash.   Allergic/Immunologic: Negative for food allergies and immunocompromised state.   Neurological: Negative for dizziness, tremors, seizures, syncope,  "weakness, light-headedness, numbness and headaches.   Hematological: Negative for adenopathy. Does not bruise/bleed easily.   Psychiatric/Behavioral: Negative for agitation and confusion. The patient is not nervous/anxious.    All other systems reviewed and are negative.    Objective   Vitals:    06/29/22 1324   BP: 118/68   Pulse: 82   Temp: 97.3 °F (36.3 °C)   SpO2: 98%   Weight: 50.3 kg (111 lb)   Height: 157.5 cm (62\")     Body mass index is 20.3 kg/m².  Physical Exam  Constitutional:       Appearance: She is well-developed.   HENT:      Head: Normocephalic and atraumatic.   Eyes:      Pupils: Pupils are equal, round, and reactive to light.   Neck:      Trachea: No tracheal deviation.   Cardiovascular:      Rate and Rhythm: Normal rate and regular rhythm.      Heart sounds: Normal heart sounds. No murmur heard.    No friction rub. No gallop.   Pulmonary:      Effort: Pulmonary effort is normal. No respiratory distress.      Breath sounds: Normal breath sounds. No wheezing or rales.   Chest:      Chest wall: No tenderness.   Abdominal:      General: Bowel sounds are normal. There is no distension.      Palpations: Abdomen is soft. Abdomen is not rigid.      Tenderness: There is no abdominal tenderness. There is no guarding or rebound.   Musculoskeletal:         General: No tenderness or deformity. Normal range of motion.      Cervical back: Normal range of motion and neck supple.   Skin:     General: Skin is warm and dry.      Coloration: Skin is not pale.      Findings: No rash.   Neurological:      Mental Status: She is alert and oriented to person, place, and time.      Deep Tendon Reflexes: Reflexes are normal and symmetric.   Psychiatric:         Behavior: Behavior normal.         Thought Content: Thought content normal.         Judgment: Judgment normal.       Assessment & Plan   Diagnoses and all orders for this visit:    1. Colitis (Primary)    2. Nonsmoker    3. HTN (hypertension), benign    she does not " desire procedure at this time. She is better. She has PPI available and I advised her to take if she starts meloxicam for her shoulder. I also explained Ct findings and she is to call with any recurrent symptoms.       Part of this note may be an electronic transcription/translation of spoken language to printed text using the Dragon Dictation System.  Body mass index is 20.3 kg/m².  No follow-ups on file.    BMI is within normal parameters. No other follow-up for BMI required.      All risks, benefits, alternatives, and indications of colonoscopy and/or Endoscopy procedure have been discussed with the patient. Risks to include perforation of the colon requiring possible surgery or colostomy, risk of bleeding from biopsies or removal of colon tissue, possibility of missing a colon polyp or cancer, or adverse drug reaction.  Benefits to include the diagnosis and management of disease of the colon and rectum. Alternatives to include barium enema, radiographic evaluation, lab testing or no intervention. Pt verbalizes understanding and agrees.     Rosana Ding, APRN  6/29/2022  14:02 CDT          If you smoke or use tobacco, 4 minutes reading provided  Steps to Quit Smoking  Smoking tobacco can be harmful to your health and can affect almost every organ in your body. Smoking puts you, and those around you, at risk for developing many serious chronic diseases. Quitting smoking is difficult, but it is one of the best things that you can do for your health. It is never too late to quit.  What are the benefits of quitting smoking?  When you quit smoking, you lower your risk of developing serious diseases and conditions, such as:  · Lung cancer or lung disease, such as COPD.  · Heart disease.  · Stroke.  · Heart attack.  · Infertility.  · Osteoporosis and bone fractures.  Additionally, symptoms such as coughing, wheezing, and shortness of breath may get better when you quit. You may also find that you get sick less  often because your body is stronger at fighting off colds and infections. If you are pregnant, quitting smoking can help to reduce your chances of having a baby of low birth weight.  How do I get ready to quit?  When you decide to quit smoking, create a plan to make sure that you are successful. Before you quit:  · Pick a date to quit. Set a date within the next two weeks to give you time to prepare.  · Write down the reasons why you are quitting. Keep this list in places where you will see it often, such as on your bathroom mirror or in your car or wallet.  · Identify the people, places, things, and activities that make you want to smoke (triggers) and avoid them. Make sure to take these actions:  ¨ Throw away all cigarettes at home, at work, and in your car.  ¨ Throw away smoking accessories, such as ashtrays and lighters.  ¨ Clean your car and make sure to empty the ashtray.  ¨ Clean your home, including curtains and carpets.  · Tell your family, friends, and coworkers that you are quitting. Support from your loved ones can make quitting easier.  · Talk with your health care provider about your options for quitting smoking.  · Find out what treatment options are covered by your health insurance.  What strategies can I use to quit smoking?  Talk with your healthcare provider about different strategies to quit smoking. Some strategies include:  · Quitting smoking altogether instead of gradually lessening how much you smoke over a period of time. Research shows that quitting “cold turkey” is more successful than gradually quitting.  · Attending in-person counseling to help you build problem-solving skills. You are more likely to have success in quitting if you attend several counseling sessions. Even short sessions of 10 minutes can be effective.  · Finding resources and support systems that can help you to quit smoking and remain smoke-free after you quit. These resources are most helpful when you use them often.  They can include:  ¨ Online chats with a counselor.  ¨ Telephone quitlines.  ¨ Printed self-help materials.  ¨ Support groups or group counseling.  ¨ Text messaging programs.  ¨ Mobile phone applications.  · Taking medicines to help you quit smoking. (If you are pregnant or breastfeeding, talk with your health care provider first.) Some medicines contain nicotine and some do not. Both types of medicines help with cravings, but the medicines that include nicotine help to relieve withdrawal symptoms. Your health care provider may recommend:  ¨ Nicotine patches, gum, or lozenges.  ¨ Nicotine inhalers or sprays.  ¨ Non-nicotine medicine that is taken by mouth.  Talk with your health care provider about combining strategies, such as taking medicines while you are also receiving in-person counseling. Using these two strategies together makes you more likely to succeed in quitting than if you used either strategy on its own.  If you are pregnant or breastfeeding, talk with your health care provider about finding counseling or other support strategies to quit smoking. Do not take medicine to help you quit smoking unless told to do so by your health care provider.  What things can I do to make it easier to quit?  Quitting smoking might feel overwhelming at first, but there is a lot that you can do to make it easier. Take these important actions:  · Reach out to your family and friends and ask that they support and encourage you during this time. Call telephone quitlines, reach out to support groups, or work with a counselor for support.  · Ask people who smoke to avoid smoking around you.  · Avoid places that trigger you to smoke, such as bars, parties, or smoke-break areas at work.  · Spend time around people who do not smoke.  · Lessen stress in your life, because stress can be a smoking trigger for some people. To lessen stress, try:  ¨ Exercising regularly.  ¨ Deep-breathing  exercises.  ¨ Yoga.  ¨ Meditating.  ¨ Performing a body scan. This involves closing your eyes, scanning your body from head to toe, and noticing which parts of your body are particularly tense. Purposefully relax the muscles in those areas.  · Download or purchase mobile phone or tablet apps (applications) that can help you stick to your quit plan by providing reminders, tips, and encouragement. There are many free apps, such as QuitGuide from the CDC (Centers for Disease Control and Prevention). You can find other support for quitting smoking (smoking cessation) through [employer] and other websites.  How will I feel when I quit smoking?  Within the first 24 hours of quitting smoking, you may start to feel some withdrawal symptoms. These symptoms are usually most noticeable 2-3 days after quitting, but they usually do not last beyond 2-3 weeks. Changes or symptoms that you might experience include:  · Mood swings.  · Restlessness, anxiety, or irritation.  · Difficulty concentrating.  · Dizziness.  · Strong cravings for sugary foods in addition to nicotine.  · Mild weight gain.  · Constipation.  · Nausea.  · Coughing or a sore throat.  · Changes in how your medicines work in your body.  · A depressed mood.  · Difficulty sleeping (insomnia).  After the first 2-3 weeks of quitting, you may start to notice more positive results, such as:  · Improved sense of smell and taste.  · Decreased coughing and sore throat.  · Slower heart rate.  · Lower blood pressure.  · Clearer skin.  · The ability to breathe more easily.  · Fewer sick days.  Quitting smoking is very challenging for most people. Do not get discouraged if you are not successful the first time. Some people need to make many attempts to quit before they achieve long-term success. Do your best to stick to your quit plan, and talk with your health care provider if you have any questions or concerns.  This information is not intended to replace advice given to  you by your health care provider. Make sure you discuss any questions you have with your health care provider.  Document Released: 12/12/2002 Document Revised: 08/15/2017 Document Reviewed: 05/03/2016  Elsevier Interactive Patient Education © 2017 [employer] Inc[employer]

## 2022-10-06 ENCOUNTER — APPOINTMENT (OUTPATIENT)
Dept: PREADMISSION TESTING | Facility: HOSPITAL | Age: 77
End: 2022-10-06

## 2022-10-14 ENCOUNTER — APPOINTMENT (OUTPATIENT)
Dept: PREADMISSION TESTING | Facility: HOSPITAL | Age: 77
End: 2022-10-14

## 2022-12-12 ENCOUNTER — TRANSCRIBE ORDERS (OUTPATIENT)
Dept: ADMINISTRATIVE | Facility: HOSPITAL | Age: 77
End: 2022-12-12

## 2022-12-12 ENCOUNTER — LAB (OUTPATIENT)
Dept: LAB | Facility: HOSPITAL | Age: 77
End: 2022-12-12

## 2022-12-12 DIAGNOSIS — I10 ESSENTIAL (PRIMARY) HYPERTENSION: ICD-10-CM

## 2022-12-12 DIAGNOSIS — C22.1 INTRAHEPATIC BILE DUCT CARCINOMA: Primary | ICD-10-CM

## 2022-12-12 LAB
ALBUMIN SERPL-MCNC: 3.8 G/DL (ref 3.5–5)
ALBUMIN/GLOB SERPL: 1.1 G/DL (ref 1.1–2.5)
ALP SERPL-CCNC: 622 U/L (ref 24–120)
ALT SERPL W P-5'-P-CCNC: 58 U/L (ref 0–35)
ANION GAP SERPL CALCULATED.3IONS-SCNC: 10 MMOL/L (ref 4–13)
AST SERPL-CCNC: 123 U/L (ref 7–45)
AUTO MIXED CELLS #: 0.6 10*3/MM3 (ref 0.1–2.6)
AUTO MIXED CELLS %: 11.1 % (ref 0.1–24)
BILIRUB CONJ SERPL-MCNC: 0.3 MG/DL (ref 0–0.3)
BILIRUB SERPL-MCNC: 0.6 MG/DL (ref 0.1–1)
BUN SERPL-MCNC: 7 MG/DL (ref 5–21)
BUN/CREAT SERPL: 13.5
CALCIUM SPEC-SCNC: 8.8 MG/DL (ref 8.4–10.4)
CHLORIDE SERPL-SCNC: 106 MMOL/L (ref 98–110)
CO2 SERPL-SCNC: 27 MMOL/L (ref 24–31)
CREAT SERPL-MCNC: 0.52 MG/DL (ref 0.5–1.4)
EGFRCR SERPLBLD CKD-EPI 2021: 97 ML/MIN/1.73
ERYTHROCYTE [DISTWIDTH] IN BLOOD BY AUTOMATED COUNT: 15.2 % (ref 12.3–15.4)
GLOBULIN UR ELPH-MCNC: 3.4 GM/DL
GLUCOSE SERPL-MCNC: 148 MG/DL (ref 70–100)
HCT VFR BLD AUTO: 36.5 % (ref 34–46.6)
HGB BLD-MCNC: 11.7 G/DL (ref 12–15.9)
LYMPHOCYTES # BLD AUTO: 1.1 10*3/MM3 (ref 0.7–3.1)
LYMPHOCYTES NFR BLD AUTO: 21.2 % (ref 19.6–45.3)
MCH RBC QN AUTO: 29.8 PG (ref 26.6–33)
MCHC RBC AUTO-ENTMCNC: 32.1 G/DL (ref 31.5–35.7)
MCV RBC AUTO: 92.9 FL (ref 79–97)
NEUTROPHILS NFR BLD AUTO: 3.7 10*3/MM3 (ref 1.7–7)
NEUTROPHILS NFR BLD AUTO: 67.7 % (ref 42.7–76)
PLATELET # BLD AUTO: 261 10*3/MM3 (ref 140–450)
PMV BLD AUTO: 10.7 FL (ref 6–12)
POTASSIUM SERPL-SCNC: 4.1 MMOL/L (ref 3.5–5.3)
PROT SERPL-MCNC: 7.2 G/DL (ref 6.3–8.7)
RBC # BLD AUTO: 3.93 10*6/MM3 (ref 3.77–5.28)
SODIUM SERPL-SCNC: 143 MMOL/L (ref 135–145)
WBC NRBC COR # BLD: 5.4 10*3/MM3 (ref 3.4–10.8)

## 2022-12-12 PROCEDURE — 36415 COLL VENOUS BLD VENIPUNCTURE: CPT | Performed by: INTERNAL MEDICINE

## 2022-12-12 PROCEDURE — 85025 COMPLETE CBC W/AUTO DIFF WBC: CPT | Performed by: INTERNAL MEDICINE

## 2022-12-12 PROCEDURE — 80053 COMPREHEN METABOLIC PANEL: CPT | Performed by: INTERNAL MEDICINE

## 2022-12-12 PROCEDURE — 82248 BILIRUBIN DIRECT: CPT | Performed by: INTERNAL MEDICINE

## 2022-12-13 ENCOUNTER — TRANSCRIBE ORDERS (OUTPATIENT)
Dept: ADMINISTRATIVE | Facility: HOSPITAL | Age: 77
End: 2022-12-13

## 2022-12-13 DIAGNOSIS — I10 ESSENTIAL HYPERTENSION: ICD-10-CM

## 2022-12-13 DIAGNOSIS — C22.1 INTRAHEPATIC BILE DUCT CARCINOMA: Primary | ICD-10-CM

## 2023-01-03 ENCOUNTER — TRANSCRIBE ORDERS (OUTPATIENT)
Dept: ADMINISTRATIVE | Facility: HOSPITAL | Age: 78
End: 2023-01-03
Payer: MEDICARE

## 2023-01-03 ENCOUNTER — LAB (OUTPATIENT)
Dept: LAB | Facility: HOSPITAL | Age: 78
End: 2023-01-03
Payer: MEDICARE

## 2023-01-03 DIAGNOSIS — I10 ESSENTIAL (PRIMARY) HYPERTENSION: ICD-10-CM

## 2023-01-03 DIAGNOSIS — C22.1 INTRAHEPATIC BILE DUCT CARCINOMA: Primary | ICD-10-CM

## 2023-01-03 DIAGNOSIS — C22.1 INTRAHEPATIC BILE DUCT CARCINOMA: ICD-10-CM

## 2023-01-03 LAB
ALBUMIN SERPL-MCNC: 3.7 G/DL (ref 3.5–5.2)
ALBUMIN/GLOB SERPL: 1.2 G/DL
ALP SERPL-CCNC: 794 U/L (ref 39–117)
ALT SERPL W P-5'-P-CCNC: 51 U/L (ref 1–33)
ANION GAP SERPL CALCULATED.3IONS-SCNC: 9 MMOL/L (ref 5–15)
AST SERPL-CCNC: 105 U/L (ref 1–32)
AUTO MIXED CELLS #: 0.6 10*3/MM3 (ref 0.1–2.6)
AUTO MIXED CELLS %: 13.1 % (ref 0.1–24)
BILIRUB SERPL-MCNC: 0.5 MG/DL (ref 0–1.2)
BUN SERPL-MCNC: 6 MG/DL (ref 8–23)
BUN/CREAT SERPL: 10.7 (ref 7–25)
CALCIUM SPEC-SCNC: 9 MG/DL (ref 8.6–10.5)
CHLORIDE SERPL-SCNC: 100 MMOL/L (ref 98–107)
CO2 SERPL-SCNC: 26 MMOL/L (ref 22–29)
CREAT SERPL-MCNC: 0.56 MG/DL (ref 0.57–1)
EGFRCR SERPLBLD CKD-EPI 2021: 95.3 ML/MIN/1.73
ERYTHROCYTE [DISTWIDTH] IN BLOOD BY AUTOMATED COUNT: 15 % (ref 12.3–15.4)
GLOBULIN UR ELPH-MCNC: 3.2 GM/DL
GLUCOSE SERPL-MCNC: 180 MG/DL (ref 65–99)
HCT VFR BLD AUTO: 36.3 % (ref 34–46.6)
HGB BLD-MCNC: 11.8 G/DL (ref 12–15.9)
LYMPHOCYTES # BLD AUTO: 1.1 10*3/MM3 (ref 0.7–3.1)
LYMPHOCYTES NFR BLD AUTO: 22.2 % (ref 19.6–45.3)
MCH RBC QN AUTO: 30 PG (ref 26.6–33)
MCHC RBC AUTO-ENTMCNC: 32.5 G/DL (ref 31.5–35.7)
MCV RBC AUTO: 92.4 FL (ref 79–97)
NEUTROPHILS NFR BLD AUTO: 3.2 10*3/MM3 (ref 1.7–7)
NEUTROPHILS NFR BLD AUTO: 64.7 % (ref 42.7–76)
PLATELET # BLD AUTO: 242 10*3/MM3 (ref 140–450)
PMV BLD AUTO: 10.7 FL (ref 6–12)
POTASSIUM SERPL-SCNC: 4.2 MMOL/L (ref 3.5–5.2)
PROT SERPL-MCNC: 6.9 G/DL (ref 6–8.5)
RBC # BLD AUTO: 3.93 10*6/MM3 (ref 3.77–5.28)
SODIUM SERPL-SCNC: 135 MMOL/L (ref 136–145)
WBC NRBC COR # BLD: 4.9 10*3/MM3 (ref 3.4–10.8)

## 2023-01-03 PROCEDURE — 36415 COLL VENOUS BLD VENIPUNCTURE: CPT

## 2023-01-03 PROCEDURE — 80053 COMPREHEN METABOLIC PANEL: CPT

## 2023-01-03 PROCEDURE — 85025 COMPLETE CBC W/AUTO DIFF WBC: CPT

## 2023-01-13 ENCOUNTER — TRANSCRIBE ORDERS (OUTPATIENT)
Dept: ADMINISTRATIVE | Facility: HOSPITAL | Age: 78
End: 2023-01-13
Payer: MEDICARE

## 2023-01-13 DIAGNOSIS — C22.1: Primary | ICD-10-CM

## 2023-01-13 DIAGNOSIS — I10 ESSENTIAL (PRIMARY) HYPERTENSION: ICD-10-CM

## 2023-02-03 ENCOUNTER — LAB (OUTPATIENT)
Dept: LAB | Facility: HOSPITAL | Age: 78
End: 2023-02-03
Payer: MEDICARE

## 2023-02-03 DIAGNOSIS — I10 ESSENTIAL (PRIMARY) HYPERTENSION: ICD-10-CM

## 2023-02-03 DIAGNOSIS — C22.1: ICD-10-CM

## 2023-02-03 LAB
ALBUMIN SERPL-MCNC: 4.1 G/DL (ref 3.5–5)
ALBUMIN/GLOB SERPL: 1.1 G/DL (ref 1.1–2.5)
ALP SERPL-CCNC: 559 U/L (ref 24–120)
ALT SERPL W P-5'-P-CCNC: 65 U/L (ref 0–35)
ANION GAP SERPL CALCULATED.3IONS-SCNC: 4 MMOL/L (ref 4–13)
AST SERPL-CCNC: 90 U/L (ref 7–45)
AUTO MIXED CELLS #: 1 10*3/MM3 (ref 0.1–2.6)
AUTO MIXED CELLS %: 13.4 % (ref 0.1–24)
BILIRUB SERPL-MCNC: 0.7 MG/DL (ref 0.1–1)
BUN SERPL-MCNC: 13 MG/DL (ref 5–21)
BUN/CREAT SERPL: 20
CALCIUM SPEC-SCNC: 9.1 MG/DL (ref 8.4–10.4)
CHLORIDE SERPL-SCNC: 102 MMOL/L (ref 98–110)
CO2 SERPL-SCNC: 28 MMOL/L (ref 24–31)
CREAT SERPL-MCNC: 0.65 MG/DL (ref 0.5–1.4)
EGFRCR SERPLBLD CKD-EPI 2021: 91.9 ML/MIN/1.73
ERYTHROCYTE [DISTWIDTH] IN BLOOD BY AUTOMATED COUNT: 15.5 % (ref 12.3–15.4)
GLOBULIN UR ELPH-MCNC: 3.6 GM/DL
GLUCOSE SERPL-MCNC: 70 MG/DL (ref 70–100)
HCT VFR BLD AUTO: 39 % (ref 34–46.6)
HGB BLD-MCNC: 12.8 G/DL (ref 12–15.9)
LYMPHOCYTES # BLD AUTO: 2.2 10*3/MM3 (ref 0.7–3.1)
LYMPHOCYTES NFR BLD AUTO: 29.9 % (ref 19.6–45.3)
MCH RBC QN AUTO: 30.2 PG (ref 26.6–33)
MCHC RBC AUTO-ENTMCNC: 32.8 G/DL (ref 31.5–35.7)
MCV RBC AUTO: 92 FL (ref 79–97)
NEUTROPHILS NFR BLD AUTO: 4.3 10*3/MM3 (ref 1.7–7)
NEUTROPHILS NFR BLD AUTO: 56.7 % (ref 42.7–76)
PLATELET # BLD AUTO: 193 10*3/MM3 (ref 140–450)
PMV BLD AUTO: 11.2 FL (ref 6–12)
POTASSIUM SERPL-SCNC: 4.2 MMOL/L (ref 3.5–5.3)
PROT SERPL-MCNC: 7.7 G/DL (ref 6.3–8.7)
RBC # BLD AUTO: 4.24 10*6/MM3 (ref 3.77–5.28)
SODIUM SERPL-SCNC: 134 MMOL/L (ref 135–145)
WBC NRBC COR # BLD: 7.5 10*3/MM3 (ref 3.4–10.8)

## 2023-02-03 PROCEDURE — 85025 COMPLETE CBC W/AUTO DIFF WBC: CPT

## 2023-02-03 PROCEDURE — 80053 COMPREHEN METABOLIC PANEL: CPT

## 2023-02-03 PROCEDURE — 36415 COLL VENOUS BLD VENIPUNCTURE: CPT

## 2023-03-10 ENCOUNTER — TRANSCRIBE ORDERS (OUTPATIENT)
Dept: ADMINISTRATIVE | Facility: HOSPITAL | Age: 78
End: 2023-03-10
Payer: MEDICARE

## 2023-03-10 DIAGNOSIS — R53.81 OTHER MALAISE: ICD-10-CM

## 2023-03-10 DIAGNOSIS — R94.5 ABNORMAL RESULTS OF LIVER FUNCTION STUDIES: Primary | ICD-10-CM

## 2023-03-23 ENCOUNTER — LAB (OUTPATIENT)
Dept: LAB | Facility: HOSPITAL | Age: 78
End: 2023-03-23
Payer: MEDICARE

## 2023-03-23 DIAGNOSIS — R53.81 OTHER MALAISE: ICD-10-CM

## 2023-03-23 DIAGNOSIS — R94.5 ABNORMAL RESULTS OF LIVER FUNCTION STUDIES: ICD-10-CM

## 2023-03-23 LAB
ALBUMIN SERPL-MCNC: 4 G/DL (ref 3.5–5)
ALBUMIN/GLOB SERPL: 1.1 G/DL (ref 1.1–2.5)
ALP SERPL-CCNC: 621 U/L (ref 24–120)
ALT SERPL W P-5'-P-CCNC: 70 U/L (ref 0–35)
ANION GAP SERPL CALCULATED.3IONS-SCNC: 5 MMOL/L (ref 4–13)
AST SERPL-CCNC: 109 U/L (ref 7–45)
AUTO MIXED CELLS #: 0.7 10*3/MM3 (ref 0.1–2.6)
AUTO MIXED CELLS %: 11.7 % (ref 0.1–24)
BILIRUB CONJ SERPL-MCNC: 0.3 MG/DL (ref 0–0.3)
BILIRUB SERPL-MCNC: 0.9 MG/DL (ref 0.1–1)
BUN SERPL-MCNC: 11 MG/DL (ref 5–21)
BUN/CREAT SERPL: 19.6
CALCIUM SPEC-SCNC: 8.8 MG/DL (ref 8.4–10.4)
CHLORIDE SERPL-SCNC: 103 MMOL/L (ref 98–110)
CO2 SERPL-SCNC: 29 MMOL/L (ref 24–31)
CREAT SERPL-MCNC: 0.56 MG/DL (ref 0.5–1.4)
EGFRCR SERPLBLD CKD-EPI 2021: 95.3 ML/MIN/1.73
ERYTHROCYTE [DISTWIDTH] IN BLOOD BY AUTOMATED COUNT: 15.8 % (ref 12.3–15.4)
GLOBULIN UR ELPH-MCNC: 3.5 GM/DL
GLUCOSE SERPL-MCNC: 134 MG/DL (ref 70–100)
HCT VFR BLD AUTO: 37.6 % (ref 34–46.6)
HGB BLD-MCNC: 12.4 G/DL (ref 12–15.9)
LYMPHOCYTES # BLD AUTO: 1.3 10*3/MM3 (ref 0.7–3.1)
LYMPHOCYTES NFR BLD AUTO: 21.6 % (ref 19.6–45.3)
MCH RBC QN AUTO: 30.6 PG (ref 26.6–33)
MCHC RBC AUTO-ENTMCNC: 33 G/DL (ref 31.5–35.7)
MCV RBC AUTO: 92.8 FL (ref 79–97)
NEUTROPHILS NFR BLD AUTO: 3.8 10*3/MM3 (ref 1.7–7)
NEUTROPHILS NFR BLD AUTO: 66.7 % (ref 42.7–76)
PLATELET # BLD AUTO: 201 10*3/MM3 (ref 140–450)
PMV BLD AUTO: 10.7 FL (ref 6–12)
POTASSIUM SERPL-SCNC: 4 MMOL/L (ref 3.5–5.3)
PROT SERPL-MCNC: 7.5 G/DL (ref 6.3–8.7)
RBC # BLD AUTO: 4.05 10*6/MM3 (ref 3.77–5.28)
SODIUM SERPL-SCNC: 137 MMOL/L (ref 135–145)
WBC NRBC COR # BLD: 5.8 10*3/MM3 (ref 3.4–10.8)

## 2023-03-23 PROCEDURE — 36415 COLL VENOUS BLD VENIPUNCTURE: CPT

## 2023-03-23 PROCEDURE — 80053 COMPREHEN METABOLIC PANEL: CPT | Performed by: INTERNAL MEDICINE

## 2023-03-23 PROCEDURE — 85025 COMPLETE CBC W/AUTO DIFF WBC: CPT

## 2023-03-23 PROCEDURE — 82248 BILIRUBIN DIRECT: CPT

## 2023-04-18 ENCOUNTER — TRANSCRIBE ORDERS (OUTPATIENT)
Dept: ADMINISTRATIVE | Facility: HOSPITAL | Age: 78
End: 2023-04-18
Payer: MEDICARE

## 2023-04-18 DIAGNOSIS — R59.0 LOCALIZED ENLARGED LYMPH NODES: Primary | ICD-10-CM

## 2023-04-21 ENCOUNTER — HOSPITAL ENCOUNTER (OUTPATIENT)
Dept: ULTRASOUND IMAGING | Facility: HOSPITAL | Age: 78
Discharge: HOME | End: 2023-04-21
Payer: MEDICARE

## 2023-04-21 DIAGNOSIS — R59.0 LOCALIZED ENLARGED LYMPH NODES: ICD-10-CM

## 2023-04-21 PROCEDURE — 76999 ECHO EXAMINATION PROCEDURE: CPT

## 2023-04-26 ENCOUNTER — OFFICE VISIT (OUTPATIENT)
Dept: SURGERY | Facility: CLINIC | Age: 78
End: 2023-04-26
Payer: MEDICARE

## 2023-04-26 VITALS
SYSTOLIC BLOOD PRESSURE: 120 MMHG | HEART RATE: 71 BPM | BODY MASS INDEX: 20.41 KG/M2 | HEIGHT: 62 IN | OXYGEN SATURATION: 97 % | WEIGHT: 110.89 LBS | DIASTOLIC BLOOD PRESSURE: 68 MMHG

## 2023-04-26 DIAGNOSIS — K40.90 NON-RECURRENT UNILATERAL INGUINAL HERNIA WITHOUT OBSTRUCTION OR GANGRENE: Primary | ICD-10-CM

## 2023-04-26 RX ORDER — MELOXICAM 7.5 MG/1
TABLET ORAL
COMMUNITY
Start: 2023-02-20

## 2023-04-26 NOTE — PROGRESS NOTES
Arline Pathak MD [facility] History and Physical     Referring Provider: Jae Olivier MD      Chief complaint   Chief Complaint   Patient presents with   • Mass     Mrs. Kingsley is here for a consult for swollen lymph nodes.         Subjective .     History of present illness:  Candice Kingsley is a 77 y.o. female who presents complaining of a mass at the left groin.  She denies any associated pain or recent trauma.  She does have a history of cholangiocarcinoma.  She is status post Whipple procedure in 2011.  She was concerned that this mass might be an enlarged lymph node.  Ultrasound demonstrated a fatty containing left inguinal hernia.  There were no suspicious inguinal lymph nodes.      History    Past Medical History:   Diagnosis Date   • Asthma    • Cancer     Cholangiocarcinoma   • Hypertension    • Migraines    ,   Past Surgical History:   Procedure Laterality Date   • CHOLECYSTECTOMY     • COLONOSCOPY  05/22/2013    Diverticulosis, Hemorrhoids repeat exam in 5 years   • COLONOSCOPY N/A 9/28/2021    Tics otherwise normal exam repeat in 7 years   • COLONOSCOPY W/ POLYPECTOMY  07/26/2010    Hyperplastic polyp cecum, pan-diverticular disease repeat exam in 3 years   • COMMON BILE DUCT EXPLORATION      For carcinoma of the bile duct   • ENDOSCOPY  02/01/2016    Normal exam   • WHIPPLE PROCEDURE  2011   ,   Family History   Problem Relation Age of Onset   • No Known Problems Mother    • No Known Problems Father    • Colon cancer Neg Hx    • Colon polyps Neg Hx    ,   Social History     Tobacco Use   • Smoking status: Former   • Smokeless tobacco: Never   Substance Use Topics   • Alcohol use: Yes     Comment: Occasional   , (Not in a hospital admission)   and Allergies:  Cefdinir, Cefuroxime, Sulfa antibiotics, and Cephalosporins    Current Outpatient Medications:   •  butalbital-acetaminophen-caffeine (FIORICET, ESGIC) -40 MG per tablet, Take 1 tablet by mouth 2 (Two) Times a Day., Disp: , Rfl: 0  •   "cyanocobalamin 1000 MCG/ML injection, INJECT 1 ML ONCE A MONTH, Disp: , Rfl: 2  •  diphenoxylate-atropine (LOMOTIL) 2.5-0.025 MG per tablet, Take 1 tablet by mouth 4 (Four) Times a Day As Needed for Diarrhea., Disp: , Rfl:   •  lisinopril (PRINIVIL,ZESTRIL) 10 MG tablet, , Disp: , Rfl:   •  LORazepam (ATIVAN) 1 MG tablet, Take 1 tablet by mouth Every 8 (Eight) Hours As Needed., Disp: , Rfl:   •  meloxicam (MOBIC) 7.5 MG tablet, , Disp: , Rfl:   •  promethazine (PHENERGAN) 25 MG tablet, Take 1 tablet by mouth Every 6 (Six) Hours As Needed for Nausea or Vomiting., Disp: , Rfl:     Review of Systems:    All organ systems were evaluated and found negative except those which are mentioned in the History of Present Illness.    Objective     Physical Exam:    Vital Signs   /68 (BP Location: Left arm, Patient Position: Sitting, Cuff Size: Adult)   Pulse 71   Ht 157.5 cm (62.01\")   Wt 50.3 kg (110 lb 14.3 oz)   SpO2 97%   BMI 20.28 kg/m²        Constitutional:    Well-developed, well-nourished in no acute distress  Eyes:     Extraocular movements intact; pupils equal, round, and reactive  Ears, Nose, Mouth, Throat:  Hearing intact, nose midline, no mucosal lesions  Cardiovascular:   Regular rate and rhythm   Respiratory:    Clear to auscultation bilaterally  Gastrointestinal:   Soft, nontender, nondistended, small easily reducible left inguinal hernia  Genitourinary:    Deferred  Musculoskeletal:   Full range of motion, no muscle wasting, no weakness  Skin:     No rashes or excoriations  Neurological:    Moves all extremities, sensation intact  Psychiatric:    Alert and oriented to person, place, and time  Hematologic/Lymphatic/Immune: No lymphadenopathy       Results Review:    BMI is within normal parameters. No other follow-up for BMI required.    Assessment & Plan       Diagnoses and all orders for this visit:    1. Non-recurrent unilateral inguinal hernia without obstruction or gangrene (Primary)     "       Open inguinal hernia repair with possible placement of mesh was discussed with the patient.  She denies any pain or associated GI symptoms.  She states that she is just excited that it is a hernia and not recurrent cancer.  She is not interested in repair of the hernia at this time.  She will return for re-evaluation with pain, increase in size, or onset of associated GI symptoms.  .    An extensive review of patient intake forms, referring physician documents, laboratories, and imaging was performed in the medical decision making and surgical planning of this patient.            Arline Pathak MD

## 2023-07-31 ENCOUNTER — TRANSCRIBE ORDERS (OUTPATIENT)
Dept: ADMINISTRATIVE | Facility: HOSPITAL | Age: 78
End: 2023-07-31
Payer: MEDICARE

## 2023-07-31 ENCOUNTER — LAB (OUTPATIENT)
Dept: LAB | Facility: HOSPITAL | Age: 78
End: 2023-07-31
Payer: MEDICARE

## 2023-07-31 DIAGNOSIS — R94.5 ABNORMAL RESULTS OF LIVER FUNCTION STUDIES: Primary | ICD-10-CM

## 2023-07-31 DIAGNOSIS — R53.81 OTHER MALAISE: ICD-10-CM

## 2023-07-31 DIAGNOSIS — R94.5 ABNORMAL RESULTS OF LIVER FUNCTION STUDIES: ICD-10-CM

## 2023-07-31 LAB
ALBUMIN SERPL-MCNC: 3.3 G/DL (ref 3.5–5)
ALBUMIN/GLOB SERPL: 0.9 G/DL (ref 1.1–2.5)
ALP SERPL-CCNC: 394 U/L (ref 24–120)
ALT SERPL W P-5'-P-CCNC: 48 U/L (ref 0–35)
ANION GAP SERPL CALCULATED.3IONS-SCNC: 8 MMOL/L (ref 4–13)
AST SERPL-CCNC: 62 U/L (ref 7–45)
AUTO MIXED CELLS #: 1.4 10*3/MM3 (ref 0.1–2.6)
AUTO MIXED CELLS %: 9.9 % (ref 0.1–24)
BILIRUB CONJ SERPL-MCNC: 0.9 MG/DL (ref 0–0.3)
BILIRUB SERPL-MCNC: 1.4 MG/DL (ref 0.1–1)
BUN SERPL-MCNC: 13 MG/DL (ref 5–21)
BUN/CREAT SERPL: 23.6
CALCIUM SPEC-SCNC: 8.6 MG/DL (ref 8.4–10.4)
CHLORIDE SERPL-SCNC: 99 MMOL/L (ref 98–110)
CO2 SERPL-SCNC: 26 MMOL/L (ref 24–31)
CREAT SERPL-MCNC: 0.55 MG/DL (ref 0.5–1.4)
EGFRCR SERPLBLD CKD-EPI 2021: 95.7 ML/MIN/1.73
ERYTHROCYTE [DISTWIDTH] IN BLOOD BY AUTOMATED COUNT: 15.8 % (ref 12.3–15.4)
GLOBULIN UR ELPH-MCNC: 3.7 GM/DL
GLUCOSE SERPL-MCNC: 90 MG/DL (ref 70–100)
HCT VFR BLD AUTO: 37.2 % (ref 34–46.6)
HGB BLD-MCNC: 12.4 G/DL (ref 12–15.9)
LYMPHOCYTES # BLD AUTO: 0.9 10*3/MM3 (ref 0.7–3.1)
LYMPHOCYTES NFR BLD AUTO: 6.1 % (ref 19.6–45.3)
MCH RBC QN AUTO: 30.8 PG (ref 26.6–33)
MCHC RBC AUTO-ENTMCNC: 33.3 G/DL (ref 31.5–35.7)
MCV RBC AUTO: 92.3 FL (ref 79–97)
NEUTROPHILS NFR BLD AUTO: 11.9 10*3/MM3 (ref 1.7–7)
NEUTROPHILS NFR BLD AUTO: 84 % (ref 42.7–76)
PLATELET # BLD AUTO: 98 10*3/MM3 (ref 140–450)
PMV BLD AUTO: 11 FL (ref 6–12)
POTASSIUM SERPL-SCNC: 3.5 MMOL/L (ref 3.5–5.3)
PROT SERPL-MCNC: 7 G/DL (ref 6.3–8.7)
RBC # BLD AUTO: 4.03 10*6/MM3 (ref 3.77–5.28)
SODIUM SERPL-SCNC: 133 MMOL/L (ref 135–145)
WBC NRBC COR # BLD: 14.2 10*3/MM3 (ref 3.4–10.8)

## 2023-07-31 PROCEDURE — 85025 COMPLETE CBC W/AUTO DIFF WBC: CPT

## 2023-07-31 PROCEDURE — 80053 COMPREHEN METABOLIC PANEL: CPT

## 2023-07-31 PROCEDURE — 36415 COLL VENOUS BLD VENIPUNCTURE: CPT

## 2023-07-31 PROCEDURE — 82248 BILIRUBIN DIRECT: CPT

## 2023-08-16 ENCOUNTER — LAB (OUTPATIENT)
Dept: LAB | Facility: HOSPITAL | Age: 78
End: 2023-08-16
Payer: MEDICARE

## 2023-08-16 ENCOUNTER — TRANSCRIBE ORDERS (OUTPATIENT)
Dept: ADMINISTRATIVE | Facility: HOSPITAL | Age: 78
End: 2023-08-16
Payer: MEDICARE

## 2023-08-16 DIAGNOSIS — R94.5 ABNORMAL RESULTS OF LIVER FUNCTION STUDIES: ICD-10-CM

## 2023-08-16 DIAGNOSIS — D64.9 ANEMIA, UNSPECIFIED TYPE: ICD-10-CM

## 2023-08-16 DIAGNOSIS — D64.9 ANEMIA, UNSPECIFIED TYPE: Primary | ICD-10-CM

## 2023-08-16 LAB
ALBUMIN SERPL-MCNC: 3.5 G/DL (ref 3.5–5)
ALBUMIN/GLOB SERPL: 0.9 G/DL (ref 1.1–2.5)
ALP SERPL-CCNC: 601 U/L (ref 24–120)
ALT SERPL W P-5'-P-CCNC: 42 U/L (ref 0–35)
ANION GAP SERPL CALCULATED.3IONS-SCNC: 7 MMOL/L (ref 4–13)
AST SERPL-CCNC: 110 U/L (ref 7–45)
AUTO MIXED CELLS #: 1 10*3/MM3 (ref 0.1–2.6)
AUTO MIXED CELLS %: 14.6 % (ref 0.1–24)
BILIRUB SERPL-MCNC: 1 MG/DL (ref 0.1–1)
BUN SERPL-MCNC: 8 MG/DL (ref 5–21)
BUN/CREAT SERPL: 13.8
CALCIUM SPEC-SCNC: 8.6 MG/DL (ref 8.4–10.4)
CHLORIDE SERPL-SCNC: 100 MMOL/L (ref 98–110)
CO2 SERPL-SCNC: 27 MMOL/L (ref 24–31)
CREAT SERPL-MCNC: 0.58 MG/DL (ref 0.5–1.4)
EGFRCR SERPLBLD CKD-EPI 2021: 94.5 ML/MIN/1.73
ERYTHROCYTE [DISTWIDTH] IN BLOOD BY AUTOMATED COUNT: 16.3 % (ref 12.3–15.4)
GLOBULIN UR ELPH-MCNC: 4.1 GM/DL
GLUCOSE SERPL-MCNC: 82 MG/DL (ref 70–100)
HCT VFR BLD AUTO: 37.2 % (ref 34–46.6)
HGB BLD-MCNC: 12.1 G/DL (ref 12–15.9)
LYMPHOCYTES # BLD AUTO: 1.7 10*3/MM3 (ref 0.7–3.1)
LYMPHOCYTES NFR BLD AUTO: 24.3 % (ref 19.6–45.3)
MCH RBC QN AUTO: 30.6 PG (ref 26.6–33)
MCHC RBC AUTO-ENTMCNC: 32.5 G/DL (ref 31.5–35.7)
MCV RBC AUTO: 94.2 FL (ref 79–97)
NEUTROPHILS NFR BLD AUTO: 4.1 10*3/MM3 (ref 1.7–7)
NEUTROPHILS NFR BLD AUTO: 61.1 % (ref 42.7–76)
PLATELET # BLD AUTO: 376 10*3/MM3 (ref 140–450)
PMV BLD AUTO: 9.2 FL (ref 6–12)
POTASSIUM SERPL-SCNC: 4.1 MMOL/L (ref 3.5–5.3)
PROT SERPL-MCNC: 7.6 G/DL (ref 6.3–8.7)
RBC # BLD AUTO: 3.95 10*6/MM3 (ref 3.77–5.28)
SODIUM SERPL-SCNC: 134 MMOL/L (ref 135–145)
WBC NRBC COR # BLD: 6.8 10*3/MM3 (ref 3.4–10.8)

## 2023-08-16 PROCEDURE — 36415 COLL VENOUS BLD VENIPUNCTURE: CPT

## 2023-08-16 PROCEDURE — 80053 COMPREHEN METABOLIC PANEL: CPT

## 2023-08-16 PROCEDURE — 85025 COMPLETE CBC W/AUTO DIFF WBC: CPT

## 2023-08-18 ENCOUNTER — TRANSCRIBE ORDERS (OUTPATIENT)
Dept: ADMINISTRATIVE | Facility: HOSPITAL | Age: 78
End: 2023-08-18
Payer: MEDICARE

## 2023-08-18 DIAGNOSIS — R94.5 ABNORMAL RESULTS OF LIVER FUNCTION STUDIES: Primary | ICD-10-CM

## 2023-08-23 ENCOUNTER — LAB (OUTPATIENT)
Dept: LAB | Facility: HOSPITAL | Age: 78
End: 2023-08-23
Payer: MEDICARE

## 2023-08-23 DIAGNOSIS — C22.1 INTRAHEPATIC BILE DUCT CARCINOMA: ICD-10-CM

## 2023-08-23 DIAGNOSIS — I10 ESSENTIAL HYPERTENSION: ICD-10-CM

## 2023-08-23 LAB
ALBUMIN SERPL-MCNC: 3.5 G/DL (ref 3.5–5)
ALBUMIN/GLOB SERPL: 0.9 G/DL (ref 1.1–2.5)
ALP SERPL-CCNC: 578 U/L (ref 24–120)
ALT SERPL W P-5'-P-CCNC: 57 U/L (ref 0–35)
ANION GAP SERPL CALCULATED.3IONS-SCNC: 4 MMOL/L (ref 4–13)
AST SERPL-CCNC: 128 U/L (ref 7–45)
BILIRUB SERPL-MCNC: 1.1 MG/DL (ref 0.1–1)
BUN SERPL-MCNC: 8 MG/DL (ref 5–21)
BUN/CREAT SERPL: 18.2
CALCIUM SPEC-SCNC: 8.4 MG/DL (ref 8.4–10.4)
CHLORIDE SERPL-SCNC: 99 MMOL/L (ref 98–110)
CO2 SERPL-SCNC: 28 MMOL/L (ref 24–31)
CREAT SERPL-MCNC: 0.44 MG/DL (ref 0.5–1.4)
EGFRCR SERPLBLD CKD-EPI 2021: 101 ML/MIN/1.73
GLOBULIN UR ELPH-MCNC: 3.8 GM/DL
GLUCOSE SERPL-MCNC: 97 MG/DL (ref 70–100)
POTASSIUM SERPL-SCNC: 4.4 MMOL/L (ref 3.5–5.3)
PROT SERPL-MCNC: 7.3 G/DL (ref 6.3–8.7)
SODIUM SERPL-SCNC: 131 MMOL/L (ref 135–145)

## 2023-08-23 PROCEDURE — 36415 COLL VENOUS BLD VENIPUNCTURE: CPT | Performed by: INTERNAL MEDICINE

## 2023-08-23 PROCEDURE — 80053 COMPREHEN METABOLIC PANEL: CPT | Performed by: INTERNAL MEDICINE

## 2023-10-27 ENCOUNTER — TRANSCRIBE ORDERS (OUTPATIENT)
Dept: ADMINISTRATIVE | Facility: HOSPITAL | Age: 78
End: 2023-10-27
Payer: MEDICARE

## 2023-10-27 ENCOUNTER — LAB (OUTPATIENT)
Dept: LAB | Facility: HOSPITAL | Age: 78
End: 2023-10-27
Payer: MEDICARE

## 2023-10-27 DIAGNOSIS — R94.5 ABNORMAL RESULTS OF LIVER FUNCTION STUDIES: ICD-10-CM

## 2023-10-27 DIAGNOSIS — D64.9 ANEMIA, UNSPECIFIED TYPE: Primary | ICD-10-CM

## 2023-10-27 DIAGNOSIS — D64.9 ANEMIA, UNSPECIFIED TYPE: ICD-10-CM

## 2023-10-27 LAB
ALBUMIN SERPL-MCNC: 3.4 G/DL (ref 3.5–5)
ALBUMIN/GLOB SERPL: 1 G/DL (ref 1.1–2.5)
ALP SERPL-CCNC: 519 U/L (ref 24–120)
ALT SERPL W P-5'-P-CCNC: 55 U/L (ref 0–35)
ANION GAP SERPL CALCULATED.3IONS-SCNC: 5 MMOL/L (ref 4–13)
AST SERPL-CCNC: 105 U/L (ref 7–45)
AUTO MIXED CELLS #: 0.6 10*3/MM3 (ref 0.1–2.6)
AUTO MIXED CELLS %: 10.9 % (ref 0.1–24)
BILIRUB SERPL-MCNC: 1.2 MG/DL (ref 0.1–1)
BUN SERPL-MCNC: 8 MG/DL (ref 5–21)
BUN/CREAT SERPL: 17
CALCIUM SPEC-SCNC: 8.8 MG/DL (ref 8.4–10.4)
CHLORIDE SERPL-SCNC: 101 MMOL/L (ref 98–110)
CO2 SERPL-SCNC: 27 MMOL/L (ref 24–31)
CREAT SERPL-MCNC: 0.47 MG/DL (ref 0.5–1.4)
EGFRCR SERPLBLD CKD-EPI 2021: 99.4 ML/MIN/1.73
ERYTHROCYTE [DISTWIDTH] IN BLOOD BY AUTOMATED COUNT: 15 % (ref 12.3–15.4)
GLOBULIN UR ELPH-MCNC: 3.5 GM/DL
GLUCOSE SERPL-MCNC: 118 MG/DL (ref 70–100)
HCT VFR BLD AUTO: 37.3 % (ref 34–46.6)
HGB BLD-MCNC: 12.4 G/DL (ref 12–15.9)
LYMPHOCYTES # BLD AUTO: 1.1 10*3/MM3 (ref 0.7–3.1)
LYMPHOCYTES NFR BLD AUTO: 19.3 % (ref 19.6–45.3)
MCH RBC QN AUTO: 31.5 PG (ref 26.6–33)
MCHC RBC AUTO-ENTMCNC: 33.2 G/DL (ref 31.5–35.7)
MCV RBC AUTO: 94.7 FL (ref 79–97)
NEUTROPHILS NFR BLD AUTO: 3.9 10*3/MM3 (ref 1.7–7)
NEUTROPHILS NFR BLD AUTO: 69.8 % (ref 42.7–76)
PLATELET # BLD AUTO: 194 10*3/MM3 (ref 140–450)
PMV BLD AUTO: 10.3 FL (ref 6–12)
POTASSIUM SERPL-SCNC: 4.7 MMOL/L (ref 3.5–5.3)
PROT SERPL-MCNC: 6.9 G/DL (ref 6.3–8.7)
RBC # BLD AUTO: 3.94 10*6/MM3 (ref 3.77–5.28)
SODIUM SERPL-SCNC: 133 MMOL/L (ref 135–145)
WBC NRBC COR # BLD: 5.6 10*3/MM3 (ref 3.4–10.8)

## 2023-10-27 PROCEDURE — 36415 COLL VENOUS BLD VENIPUNCTURE: CPT | Performed by: INTERNAL MEDICINE

## 2023-10-27 PROCEDURE — 85025 COMPLETE CBC W/AUTO DIFF WBC: CPT

## 2023-10-27 PROCEDURE — 80053 COMPREHEN METABOLIC PANEL: CPT | Performed by: INTERNAL MEDICINE

## 2023-10-31 ENCOUNTER — TRANSCRIBE ORDERS (OUTPATIENT)
Dept: ADMINISTRATIVE | Facility: HOSPITAL | Age: 78
End: 2023-10-31
Payer: MEDICARE

## 2023-10-31 DIAGNOSIS — Z12.31 ENCOUNTER FOR SCREENING MAMMOGRAM FOR MALIGNANT NEOPLASM OF BREAST: Primary | ICD-10-CM

## 2023-11-20 ENCOUNTER — APPOINTMENT (OUTPATIENT)
Dept: OTHER | Facility: HOSPITAL | Age: 78
End: 2023-11-20
Payer: MEDICARE

## 2023-11-20 ENCOUNTER — HOSPITAL ENCOUNTER (OUTPATIENT)
Dept: MAMMOGRAPHY | Facility: HOSPITAL | Age: 78
Discharge: HOME | End: 2023-11-20
Admitting: INTERNAL MEDICINE
Payer: MEDICARE

## 2023-11-20 DIAGNOSIS — Z12.31 ENCOUNTER FOR SCREENING MAMMOGRAM FOR MALIGNANT NEOPLASM OF BREAST: ICD-10-CM

## 2023-11-20 PROCEDURE — 77063 BREAST TOMOSYNTHESIS BI: CPT

## 2023-11-20 PROCEDURE — 77067 SCR MAMMO BI INCL CAD: CPT

## 2023-12-01 ENCOUNTER — TRANSCRIBE ORDERS (OUTPATIENT)
Dept: ADMINISTRATIVE | Facility: HOSPITAL | Age: 78
End: 2023-12-01
Payer: MEDICARE

## 2023-12-01 DIAGNOSIS — C22.1 INTRAHEPATIC BILE DUCT CARCINOMA: Primary | ICD-10-CM

## 2023-12-01 DIAGNOSIS — R14.0 ABDOMINAL DISTENTION: ICD-10-CM

## 2023-12-13 ENCOUNTER — HOSPITAL ENCOUNTER (OUTPATIENT)
Dept: CT IMAGING | Facility: HOSPITAL | Age: 78
Discharge: HOME | End: 2023-12-13
Admitting: INTERNAL MEDICINE
Payer: MEDICARE

## 2023-12-13 DIAGNOSIS — C22.1 INTRAHEPATIC BILE DUCT CARCINOMA: ICD-10-CM

## 2023-12-13 DIAGNOSIS — R14.0 ABDOMINAL DISTENTION: ICD-10-CM

## 2023-12-13 LAB — CREAT BLDA-MCNC: 0.6 MG/DL (ref 0.6–1.3)

## 2023-12-13 PROCEDURE — 25510000001 IOPAMIDOL 61 % SOLUTION: Performed by: INTERNAL MEDICINE

## 2023-12-13 PROCEDURE — 82565 ASSAY OF CREATININE: CPT

## 2023-12-13 PROCEDURE — 74177 CT ABD & PELVIS W/CONTRAST: CPT

## 2023-12-13 RX ADMIN — IOPAMIDOL 100 ML: 612 INJECTION, SOLUTION INTRAVENOUS at 08:03

## 2023-12-14 ENCOUNTER — TELEPHONE (OUTPATIENT)
Dept: GASTROENTEROLOGY | Facility: CLINIC | Age: 78
End: 2023-12-14
Payer: MEDICARE

## 2023-12-15 RX ORDER — GABAPENTIN 600 MG/1
600 TABLET ORAL 3 TIMES DAILY
COMMUNITY

## 2023-12-15 RX ORDER — MONTELUKAST SODIUM 10 MG/1
10 TABLET ORAL NIGHTLY
COMMUNITY

## 2023-12-15 RX ORDER — FAMOTIDINE 20 MG/1
20 TABLET, FILM COATED ORAL 2 TIMES DAILY
COMMUNITY

## 2023-12-15 RX ORDER — MONTELUKAST SODIUM 4 MG/1
1 TABLET, CHEWABLE ORAL 2 TIMES DAILY
COMMUNITY

## 2023-12-15 RX ORDER — ZOLPIDEM TARTRATE 10 MG/1
10 TABLET ORAL NIGHTLY PRN
COMMUNITY

## 2023-12-15 RX ORDER — MECLIZINE HYDROCHLORIDE 25 MG/1
25 TABLET ORAL 3 TIMES DAILY PRN
COMMUNITY

## 2023-12-15 RX ORDER — ESCITALOPRAM OXALATE 10 MG/1
10 TABLET ORAL DAILY
COMMUNITY

## 2023-12-15 RX ORDER — BUTALBITAL, ACETAMINOPHEN AND CAFFEINE 50; 325; 40 MG/1; MG/1; MG/1
1 TABLET ORAL EVERY 4 HOURS PRN
COMMUNITY

## 2023-12-15 RX ORDER — ALBUTEROL SULFATE 90 UG/1
2 AEROSOL, METERED RESPIRATORY (INHALATION) EVERY 6 HOURS PRN
COMMUNITY

## 2023-12-15 RX ORDER — ERGOCALCIFEROL 1.25 MG/1
50000 CAPSULE ORAL WEEKLY
COMMUNITY

## 2023-12-15 RX ORDER — SUMATRIPTAN 20 MG/1
1 SPRAY NASAL DAILY PRN
COMMUNITY

## 2023-12-15 RX ORDER — PROMETHAZINE HYDROCHLORIDE 25 MG/1
25 TABLET ORAL EVERY 6 HOURS PRN
COMMUNITY

## 2023-12-15 RX ORDER — LISINOPRIL 5 MG/1
5 TABLET ORAL DAILY
COMMUNITY

## 2023-12-15 RX ORDER — CYANOCOBALAMIN 1000 UG/ML
1000 INJECTION, SOLUTION INTRAMUSCULAR; SUBCUTANEOUS
COMMUNITY
Start: 2018-10-11

## 2023-12-15 RX ORDER — OXYCODONE AND ACETAMINOPHEN 10; 325 MG/1; MG/1
1 TABLET ORAL EVERY 4 HOURS PRN
COMMUNITY

## 2023-12-15 RX ORDER — LORAZEPAM 1 MG/1
1 TABLET ORAL EVERY 8 HOURS PRN
COMMUNITY
Start: 2018-10-18

## 2023-12-15 RX ORDER — ALBUTEROL SULFATE 90 UG/1
INHALANT RESPIRATORY (INHALATION)
COMMUNITY

## 2023-12-15 RX ORDER — CHOLESTYRAMINE 4 G/9G
1 POWDER, FOR SUSPENSION ORAL
COMMUNITY

## 2023-12-15 RX ORDER — DIPHENOXYLATE HYDROCHLORIDE AND ATROPINE SULFATE 2.5; .025 MG/1; MG/1
1 TABLET ORAL 4 TIMES DAILY PRN
COMMUNITY

## 2023-12-15 RX ORDER — MELOXICAM 7.5 MG/1
7.5 TABLET ORAL DAILY
COMMUNITY

## 2024-01-04 ENCOUNTER — HOSPITAL ENCOUNTER (OUTPATIENT)
Dept: INFUSION THERAPY | Age: 79
Discharge: HOME | End: 2024-01-04
Payer: MEDICARE

## 2024-01-04 ENCOUNTER — OFFICE VISIT (OUTPATIENT)
Dept: HEMATOLOGY | Age: 79
End: 2024-01-04
Payer: MEDICARE

## 2024-01-04 VITALS
DIASTOLIC BLOOD PRESSURE: 62 MMHG | BODY MASS INDEX: 22.08 KG/M2 | TEMPERATURE: 97.8 F | WEIGHT: 120 LBS | HEART RATE: 72 BPM | OXYGEN SATURATION: 98 % | SYSTOLIC BLOOD PRESSURE: 110 MMHG | HEIGHT: 62 IN

## 2024-01-04 DIAGNOSIS — R97.8 ABNORMAL TUMOR MARKERS: ICD-10-CM

## 2024-01-04 DIAGNOSIS — Z71.89 CARE PLAN DISCUSSED WITH PATIENT: ICD-10-CM

## 2024-01-04 DIAGNOSIS — C22.1 CHOLANGIOCARCINOMA (HCC): Primary | ICD-10-CM

## 2024-01-04 DIAGNOSIS — C22.1 CHOLANGIOCARCINOMA (HCC): ICD-10-CM

## 2024-01-04 DIAGNOSIS — Z71.89 COORDINATION OF COMPLEX CARE: ICD-10-CM

## 2024-01-04 LAB
ALBUMIN SERPL-MCNC: 3.6 G/DL (ref 3.5–5.2)
ALP SERPL-CCNC: 425 U/L (ref 35–104)
ALT SERPL-CCNC: 45 U/L (ref 9–52)
ANION GAP SERPL CALCULATED.3IONS-SCNC: 10 MMOL/L (ref 7–19)
AST SERPL-CCNC: 82 U/L (ref 14–36)
BASOPHILS # BLD: 0.02 K/UL (ref 0.01–0.08)
BASOPHILS NFR BLD: 0.3 % (ref 0.1–1.2)
BILIRUB SERPL-MCNC: 1.2 MG/DL (ref 0.2–1.3)
BUN SERPL-MCNC: 9 MG/DL (ref 7–17)
CALCIUM SERPL-MCNC: 8.5 MG/DL (ref 8.4–10.2)
CANCER AG19-9 SERPL-ACNC: 198 U/ML (ref 0–35)
CEA SERPL-MCNC: 12.2 NG/ML (ref 0–4.7)
CHLORIDE SERPL-SCNC: 104 MMOL/L (ref 98–111)
CO2 SERPL-SCNC: 23 MMOL/L (ref 22–29)
CREAT SERPL-MCNC: <0.5 MG/DL (ref 0.5–1)
EOSINOPHIL # BLD: 0.04 K/UL (ref 0.04–0.54)
EOSINOPHIL NFR BLD: 0.6 % (ref 0.7–7)
ERYTHROCYTE [DISTWIDTH] IN BLOOD BY AUTOMATED COUNT: 15.2 % (ref 11.7–14.4)
GLOBULIN: 3.3 G/DL
GLUCOSE SERPL-MCNC: 66 MG/DL (ref 74–106)
HCT VFR BLD AUTO: 34.3 % (ref 34.1–44.9)
HGB BLD-MCNC: 11.2 G/DL (ref 11.2–15.7)
LYMPHOCYTES # BLD: 1.16 K/UL (ref 1.18–3.74)
LYMPHOCYTES NFR BLD: 18.4 % (ref 19.3–53.1)
MCH RBC QN AUTO: 31 PG (ref 25.6–32.2)
MCHC RBC AUTO-ENTMCNC: 32.7 G/DL (ref 32.3–35.5)
MCV RBC AUTO: 95 FL (ref 79.4–94.8)
MONOCYTES # BLD: 0.73 K/UL (ref 0.24–0.82)
MONOCYTES NFR BLD: 11.6 % (ref 4.7–12.5)
NEUTROPHILS # BLD: 4.32 K/UL (ref 1.56–6.13)
NEUTS SEG NFR BLD: 68.8 % (ref 34–71.1)
PLATELET # BLD AUTO: 186 K/UL (ref 182–369)
PMV BLD AUTO: 11.2 FL (ref 7.4–10.4)
POTASSIUM SERPL-SCNC: 4.2 MMOL/L (ref 3.5–5.1)
PROT SERPL-MCNC: 6.9 G/DL (ref 6.3–8.2)
RBC # BLD AUTO: 3.61 M/UL (ref 3.93–5.22)
SODIUM SERPL-SCNC: 137 MMOL/L (ref 137–145)
WBC # BLD AUTO: 6.29 K/UL (ref 3.98–10.04)

## 2024-01-04 PROCEDURE — 36415 COLL VENOUS BLD VENIPUNCTURE: CPT

## 2024-01-04 PROCEDURE — 99212 OFFICE O/P EST SF 10 MIN: CPT

## 2024-01-04 PROCEDURE — 85025 COMPLETE CBC W/AUTO DIFF WBC: CPT

## 2024-01-04 PROCEDURE — G8484 FLU IMMUNIZE NO ADMIN: HCPCS | Performed by: INTERNAL MEDICINE

## 2024-01-04 PROCEDURE — G8427 DOCREV CUR MEDS BY ELIG CLIN: HCPCS | Performed by: INTERNAL MEDICINE

## 2024-01-04 PROCEDURE — G8420 CALC BMI NORM PARAMETERS: HCPCS | Performed by: INTERNAL MEDICINE

## 2024-01-04 PROCEDURE — 80053 COMPREHEN METABOLIC PANEL: CPT

## 2024-01-04 PROCEDURE — 1036F TOBACCO NON-USER: CPT | Performed by: INTERNAL MEDICINE

## 2024-01-04 PROCEDURE — G8400 PT W/DXA NO RESULTS DOC: HCPCS | Performed by: INTERNAL MEDICINE

## 2024-01-04 PROCEDURE — 1090F PRES/ABSN URINE INCON ASSESS: CPT | Performed by: INTERNAL MEDICINE

## 2024-01-04 PROCEDURE — 99205 OFFICE O/P NEW HI 60 MIN: CPT | Performed by: INTERNAL MEDICINE

## 2024-01-04 PROCEDURE — 1123F ACP DISCUSS/DSCN MKR DOCD: CPT | Performed by: INTERNAL MEDICINE

## 2024-01-04 ASSESSMENT — PROMIS GLOBAL HEALTH SCALE
IN THE PAST 7 DAYS, HOW OFTEN HAVE YOU BEEN BOTHERED BY EMOTIONAL PROBLEMS, SUCH AS FEELING ANXIOUS, DEPRESSED, OR IRRITABLE [ON A SCALE FROM 1 (NEVER) TO 5 (ALWAYS)]?: 4
IN GENERAL, HOW WOULD YOU RATE YOUR MENTAL HEALTH, INCLUDING YOUR MOOD AND YOUR ABILITY TO THINK [ON A SCALE OF 1 (POOR) TO 5 (EXCELLENT)]?: 3
IN GENERAL, HOW WOULD YOU RATE YOUR SATISFACTION WITH YOUR SOCIAL ACTIVITIES AND RELATIONSHIPS [ON A SCALE OF 1 (POOR) TO 5 (EXCELLENT)]?: 3
IN GENERAL, HOW WOULD YOU RATE YOUR PHYSICAL HEALTH [ON A SCALE OF 1 (POOR) TO 5 (EXCELLENT)]?: 2
IN GENERAL, WOULD YOU SAY YOUR HEALTH IS...[ON A SCALE OF 1 (POOR) TO 5 (EXCELLENT)]: 2
IN THE PAST 7 DAYS, HOW WOULD YOU RATE YOUR FATIGUE ON AVERAGE [ON A SCALE FROM 1 (NONE) TO 5 (VERY SEVERE)]?: 4
SUM OF RESPONSES TO QUESTIONS 2, 4, 5, & 10: 13
SUM OF RESPONSES TO QUESTIONS 3, 6, 7, & 8: 14
IN THE PAST 7 DAYS, HOW WOULD YOU RATE YOUR PAIN ON AVERAGE [ON A SCALE FROM 0 (NO PAIN) TO 10 (WORST IMAGINABLE PAIN)]?: 4
TO WHAT EXTENT ARE YOU ABLE TO CARRY OUT YOUR EVERYDAY PHYSICAL ACTIVITIES SUCH AS WALKING, CLIMBING STAIRS, CARRYING GROCERIES, OR MOVING A CHAIR [ON A SCALE OF 1 (NOT AT ALL) TO 5 (COMPLETELY)]?: 4
IN GENERAL, PLEASE RATE HOW WELL YOU CARRY OUT YOUR USUAL SOCIAL ACTIVITIES (INCLUDES ACTIVITIES AT HOME, AT WORK, AND IN YOUR COMMUNITY, AND RESPONSIBILITIES AS A PARENT, CHILD, SPOUSE, EMPLOYEE, FRIEND, ETC) [ON A SCALE OF 1 (POOR) TO 5 (EXCELLENT)]?: 3
IN GENERAL, WOULD YOU SAY YOUR QUALITY OF LIFE IS...[ON A SCALE OF 1 (POOR) TO 5 (EXCELLENT)]: 3

## 2024-01-04 NOTE — PROGRESS NOTES
visit:    Cholangiocarcinoma (HCC)  -     External Referral To General Surgery  -     PET CT SKULL BASE TO MID THIGH; Future  -     CBC with Auto Differential; Future  -     Comprehensive Metabolic Panel; Future  -     CEA; Future  -     Cancer Antigen 19-9; Future    Care plan discussed with patient    Abnormal tumor markers  -     CEA; Future  -     Cancer Antigen 19-9; Future           History of cholangiocarcinoma, 2011  The patient was counseled today about diagnosis, staging, prognosis, diagnostic tests, medications, side effects and disease management.     Status post Whipple procedure followed by radiation and chemotherapy with Xeloda  December 2023-CT scan abdomen [facility] with concern findings for recurrence  12/20/2023-EGD/EUS with FNA biopsy-peritoneal fluid removed.  Cytology negative.    At this point, I do not have cytologic or biopsy evidence of recurrence.  However, the elevation of her CA 19-9 and CEA are quite concerning for recurrent disease.  I believe that a PET scan would help clarify further and help guide areas for biopsy.  Therefore, I will go ahead and order PET scan to be done here at [facility].  In addition, I called and discussed with Dr. Ellis Guthrie, surgical oncology.  Dr. Guthrie will graciously see the patient tomorrow and review her CT scan.    PLAN:  RTC with MD 2 weeks  CBC,CMP, CEA ,  today  Recommend PET Scan at [facility]  Obtain medical records from [facility]  Refer to Dr.William Guthrie/Surgical Oncology at [facility] already scheduled 1/5/24 at 8:30  Recommend for patient to  copy of CT Abd/Pelvis disc for appointment with       Follow Up:   Return in 2 weeks (on 1/18/2024) for CBC, Appointment with Dr. Vargas after PET scan.  Refer to Dr.William Guthrie at [facility]. Patient is already scheduled for 1/5/24 at 8:30     Sched PET Scan       Cheryl BOURGEOIS am pre-charting as a registered nurse for Isabel Vargas,

## 2024-01-12 ENCOUNTER — HOSPITAL ENCOUNTER (OUTPATIENT)
Dept: NUCLEAR MEDICINE | Age: 79
Discharge: HOME | End: 2024-01-14
Attending: INTERNAL MEDICINE
Payer: MEDICARE

## 2024-01-12 DIAGNOSIS — C22.1 CHOLANGIOCARCINOMA (HCC): ICD-10-CM

## 2024-01-12 LAB
GLUCOSE BLD-MCNC: 52 MG/DL (ref 70–99)
PERFORMED ON: ABNORMAL

## 2024-01-12 PROCEDURE — A9609 HC RX DIAGNOSTIC RADIOPHARMACEUTICAL: HCPCS | Performed by: INTERNAL MEDICINE

## 2024-01-12 PROCEDURE — 78815 PET IMAGE W/CT SKULL-THIGH: CPT

## 2024-01-12 PROCEDURE — 3430000000 HC RX DIAGNOSTIC RADIOPHARMACEUTICAL: Performed by: INTERNAL MEDICINE

## 2024-01-12 PROCEDURE — 82962 GLUCOSE BLOOD TEST: CPT

## 2024-01-12 RX ORDER — FLUDEOXYGLUCOSE F 18 200 MCI/ML
10 INJECTION, SOLUTION INTRAVENOUS
Status: COMPLETED | OUTPATIENT
Start: 2024-01-12 | End: 2024-01-12

## 2024-01-12 RX ADMIN — FLUDEOXYGLUCOSE F 18 10 MILLICURIE: 200 INJECTION, SOLUTION INTRAVENOUS at 09:19

## 2024-01-15 ENCOUNTER — TELEPHONE (OUTPATIENT)
Dept: HEMATOLOGY | Age: 79
End: 2024-01-15

## 2024-01-15 NOTE — TELEPHONE ENCOUNTER
Called patient to remind them of their appointment on 01/17/2024 and left detailed vm of their appointment date and time.

## 2024-01-16 NOTE — PROGRESS NOTES
pancreatic duct is stable. There is a biliary stent with air in the stent and some air within the biliary tree. There are radiopaque densities in the liver likely related to prior embolic therapy. Wall thickening of the descending colon, sigmoid colon and rectum   suggesting colitis. No small bowel distention is seen. Prominent left gonadal vein and pelvic veins. The finding is stable. Small nonobstructive renal stone. Small fat-containing left inguinal hernia. Degenerative changes of the spine and hips left greater than right.   12/13/23 CT abd/pelvis ([facility]): Overall findings concerning for disease recurrence or progression.  Moderate intrahepatic and extrahepatic dilation, previously mild on 6/13/2022. Increased conspicuity and fullness of the operative bed with focal vague density in an area that was previously fat density.  A few new or enlarged mesenteric lymph nodes as detailed above. Haziness of the fat in the RIGHT paracolic gutter. New small fluid in the pelvis. Prominent inferior heart size. Calcified atherosclerosis. Nonobstructing RIGHT upper pole calculus. No hydronephrosis or ureteral calculus.   12/20/23 Tumor Markers: CA 19-9 166 CEA 9.9.   12/20/23 EGD with EUS by Dr John Mueller/[facility]: The celiac axis and associated vascular structures was identified and examined. No concerning or malignant lymphadenopathy was identified. Limited views of the left lobe of the liver revealed no obvious biliary dilation or focal hepatic mass.  There was evidence of some perihepatic ascites. The EUS scope was advanced to the distal stomach and just distal to the gastroenteric anastomosis.  I was unable to identify any abnormal tissue that would correspond to the fullness or findings on her CT scan.  No concerning lymphadenopathy was identified.  However, typical EUS evaluation was likely limited due to her postsurgical anatomy. Due to the clinical concern for possible recurrent malignancy, I elected to attempt

## 2024-01-17 ENCOUNTER — HOSPITAL ENCOUNTER (OUTPATIENT)
Dept: INFUSION THERAPY | Age: 79
Discharge: HOME | End: 2024-01-17
Payer: MEDICARE

## 2024-01-17 ENCOUNTER — OFFICE VISIT (OUTPATIENT)
Dept: HEMATOLOGY | Age: 79
End: 2024-01-17
Payer: MEDICARE

## 2024-01-17 VITALS
BODY MASS INDEX: 21.53 KG/M2 | HEIGHT: 62 IN | DIASTOLIC BLOOD PRESSURE: 70 MMHG | HEART RATE: 64 BPM | WEIGHT: 117 LBS | SYSTOLIC BLOOD PRESSURE: 122 MMHG | OXYGEN SATURATION: 98 % | TEMPERATURE: 98.6 F

## 2024-01-17 DIAGNOSIS — Z71.89 CARE PLAN DISCUSSED WITH PATIENT: Primary | ICD-10-CM

## 2024-01-17 DIAGNOSIS — C22.1 CHOLANGIOCARCINOMA (HCC): ICD-10-CM

## 2024-01-17 DIAGNOSIS — R97.8 ABNORMAL TUMOR MARKERS: ICD-10-CM

## 2024-01-17 DIAGNOSIS — C22.1 CHOLANGIOCARCINOMA (HCC): Primary | ICD-10-CM

## 2024-01-17 DIAGNOSIS — R70.1 ABNORMAL PLASMA VISCOSITY: ICD-10-CM

## 2024-01-17 DIAGNOSIS — Z71.89 COORDINATION OF COMPLEX CARE: ICD-10-CM

## 2024-01-17 LAB
ALBUMIN SERPL-MCNC: 3.6 G/DL (ref 3.5–5.2)
ALP SERPL-CCNC: 458 U/L (ref 35–104)
ALT SERPL-CCNC: 51 U/L (ref 9–52)
ANION GAP SERPL CALCULATED.3IONS-SCNC: -2 MMOL/L (ref 7–19)
AST SERPL-CCNC: 100 U/L (ref 14–36)
BASOPHILS # BLD: 0.02 K/UL (ref 0.01–0.08)
BASOPHILS NFR BLD: 0.3 % (ref 0.1–1.2)
BILIRUB SERPL-MCNC: 1.1 MG/DL (ref 0.2–1.3)
BUN SERPL-MCNC: 11 MG/DL (ref 7–17)
CALCIUM SERPL-MCNC: 8.4 MG/DL (ref 8.4–10.2)
CANCER AG19-9 SERPL-ACNC: 183 U/ML (ref 0–35)
CEA SERPL-MCNC: 16 NG/ML (ref 0–4.7)
CHLORIDE SERPL-SCNC: 111 MMOL/L (ref 98–111)
CO2 SERPL-SCNC: 28 MMOL/L (ref 22–29)
CREAT SERPL-MCNC: 0.5 MG/DL (ref 0.5–1)
EOSINOPHIL # BLD: 0.06 K/UL (ref 0.04–0.54)
EOSINOPHIL NFR BLD: 0.9 % (ref 0.7–7)
ERYTHROCYTE [DISTWIDTH] IN BLOOD BY AUTOMATED COUNT: 15.1 % (ref 11.7–14.4)
GLOBULIN: 3.3 G/DL
GLUCOSE SERPL-MCNC: 72 MG/DL (ref 74–106)
HCT VFR BLD AUTO: 34.6 % (ref 34.1–44.9)
HGB BLD-MCNC: 11.7 G/DL (ref 11.2–15.7)
LYMPHOCYTES # BLD: 1.38 K/UL (ref 1.18–3.74)
LYMPHOCYTES NFR BLD: 21.4 % (ref 19.3–53.1)
MCH RBC QN AUTO: 31.5 PG (ref 25.6–32.2)
MCHC RBC AUTO-ENTMCNC: 33.8 G/DL (ref 32.3–35.5)
MCV RBC AUTO: 93.3 FL (ref 79.4–94.8)
MONOCYTES # BLD: 0.78 K/UL (ref 0.24–0.82)
MONOCYTES NFR BLD: 12.1 % (ref 4.7–12.5)
NEUTROPHILS # BLD: 4.16 K/UL (ref 1.56–6.13)
NEUTS SEG NFR BLD: 64.7 % (ref 34–71.1)
PLATELET # BLD AUTO: 172 K/UL (ref 182–369)
PMV BLD AUTO: 11.3 FL (ref 7.4–10.4)
POTASSIUM SERPL-SCNC: 4.3 MMOL/L (ref 3.5–5.1)
PROT SERPL-MCNC: 6.9 G/DL (ref 6.3–8.2)
RBC # BLD AUTO: 3.71 M/UL (ref 3.93–5.22)
SODIUM SERPL-SCNC: 137 MMOL/L (ref 137–145)
WBC # BLD AUTO: 6.44 K/UL (ref 3.98–10.04)

## 2024-01-17 PROCEDURE — G8484 FLU IMMUNIZE NO ADMIN: HCPCS | Performed by: INTERNAL MEDICINE

## 2024-01-17 PROCEDURE — G8427 DOCREV CUR MEDS BY ELIG CLIN: HCPCS | Performed by: INTERNAL MEDICINE

## 2024-01-17 PROCEDURE — G8400 PT W/DXA NO RESULTS DOC: HCPCS | Performed by: INTERNAL MEDICINE

## 2024-01-17 PROCEDURE — 1090F PRES/ABSN URINE INCON ASSESS: CPT | Performed by: INTERNAL MEDICINE

## 2024-01-17 PROCEDURE — 85025 COMPLETE CBC W/AUTO DIFF WBC: CPT

## 2024-01-17 PROCEDURE — 36415 COLL VENOUS BLD VENIPUNCTURE: CPT

## 2024-01-17 PROCEDURE — 99214 OFFICE O/P EST MOD 30 MIN: CPT | Performed by: INTERNAL MEDICINE

## 2024-01-17 PROCEDURE — 80053 COMPREHEN METABOLIC PANEL: CPT

## 2024-01-17 PROCEDURE — G8420 CALC BMI NORM PARAMETERS: HCPCS | Performed by: INTERNAL MEDICINE

## 2024-01-17 PROCEDURE — 1036F TOBACCO NON-USER: CPT | Performed by: INTERNAL MEDICINE

## 2024-01-17 PROCEDURE — 1123F ACP DISCUSS/DSCN MKR DOCD: CPT | Performed by: INTERNAL MEDICINE

## 2024-01-17 PROCEDURE — 99212 OFFICE O/P EST SF 10 MIN: CPT

## 2024-02-06 ENCOUNTER — TELEPHONE (OUTPATIENT)
Dept: HEMATOLOGY | Age: 79
End: 2024-02-06

## 2024-02-06 NOTE — TELEPHONE ENCOUNTER
MARLYN Anthony completed follow-up promise screening call.  Social work introduced self and explained role and source of support.  Patient denies needs or concerns at this time.  Social work encouraged the patient to call if assistance is needed in the future.

## 2024-02-08 ENCOUNTER — TRANSCRIBE ORDERS (OUTPATIENT)
Dept: ADMINISTRATIVE | Facility: HOSPITAL | Age: 79
End: 2024-02-08
Payer: MEDICARE

## 2024-02-08 ENCOUNTER — APPOINTMENT (OUTPATIENT)
Dept: ULTRASOUND IMAGING | Facility: HOSPITAL | Age: 79
DRG: 872 | End: 2024-02-08
Payer: MEDICARE

## 2024-02-08 ENCOUNTER — HOSPITAL ENCOUNTER (INPATIENT)
Facility: HOSPITAL | Age: 79
LOS: 2 days | Discharge: HOME | DRG: 872 | End: 2024-02-10
Attending: FAMILY MEDICINE | Admitting: FAMILY MEDICINE
Payer: MEDICARE

## 2024-02-08 ENCOUNTER — LAB (OUTPATIENT)
Dept: LAB | Facility: HOSPITAL | Age: 79
End: 2024-02-08
Payer: MEDICARE

## 2024-02-08 ENCOUNTER — APPOINTMENT (OUTPATIENT)
Dept: GENERAL RADIOLOGY | Facility: HOSPITAL | Age: 79
DRG: 872 | End: 2024-02-08
Payer: MEDICARE

## 2024-02-08 ENCOUNTER — APPOINTMENT (OUTPATIENT)
Dept: CT IMAGING | Facility: HOSPITAL | Age: 79
DRG: 872 | End: 2024-02-08
Payer: MEDICARE

## 2024-02-08 DIAGNOSIS — N17.9 AKI (ACUTE KIDNEY INJURY): ICD-10-CM

## 2024-02-08 DIAGNOSIS — I10 ESSENTIAL HYPERTENSION: ICD-10-CM

## 2024-02-08 DIAGNOSIS — R65.20 SEPSIS WITH ACUTE ORGAN DYSFUNCTION, DUE TO UNSPECIFIED ORGANISM, UNSPECIFIED ORGAN DYSFUNCTION TYPE, UNSPECIFIED WHETHER SEPTIC SHOCK PRESENT: Primary | ICD-10-CM

## 2024-02-08 DIAGNOSIS — I95.9 HYPOTENSION, UNSPECIFIED HYPOTENSION TYPE: ICD-10-CM

## 2024-02-08 DIAGNOSIS — R74.01 TRANSAMINITIS: ICD-10-CM

## 2024-02-08 DIAGNOSIS — A41.9 SEPSIS WITH ACUTE ORGAN DYSFUNCTION, DUE TO UNSPECIFIED ORGANISM, UNSPECIFIED ORGAN DYSFUNCTION TYPE, UNSPECIFIED WHETHER SEPTIC SHOCK PRESENT: Primary | ICD-10-CM

## 2024-02-08 DIAGNOSIS — I95.9 HYPOTENSION, UNSPECIFIED HYPOTENSION TYPE: Primary | ICD-10-CM

## 2024-02-08 LAB
ALBUMIN SERPL-MCNC: 3.2 G/DL (ref 3.5–5.2)
ALBUMIN SERPL-MCNC: 3.5 G/DL (ref 3.5–5.2)
ALBUMIN/GLOB SERPL: 1 G/DL
ALBUMIN/GLOB SERPL: 1 G/DL
ALP SERPL-CCNC: 473 U/L (ref 39–117)
ALP SERPL-CCNC: 492 U/L (ref 39–117)
ALT SERPL W P-5'-P-CCNC: 50 U/L (ref 1–33)
ALT SERPL W P-5'-P-CCNC: 51 U/L (ref 1–33)
AMMONIA BLD-SCNC: 30 UMOL/L (ref 11–51)
ANION GAP SERPL CALCULATED.3IONS-SCNC: 11 MMOL/L (ref 5–15)
ANION GAP SERPL CALCULATED.3IONS-SCNC: 12 MMOL/L (ref 5–15)
APAP SERPL-MCNC: <5 MCG/ML (ref 0–30)
APTT PPP: 35 SECONDS (ref 24.5–36)
AST SERPL-CCNC: 75 U/L (ref 1–32)
AST SERPL-CCNC: 86 U/L (ref 1–32)
B PARAPERT DNA SPEC QL NAA+PROBE: NOT DETECTED
B PERT DNA SPEC QL NAA+PROBE: NOT DETECTED
BACTERIA UR QL AUTO: ABNORMAL /HPF
BASOPHILS # BLD AUTO: 0.05 10*3/MM3 (ref 0–0.2)
BASOPHILS # BLD AUTO: 0.06 10*3/MM3 (ref 0–0.2)
BASOPHILS NFR BLD AUTO: 0.2 % (ref 0–1.5)
BASOPHILS NFR BLD AUTO: 0.3 % (ref 0–1.5)
BILIRUB SERPL-MCNC: 1.5 MG/DL (ref 0–1.2)
BILIRUB SERPL-MCNC: 1.7 MG/DL (ref 0–1.2)
BILIRUB UR QL STRIP: ABNORMAL
BUN SERPL-MCNC: 30 MG/DL (ref 8–23)
BUN SERPL-MCNC: 35 MG/DL (ref 8–23)
BUN/CREAT SERPL: 23.3 (ref 7–25)
BUN/CREAT SERPL: 25.4 (ref 7–25)
C PNEUM DNA NPH QL NAA+NON-PROBE: NOT DETECTED
CALCIUM SPEC-SCNC: 8.6 MG/DL (ref 8.6–10.5)
CALCIUM SPEC-SCNC: 9.1 MG/DL (ref 8.6–10.5)
CHLORIDE SERPL-SCNC: 96 MMOL/L (ref 98–107)
CHLORIDE SERPL-SCNC: 97 MMOL/L (ref 98–107)
CLARITY UR: CLEAR
CO2 SERPL-SCNC: 24 MMOL/L (ref 22–29)
CO2 SERPL-SCNC: 24 MMOL/L (ref 22–29)
COLOR UR: ABNORMAL
CREAT SERPL-MCNC: 1.18 MG/DL (ref 0.57–1)
CREAT SERPL-MCNC: 1.5 MG/DL (ref 0.57–1)
CRP SERPL-MCNC: 13.3 MG/DL (ref 0–0.5)
D-LACTATE SERPL-SCNC: 1.5 MMOL/L (ref 0.5–2)
DEPRECATED RDW RBC AUTO: 52.4 FL (ref 37–54)
DEPRECATED RDW RBC AUTO: 52.6 FL (ref 37–54)
EGFRCR SERPLBLD CKD-EPI 2021: 36 ML/MIN/1.73
EGFRCR SERPLBLD CKD-EPI 2021: 48 ML/MIN/1.73
EOSINOPHIL # BLD AUTO: 0.02 10*3/MM3 (ref 0–0.4)
EOSINOPHIL # BLD AUTO: 0.02 10*3/MM3 (ref 0–0.4)
EOSINOPHIL NFR BLD AUTO: 0.1 % (ref 0.3–6.2)
EOSINOPHIL NFR BLD AUTO: 0.1 % (ref 0.3–6.2)
ERYTHROCYTE [DISTWIDTH] IN BLOOD BY AUTOMATED COUNT: 15 % (ref 12.3–15.4)
ERYTHROCYTE [DISTWIDTH] IN BLOOD BY AUTOMATED COUNT: 15.1 % (ref 12.3–15.4)
ERYTHROCYTE [SEDIMENTATION RATE] IN BLOOD: 51 MM/HR (ref 0–30)
FLUAV SUBTYP SPEC NAA+PROBE: NOT DETECTED
FLUBV RNA ISLT QL NAA+PROBE: NOT DETECTED
GLOBULIN UR ELPH-MCNC: 3.3 GM/DL
GLOBULIN UR ELPH-MCNC: 3.4 GM/DL
GLUCOSE SERPL-MCNC: 101 MG/DL (ref 65–99)
GLUCOSE SERPL-MCNC: 55 MG/DL (ref 65–99)
GLUCOSE UR STRIP-MCNC: NEGATIVE MG/DL
HADV DNA SPEC NAA+PROBE: NOT DETECTED
HAV IGM SERPL QL IA: NORMAL
HBV CORE IGM SERPL QL IA: NORMAL
HBV SURFACE AG SERPL QL IA: NORMAL
HCOV 229E RNA SPEC QL NAA+PROBE: NOT DETECTED
HCOV HKU1 RNA SPEC QL NAA+PROBE: NOT DETECTED
HCOV NL63 RNA SPEC QL NAA+PROBE: NOT DETECTED
HCOV OC43 RNA SPEC QL NAA+PROBE: NOT DETECTED
HCT VFR BLD AUTO: 35.2 % (ref 34–46.6)
HCT VFR BLD AUTO: 35.3 % (ref 34–46.6)
HCV AB SER DONR QL: NORMAL
HGB BLD-MCNC: 11.7 G/DL (ref 12–15.9)
HGB BLD-MCNC: 11.7 G/DL (ref 12–15.9)
HGB UR QL STRIP.AUTO: NEGATIVE
HMPV RNA NPH QL NAA+NON-PROBE: NOT DETECTED
HPIV1 RNA ISLT QL NAA+PROBE: NOT DETECTED
HPIV2 RNA SPEC QL NAA+PROBE: NOT DETECTED
HPIV3 RNA NPH QL NAA+PROBE: NOT DETECTED
HPIV4 P GENE NPH QL NAA+PROBE: NOT DETECTED
HYALINE CASTS UR QL AUTO: ABNORMAL /LPF
IMM GRANULOCYTES # BLD AUTO: 0.26 10*3/MM3 (ref 0–0.05)
IMM GRANULOCYTES # BLD AUTO: 0.26 10*3/MM3 (ref 0–0.05)
IMM GRANULOCYTES NFR BLD AUTO: 1.1 % (ref 0–0.5)
IMM GRANULOCYTES NFR BLD AUTO: 1.3 % (ref 0–0.5)
INR PPP: 1.12 (ref 0.91–1.09)
KETONES UR QL STRIP: NEGATIVE
LEUKOCYTE ESTERASE UR QL STRIP.AUTO: ABNORMAL
LIPASE SERPL-CCNC: 5 U/L (ref 13–60)
LYMPHOCYTES # BLD AUTO: 1.36 10*3/MM3 (ref 0.7–3.1)
LYMPHOCYTES # BLD AUTO: 1.48 10*3/MM3 (ref 0.7–3.1)
LYMPHOCYTES NFR BLD AUTO: 5.8 % (ref 19.6–45.3)
LYMPHOCYTES NFR BLD AUTO: 7.3 % (ref 19.6–45.3)
M PNEUMO IGG SER IA-ACNC: NOT DETECTED
MAGNESIUM SERPL-MCNC: 2.1 MG/DL (ref 1.6–2.4)
MCH RBC QN AUTO: 31.4 PG (ref 26.6–33)
MCH RBC QN AUTO: 31.5 PG (ref 26.6–33)
MCHC RBC AUTO-ENTMCNC: 33.1 G/DL (ref 31.5–35.7)
MCHC RBC AUTO-ENTMCNC: 33.2 G/DL (ref 31.5–35.7)
MCV RBC AUTO: 94.6 FL (ref 79–97)
MCV RBC AUTO: 94.9 FL (ref 79–97)
MONOCYTES # BLD AUTO: 2.12 10*3/MM3 (ref 0.1–0.9)
MONOCYTES # BLD AUTO: 2.32 10*3/MM3 (ref 0.1–0.9)
MONOCYTES NFR BLD AUTO: 10.4 % (ref 5–12)
MONOCYTES NFR BLD AUTO: 9.8 % (ref 5–12)
NEUTROPHILS NFR BLD AUTO: 16.45 10*3/MM3 (ref 1.7–7)
NEUTROPHILS NFR BLD AUTO: 19.58 10*3/MM3 (ref 1.7–7)
NEUTROPHILS NFR BLD AUTO: 80.7 % (ref 42.7–76)
NEUTROPHILS NFR BLD AUTO: 82.9 % (ref 42.7–76)
NITRITE UR QL STRIP: NEGATIVE
NRBC BLD AUTO-RTO: 0 /100 WBC (ref 0–0.2)
NRBC BLD AUTO-RTO: 0 /100 WBC (ref 0–0.2)
PH UR STRIP.AUTO: 6 [PH] (ref 5–8)
PLATELET # BLD AUTO: 149 10*3/MM3 (ref 140–450)
PLATELET # BLD AUTO: 152 10*3/MM3 (ref 140–450)
PMV BLD AUTO: 11.3 FL (ref 6–12)
PMV BLD AUTO: 11.4 FL (ref 6–12)
POTASSIUM SERPL-SCNC: 4 MMOL/L (ref 3.5–5.2)
POTASSIUM SERPL-SCNC: 4.1 MMOL/L (ref 3.5–5.2)
PROCALCITONIN SERPL-MCNC: 49.49 NG/ML (ref 0–0.25)
PROCALCITONIN SERPL-MCNC: 52.98 NG/ML (ref 0–0.25)
PROT SERPL-MCNC: 6.5 G/DL (ref 6–8.5)
PROT SERPL-MCNC: 6.9 G/DL (ref 6–8.5)
PROT UR QL STRIP: ABNORMAL
PROTHROMBIN TIME: 14.6 SECONDS (ref 11.8–14.8)
RBC # BLD AUTO: 3.71 10*6/MM3 (ref 3.77–5.28)
RBC # BLD AUTO: 3.73 10*6/MM3 (ref 3.77–5.28)
RBC # UR STRIP: ABNORMAL /HPF
REF LAB TEST METHOD: ABNORMAL
RHINOVIRUS RNA SPEC NAA+PROBE: NOT DETECTED
RSV RNA NPH QL NAA+NON-PROBE: DETECTED
SALICYLATES SERPL-MCNC: <0.3 MG/DL
SARS-COV-2 RNA NPH QL NAA+NON-PROBE: NOT DETECTED
SODIUM SERPL-SCNC: 132 MMOL/L (ref 136–145)
SODIUM SERPL-SCNC: 132 MMOL/L (ref 136–145)
SP GR UR STRIP: 1.01 (ref 1–1.03)
SQUAMOUS #/AREA URNS HPF: ABNORMAL /HPF
UROBILINOGEN UR QL STRIP: ABNORMAL
WBC # UR STRIP: ABNORMAL /HPF
WBC NRBC COR # BLD AUTO: 20.38 10*3/MM3 (ref 3.4–10.8)
WBC NRBC COR # BLD AUTO: 23.6 10*3/MM3 (ref 3.4–10.8)

## 2024-02-08 PROCEDURE — 83735 ASSAY OF MAGNESIUM: CPT | Performed by: PHYSICIAN ASSISTANT

## 2024-02-08 PROCEDURE — 99285 EMERGENCY DEPT VISIT HI MDM: CPT

## 2024-02-08 PROCEDURE — 84145 PROCALCITONIN (PCT): CPT

## 2024-02-08 PROCEDURE — 80053 COMPREHEN METABOLIC PANEL: CPT | Performed by: PHYSICIAN ASSISTANT

## 2024-02-08 PROCEDURE — 80053 COMPREHEN METABOLIC PANEL: CPT

## 2024-02-08 PROCEDURE — 86140 C-REACTIVE PROTEIN: CPT | Performed by: PHYSICIAN ASSISTANT

## 2024-02-08 PROCEDURE — 76705 ECHO EXAM OF ABDOMEN: CPT

## 2024-02-08 PROCEDURE — 25010000002 VANCOMYCIN 1 G RECONSTITUTED SOLUTION 1 EACH VIAL: Performed by: FAMILY MEDICINE

## 2024-02-08 PROCEDURE — 85610 PROTHROMBIN TIME: CPT | Performed by: PHYSICIAN ASSISTANT

## 2024-02-08 PROCEDURE — 85025 COMPLETE CBC W/AUTO DIFF WBC: CPT | Performed by: PHYSICIAN ASSISTANT

## 2024-02-08 PROCEDURE — 25510000001 IOPAMIDOL 61 % SOLUTION: Performed by: PHYSICIAN ASSISTANT

## 2024-02-08 PROCEDURE — 81001 URINALYSIS AUTO W/SCOPE: CPT | Performed by: PHYSICIAN ASSISTANT

## 2024-02-08 PROCEDURE — 85730 THROMBOPLASTIN TIME PARTIAL: CPT | Performed by: PHYSICIAN ASSISTANT

## 2024-02-08 PROCEDURE — 36415 COLL VENOUS BLD VENIPUNCTURE: CPT

## 2024-02-08 PROCEDURE — 86301 IMMUNOASSAY TUMOR CA 19-9: CPT | Performed by: INTERNAL MEDICINE

## 2024-02-08 PROCEDURE — 83605 ASSAY OF LACTIC ACID: CPT | Performed by: PHYSICIAN ASSISTANT

## 2024-02-08 PROCEDURE — 82140 ASSAY OF AMMONIA: CPT | Performed by: PHYSICIAN ASSISTANT

## 2024-02-08 PROCEDURE — 87040 BLOOD CULTURE FOR BACTERIA: CPT | Performed by: PHYSICIAN ASSISTANT

## 2024-02-08 PROCEDURE — 71045 X-RAY EXAM CHEST 1 VIEW: CPT

## 2024-02-08 PROCEDURE — 74177 CT ABD & PELVIS W/CONTRAST: CPT

## 2024-02-08 PROCEDURE — 85025 COMPLETE CBC W/AUTO DIFF WBC: CPT

## 2024-02-08 PROCEDURE — 80179 DRUG ASSAY SALICYLATE: CPT | Performed by: PHYSICIAN ASSISTANT

## 2024-02-08 PROCEDURE — 80074 ACUTE HEPATITIS PANEL: CPT | Performed by: PHYSICIAN ASSISTANT

## 2024-02-08 PROCEDURE — 25810000003 SODIUM CHLORIDE 0.9 % SOLUTION 250 ML FLEX CONT: Performed by: FAMILY MEDICINE

## 2024-02-08 PROCEDURE — 0202U NFCT DS 22 TRGT SARS-COV-2: CPT | Performed by: PHYSICIAN ASSISTANT

## 2024-02-08 PROCEDURE — 80143 DRUG ASSAY ACETAMINOPHEN: CPT | Performed by: PHYSICIAN ASSISTANT

## 2024-02-08 PROCEDURE — 84145 PROCALCITONIN (PCT): CPT | Performed by: PHYSICIAN ASSISTANT

## 2024-02-08 PROCEDURE — 83690 ASSAY OF LIPASE: CPT | Performed by: PHYSICIAN ASSISTANT

## 2024-02-08 PROCEDURE — 85652 RBC SED RATE AUTOMATED: CPT | Performed by: PHYSICIAN ASSISTANT

## 2024-02-08 PROCEDURE — 25010000002 PIPERACILLIN SOD-TAZOBACTAM PER 1 G: Performed by: PHYSICIAN ASSISTANT

## 2024-02-08 PROCEDURE — 82378 CARCINOEMBRYONIC ANTIGEN: CPT | Performed by: INTERNAL MEDICINE

## 2024-02-08 PROCEDURE — 25810000003 SODIUM CHLORIDE 0.9 % SOLUTION: Performed by: PHYSICIAN ASSISTANT

## 2024-02-08 RX ORDER — SODIUM CHLORIDE 0.9 % (FLUSH) 0.9 %
10 SYRINGE (ML) INJECTION AS NEEDED
Status: DISCONTINUED | OUTPATIENT
Start: 2024-02-08 | End: 2024-02-10 | Stop reason: HOSPADM

## 2024-02-08 RX ORDER — SODIUM CHLORIDE 9 MG/ML
40 INJECTION, SOLUTION INTRAVENOUS AS NEEDED
Status: DISCONTINUED | OUTPATIENT
Start: 2024-02-08 | End: 2024-02-10 | Stop reason: HOSPADM

## 2024-02-08 RX ORDER — ENOXAPARIN SODIUM 100 MG/ML
30 INJECTION SUBCUTANEOUS DAILY
Status: DISCONTINUED | OUTPATIENT
Start: 2024-02-09 | End: 2024-02-10 | Stop reason: HOSPADM

## 2024-02-08 RX ORDER — AMOXICILLIN 250 MG
2 CAPSULE ORAL 2 TIMES DAILY PRN
Status: DISCONTINUED | OUTPATIENT
Start: 2024-02-08 | End: 2024-02-10 | Stop reason: HOSPADM

## 2024-02-08 RX ORDER — BISACODYL 10 MG
10 SUPPOSITORY, RECTAL RECTAL DAILY PRN
Status: DISCONTINUED | OUTPATIENT
Start: 2024-02-08 | End: 2024-02-10 | Stop reason: HOSPADM

## 2024-02-08 RX ORDER — ONDANSETRON 2 MG/ML
4 INJECTION INTRAMUSCULAR; INTRAVENOUS EVERY 6 HOURS PRN
Status: DISCONTINUED | OUTPATIENT
Start: 2024-02-08 | End: 2024-02-10 | Stop reason: HOSPADM

## 2024-02-08 RX ORDER — POLYETHYLENE GLYCOL 3350 17 G/17G
17 POWDER, FOR SOLUTION ORAL DAILY PRN
Status: DISCONTINUED | OUTPATIENT
Start: 2024-02-08 | End: 2024-02-10 | Stop reason: HOSPADM

## 2024-02-08 RX ORDER — LORAZEPAM 1 MG/1
1 TABLET ORAL EVERY 8 HOURS PRN
Status: DISCONTINUED | OUTPATIENT
Start: 2024-02-08 | End: 2024-02-10 | Stop reason: HOSPADM

## 2024-02-08 RX ORDER — HYDROCODONE BITARTRATE AND ACETAMINOPHEN 5; 325 MG/1; MG/1
1 TABLET ORAL EVERY 4 HOURS PRN
Status: DISCONTINUED | OUTPATIENT
Start: 2024-02-08 | End: 2024-02-10 | Stop reason: HOSPADM

## 2024-02-08 RX ORDER — SODIUM CHLORIDE 0.9 % (FLUSH) 0.9 %
10 SYRINGE (ML) INJECTION EVERY 12 HOURS SCHEDULED
Status: DISCONTINUED | OUTPATIENT
Start: 2024-02-08 | End: 2024-02-10 | Stop reason: HOSPADM

## 2024-02-08 RX ORDER — BISACODYL 5 MG/1
5 TABLET, DELAYED RELEASE ORAL DAILY PRN
Status: DISCONTINUED | OUTPATIENT
Start: 2024-02-08 | End: 2024-02-10 | Stop reason: HOSPADM

## 2024-02-08 RX ADMIN — Medication 10 ML: at 22:47

## 2024-02-08 RX ADMIN — IOPAMIDOL 75 ML: 612 INJECTION, SOLUTION INTRAVENOUS at 18:24

## 2024-02-08 RX ADMIN — PIPERACILLIN SODIUM AND TAZOBACTAM SODIUM 3.38 G: 3; .375 INJECTION, POWDER, LYOPHILIZED, FOR SOLUTION INTRAVENOUS at 21:05

## 2024-02-08 RX ADMIN — SODIUM CHLORIDE 2000 ML: 9 INJECTION, SOLUTION INTRAVENOUS at 17:50

## 2024-02-08 RX ADMIN — VANCOMYCIN HYDROCHLORIDE 1000 MG: 1 INJECTION, POWDER, LYOPHILIZED, FOR SOLUTION INTRAVENOUS at 22:47

## 2024-02-08 NOTE — ED PROVIDER NOTES
"Subjective   History of Present Illness    Patient is a 78-year-old female presenting to ED with hypotension and concern for infection.  PMH significant for cholangiocarcinoma, hypertension, migraines, status post Whipple procedure, cholecystectomy.   at bedside to provide additional history.  Patient states she has a remote history 12 years ago of cholangiocarcinoma for which she last received any treatment in 2011.  Patient states that she did require multiple biliary stents all performed by Dr. Palomares and most recently follow up with Dr. Osman at [place].  Patient reports that in the middle of December she started having problems with abdominal distention describing \"I look like I was pregnant at 78.\"  Patient states that she recently transition primary care providers for which she was treated for a sinus infection with antibiotic and steroids and was started on spironolactone as well as furosemide.  Patient believes that she is taking 20 mg a day and states that anytime she eats food her abdominal distention significantly worsens however after initiating the Lasix she feels that it is getting somewhat better.  Patient did state over the past couple days she has been \"staggering\" and describes feeling possibly dehydrated but denies any dizziness, lightheadedness, or any other neurological symptoms.  Patient states that she does suffer with diarrhea which started in December as well however with the use of Lomotil she has been able to control it.  Patient denies any new nausea, vomiting, black/bloody/tarry stools, dysuria, hematuria, flank pain.  Patient denies fevers, chills, diaphoresis.  Patient states that after completion of the recent antibiotics and steroid she has some sinus drainage but no further productive cough, sinus pressure, sore throat.  Patient denies any other known sick contact.  Patient states that she went for a follow-up appointment today at the primary care provider's office where " "her blood pressures were noted to be 80 systolic over 50s diastolic at which time they recommended presentation to the ER after reviewing her labs.    Records reviewed show patient was last seen in the outpatient setting at the hematology oncology office on 1/17/2024 for abnormal tumor markers, cholangiocarcinoma, abnormal plasma callosity, coordination of complex care.    Patient had lab work performed today Which revealed leukocytosis 23.6, H&H 11.7/35.2, elevated ANC 19.58. CMP revealed creatinine 1.18, BUN 30, GFR 48, hyponatremia 132, ALT 51, AST 86, alk phos 473, total bilirubin 1.7.  Procalcitonin elevated at 52.98.    Review of Systems   Constitutional: Negative.  Negative for chills, diaphoresis, fatigue and fever.   HENT:  Positive for sinus pressure. Negative for sore throat and trouble swallowing.    Eyes: Negative.    Respiratory: Negative.  Negative for cough, chest tightness and shortness of breath.    Cardiovascular: Negative.  Negative for chest pain, palpitations and leg swelling.   Gastrointestinal:  Positive for abdominal distention (Improving with use of Lasix), abdominal pain (Epigastric, left upper quadrant) and diarrhea (Since December, improved with Lomotil). Negative for constipation, nausea and vomiting.   Genitourinary: Negative.  Negative for decreased urine volume, dysuria and flank pain.   Musculoskeletal:  Positive for gait problem (\"Staggering because maybe I am dehydrated\").   Skin: Negative.    Neurological:  Negative for dizziness, weakness and light-headedness.   Psychiatric/Behavioral: Negative.     All other systems reviewed and are negative.      Past Medical History:   Diagnosis Date    Asthma     Cancer     Cholangiocarcinoma    Hypertension     Migraines     Nausea & vomiting 06/13/2022       Allergies   Allergen Reactions    Cefdinir Unknown - High Severity    Cefuroxime Unknown - High Severity    Sulfa Antibiotics Hives    Cephalosporins Rash       Past Surgical History: "   Procedure Laterality Date    BREAST BIOPSY Left 1994    benign    CHOLECYSTECTOMY      COLONOSCOPY  05/22/2013    Diverticulosis, Hemorrhoids repeat exam in 5 years    COLONOSCOPY N/A 09/28/2021    Tics otherwise normal exam repeat in 7 years    COLONOSCOPY W/ POLYPECTOMY  07/26/2010    Hyperplastic polyp cecum, pan-diverticular disease repeat exam in 3 years    COMMON BILE DUCT EXPLORATION      For carcinoma of the bile duct    ENDOSCOPY  02/01/2016    Normal exam    WHIPPLE PROCEDURE  2011       Family History   Problem Relation Age of Onset    No Known Problems Mother     No Known Problems Father     Breast cancer Sister     Colon cancer Neg Hx     Colon polyps Neg Hx        Social History     Socioeconomic History    Marital status:    Tobacco Use    Smoking status: Former    Smokeless tobacco: Never   Substance and Sexual Activity    Alcohol use: Yes     Comment: Occasional    Sexual activity: Defer           Objective   Physical Exam  Vitals and nursing note reviewed.   Constitutional:       General: She is not in acute distress.     Appearance: Normal appearance. She is obese. She is not ill-appearing, toxic-appearing or diaphoretic.   HENT:      Head: Normocephalic.      Mouth/Throat:      Mouth: Mucous membranes are moist.      Pharynx: Oropharynx is clear.   Eyes:      General: No scleral icterus.     Conjunctiva/sclera: Conjunctivae normal.      Pupils: Pupils are equal, round, and reactive to light.   Cardiovascular:      Rate and Rhythm: Normal rate and regular rhythm.   Pulmonary:      Effort: Pulmonary effort is normal.      Breath sounds: Normal breath sounds.   Chest:      Chest wall: No tenderness.   Abdominal:      General: There is distension.      Tenderness: There is abdominal tenderness (Epigastric). There is no right CVA tenderness, left CVA tenderness or guarding.   Musculoskeletal:         General: Normal range of motion.      Cervical back: Neck supple.      Right lower leg: No  edema.      Left lower leg: No edema.   Skin:     General: Skin is warm.      Coloration: Skin is jaundiced.      Findings: No bruising.   Neurological:      Mental Status: She is alert and oriented to person, place, and time.      Gait: Gait normal.   Psychiatric:         Mood and Affect: Mood normal.         Behavior: Behavior normal.         Procedures           ED Course                                               Medical Decision Making  Problems Addressed:  ADRIEN (acute kidney injury): complicated acute illness or injury  Transaminitis: complicated acute illness or injury    Amount and/or Complexity of Data Reviewed  External Data Reviewed: labs, radiology and notes.  Labs: ordered. Decision-making details documented in ED Course.  Radiology: ordered. Decision-making details documented in ED Course.  ECG/medicine tests: ordered. Decision-making details documented in ED Course.  Discussion of management or test interpretation with external provider(s): Dr. Milton (PCP)    Risk  Prescription drug management.      Patient is a 78-year-old female presenting to ED with hypotension and concern for infection.  PMH significant for cholangiocarcinoma, hypertension, migraines, status post Whipple procedure, cholecystectomy.  Upon initial evaluation patient resting comfortably in bed well-appearing, nontoxic appearing.  Patient hypotensive at 88/44 with otherwise stable vital signs including afebrile status.  Workup revealed concern for sepsis with leukocytosis 20.38, CRP elevated at 13.3, sed rate 51, procalcitonin 49.49.  CBC otherwise with stable H&H, normal platelets and no further acute findings.  CMP with worsening ADRIEN with creatinine 1.5, BUN 35, GFR of 36.  LFTs continue to be elevated with ALT 50, AST 75, alk phos 492, total bilirubin 1.5.  Hyponatremia of 132, hypoglycemia of 55 with no further electrolyte disturbances including normal magnesium.  Low concern for pancreatic abnormality such as pancreatitis  with lipase of 5.  PT/INR 14.6/1.12.  Salicylate and acetaminophen negative.  Ammonia within normal limits at 30.  Low concern for ischemic process with normal lactic acid at 1.5.  Blood cultures were obtained.  Hepatitis panel revealed nonreactive hepatitis B, nonreactive hepatitis C antibodies while AMV are continue to be pending.  Urinalysis with trace leukocytes, 3-5 WBC, 1+ bacteria for which a culture was sent.  Respiratory panel remarkable for positive RSV and otherwise unremarkable. Chest x-ray showed: No acute disease.  Liver ultrasound showed: Status post cholecystectomy as well as Whipple procedure, suspected pneumobilia postoperative with foci of increased echogenicity within the biliary tree in the left lobe with dirty shadowing, no significant biliary dilation, no free fluid in the right upper quadrant.  CT imaging of the abdomen and pelvis showed: Status post Whipple procedure, pneumobilia as well as mild intrahepatic biliary dilation stable from previous exam, persistent soft tissue fullness in the region of the tomas hepatis some postoperative in nature, overall stable appearance from previous exam, prominent nodes within the small bowel mesentery and at the root of the mesentery also stable, no new mass or increasing lymphadenopathy, constipation, nonspecific fluid-filled bowel loops present without evidence of discrete transition point may be related to stasis from increased volume of fecal material, diverticulosis sigmoid colon without diverticulitis, nonobstructing right-sided nephrolithiasis, fat-containing periumbilical hernia, fat-containing inguinal hernia.  Patient was given IV fluids and had improvement in her blood pressures to the upper 90s/low 100s systolic.  Patient continued to remain in no acute distress throughout evaluation, afebrile, with no other vital sign abnormalities.  Patient was given fluid bolus.  Case was discussed with Dr. Mitlon, primary care provider, request that  patient be started with Zosyn and vancomycin and will kindly admit for admission under his services and further intervention.  Discussed with patient need for admission for which she is amenable with no further questions, concerns, needs at this time.    Differential diagnosis: Sepsis, urosepsis, urinary tract infection, COVID, influenza, RSV, human metapneumovirus, hepatitis, renal mass, liver mass, ADRIEN, electrolyte disturbances, abnormal pneumonia, pancreatitis, diverticulitis, diverticulosis, bronchitis, pneumonia, bronchiolitis, dilated, bile duct.      Final diagnoses:   ADRIEN (acute kidney injury)   Transaminitis   Sepsis with acute organ dysfunction, due to unspecified organism, unspecified organ dysfunction type, unspecified whether septic shock present       ED Disposition  ED Disposition       ED Disposition   Decision to Admit    Condition   --    Comment   Level of Care: Med/Surg [1]   Diagnosis: Sepsis [[id number]]   Admitting Physician: ERLINDA CASTILLO [[id number]]   Certification: I Certify That Inpatient Hospital Services Are Medically Necessary For Greater Than 2 Midnights                 No follow-up provider specified.       Medication List      No changes were made to your prescriptions during this visit.            Jagdish Santos PA-C  02/08/24 1937

## 2024-02-09 PROBLEM — B33.8 RSV INFECTION: Status: ACTIVE | Noted: 2024-02-09

## 2024-02-09 PROBLEM — C22.1 CHOLANGIOCARCINOMA: Status: ACTIVE | Noted: 2024-02-09

## 2024-02-09 PROBLEM — N17.9 AKI (ACUTE KIDNEY INJURY): Status: ACTIVE | Noted: 2024-02-09

## 2024-02-09 LAB
ALBUMIN SERPL-MCNC: 3.1 G/DL (ref 3.5–5.2)
ALBUMIN/GLOB SERPL: 1 G/DL
ALP SERPL-CCNC: 426 U/L (ref 39–117)
ALT SERPL W P-5'-P-CCNC: 40 U/L (ref 1–33)
ANION GAP SERPL CALCULATED.3IONS-SCNC: 11 MMOL/L (ref 5–15)
AST SERPL-CCNC: 53 U/L (ref 1–32)
BASOPHILS # BLD AUTO: 0.02 10*3/MM3 (ref 0–0.2)
BASOPHILS NFR BLD AUTO: 0.2 % (ref 0–1.5)
BILIRUB SERPL-MCNC: 1.2 MG/DL (ref 0–1.2)
BUN SERPL-MCNC: 26 MG/DL (ref 8–23)
BUN/CREAT SERPL: 32.9 (ref 7–25)
CALCIUM SPEC-SCNC: 8.4 MG/DL (ref 8.6–10.5)
CANCER AG19-9 SERPL-ACNC: 291 U/ML
CEA SERPL-MCNC: 7.78 NG/ML
CHLORIDE SERPL-SCNC: 105 MMOL/L (ref 98–107)
CO2 SERPL-SCNC: 20 MMOL/L (ref 22–29)
CREAT SERPL-MCNC: 0.79 MG/DL (ref 0.57–1)
DEPRECATED RDW RBC AUTO: 51.8 FL (ref 37–54)
EGFRCR SERPLBLD CKD-EPI 2021: 77.6 ML/MIN/1.73
EOSINOPHIL # BLD AUTO: 0.03 10*3/MM3 (ref 0–0.4)
EOSINOPHIL NFR BLD AUTO: 0.2 % (ref 0.3–6.2)
ERYTHROCYTE [DISTWIDTH] IN BLOOD BY AUTOMATED COUNT: 14.9 % (ref 12.3–15.4)
GLOBULIN UR ELPH-MCNC: 3.1 GM/DL
GLUCOSE SERPL-MCNC: 97 MG/DL (ref 65–99)
HCT VFR BLD AUTO: 32.2 % (ref 34–46.6)
HGB BLD-MCNC: 11.1 G/DL (ref 12–15.9)
IMM GRANULOCYTES # BLD AUTO: 0.11 10*3/MM3 (ref 0–0.05)
IMM GRANULOCYTES NFR BLD AUTO: 0.9 % (ref 0–0.5)
LYMPHOCYTES # BLD AUTO: 1.03 10*3/MM3 (ref 0.7–3.1)
LYMPHOCYTES NFR BLD AUTO: 8.5 % (ref 19.6–45.3)
MCH RBC QN AUTO: 32.5 PG (ref 26.6–33)
MCHC RBC AUTO-ENTMCNC: 34.5 G/DL (ref 31.5–35.7)
MCV RBC AUTO: 94.2 FL (ref 79–97)
MONOCYTES # BLD AUTO: 1.23 10*3/MM3 (ref 0.1–0.9)
MONOCYTES NFR BLD AUTO: 10.1 % (ref 5–12)
NEUTROPHILS NFR BLD AUTO: 80.1 % (ref 42.7–76)
NEUTROPHILS NFR BLD AUTO: 9.73 10*3/MM3 (ref 1.7–7)
NRBC BLD AUTO-RTO: 0 /100 WBC (ref 0–0.2)
PLATELET # BLD AUTO: 144 10*3/MM3 (ref 140–450)
PMV BLD AUTO: 12.1 FL (ref 6–12)
POTASSIUM SERPL-SCNC: 4.1 MMOL/L (ref 3.5–5.2)
PROT SERPL-MCNC: 6.2 G/DL (ref 6–8.5)
RBC # BLD AUTO: 3.42 10*6/MM3 (ref 3.77–5.28)
SODIUM SERPL-SCNC: 136 MMOL/L (ref 136–145)
WBC NRBC COR # BLD AUTO: 12.15 10*3/MM3 (ref 3.4–10.8)

## 2024-02-09 PROCEDURE — 25010000002 ENOXAPARIN PER 10 MG: Performed by: FAMILY MEDICINE

## 2024-02-09 PROCEDURE — 25010000002 PIPERACILLIN SOD-TAZOBACTAM PER 1 G: Performed by: FAMILY MEDICINE

## 2024-02-09 PROCEDURE — 80053 COMPREHEN METABOLIC PANEL: CPT | Performed by: FAMILY MEDICINE

## 2024-02-09 PROCEDURE — 85025 COMPLETE CBC W/AUTO DIFF WBC: CPT | Performed by: FAMILY MEDICINE

## 2024-02-09 PROCEDURE — 99222 1ST HOSP IP/OBS MODERATE 55: CPT | Performed by: INTERNAL MEDICINE

## 2024-02-09 RX ORDER — FUROSEMIDE 20 MG/1
20 TABLET ORAL DAILY
COMMUNITY

## 2024-02-09 RX ORDER — SPIRONOLACTONE 50 MG/1
50 TABLET, FILM COATED ORAL DAILY
COMMUNITY

## 2024-02-09 RX ORDER — OXYCODONE AND ACETAMINOPHEN 10; 325 MG/1; MG/1
1 TABLET ORAL EVERY 6 HOURS PRN
COMMUNITY

## 2024-02-09 RX ORDER — ERGOCALCIFEROL 1.25 MG/1
50000 CAPSULE ORAL WEEKLY
COMMUNITY

## 2024-02-09 RX ADMIN — PIPERACILLIN SODIUM AND TAZOBACTAM SODIUM 3.38 G: 3; .375 INJECTION, POWDER, LYOPHILIZED, FOR SOLUTION INTRAVENOUS at 11:12

## 2024-02-09 RX ADMIN — PIPERACILLIN SODIUM AND TAZOBACTAM SODIUM 3.38 G: 3; .375 INJECTION, POWDER, LYOPHILIZED, FOR SOLUTION INTRAVENOUS at 18:21

## 2024-02-09 RX ADMIN — PIPERACILLIN SODIUM AND TAZOBACTAM SODIUM 3.38 G: 3; .375 INJECTION, POWDER, LYOPHILIZED, FOR SOLUTION INTRAVENOUS at 03:14

## 2024-02-09 RX ADMIN — ENOXAPARIN SODIUM 30 MG: 100 INJECTION SUBCUTANEOUS at 09:41

## 2024-02-09 NOTE — CASE MANAGEMENT/SOCIAL WORK
Discharge Planning Assessment  [facility]     Patient Name: Candice Kingsley  MRN: [id number]  Today's Date: 2/9/2024    Admit Date: 2/8/2024        Discharge Needs Assessment       Row Name 02/09/24 1026       Living Environment    People in Home spouse    Name(s) of People in Home Christiano    Current Living Arrangements home    Primary Care Provided by self    Provides Primary Care For no one    Family Caregiver if Needed spouse    Able to Return to Prior Arrangements yes       Resource/Environmental Concerns    Resource/Environmental Concerns none       Transition Planning    Patient/Family Anticipates Transition to home with family    Transportation Anticipated family or friend will provide       Discharge Needs Assessment    Readmission Within the Last 30 Days no previous admission in last 30 days    Equipment Currently Used at Home none    Concerns to be Addressed no discharge needs identified    Equipment Needed After Discharge none    Discharge Coordination/Progress spoke to patient who lives with spouse and works so is independent at home prior to illness; has RX coverage and PCP; will follow for DC needs                   Discharge Plan    No documentation.                 Continued Care and Services - Admitted Since 2/8/2024    Coordination has not been started for this encounter.          Demographic Summary    No documentation.                  Functional Status    No documentation.                  Psychosocial    No documentation.                  Abuse/Neglect    No documentation.                  Legal    No documentation.                  Substance Abuse    No documentation.                  Patient Forms    No documentation.                     Hali Mccoy RN

## 2024-02-09 NOTE — CONSULTS
MEDICAL ONCOLOGY CONSULTATION    Pt Name: Candice Kingsley  MRN: [id number]  [id number]  YOB: 1945  Date of evaluation: 2/9/2024    Reason for Consultation: History of cholangiocarcinoma in remission, patient seen in continuity of care with new symptoms and leukocytosis    Requesting Physician: Dr. Milton    History Obtained From: The patient, conference with Dr. Cee, [facility]     HISTORY OF PRESENT ILLNESS:      Candice Kingsley is a 78-year-old  female with a remote history of cholangiocarcinoma treated with a Whipple procedure by Dr. Lucas Andrews in 2011, followed by adjuvant radiation therapy (Dr. Jack Schreiber) and concurrent radiosensitizing chemotherapy with Xeloda (Dr. Dashawn Nix).   She has had very irregular monitoring and follow-up both here in [place] and in [place] without evidence of recurrent disease.  Recent workup in the last couple of months includes CT imaging, EGD with EUS and sampling of peritoneal fluid, PET scan have failed to show evidence of new disease.  She does have a persistently elevated CEA and CA 19-9, but without evidence of new disease.  Candice was evaluated in the ED at [facility] on 2/8/2024 due to increasing fatigue, weakness, sinus symptoms, abdominal discomfort, leukocytosis with a WBC of 23.6 and a left shift, elevated CRP of 13.3 and a procalcitonin of 49.49.  A respiratory panel by PCR documented RSV.  She was admitted for IV antibiotics with Zosyn, IV fluids.  Medical oncology consultation requested and continuity of care.        DETAILED TUMOR HISTORY COPIED FROM DR. CEE'S OFFICE RECORDS    Diagnosis  Cholangiocarcinoma, 2011     Treatment Summary  2011 Whipple by Dr Lucas Andrews with surgical oncology at [facility]   2011 Radiation therapy with Xeloda/Capecitabine.      Cancer History  Candice Kingsley was referred by Dr Tripp Beckham with [facility] with possible diagnosis of recurrent cholangiocarcinoma status post Whipple  procedure by Dr Lucas Andrews with surgical oncology at [facility] followed by radiation therapy and Xeloda/Capecitabine. She did not have any neoadjuvant chemotherapy.   2/12/14 CT chest ([facility]): No acute abnormality is seen within the chest.   2/12/14 CT abd/pelvis ([facility]): Common bile duct stents are present. Intrahepatic bile duct dilation is more prominent now than what was present on the exam from 2011. Prominent fecal material within the distal half of the colon.   1/5/17 [facility] labs: CA 19-9 266, CEA 3.2  1/27/17 [facility] labs: CA 19-9 54, CEA 3.47  2/15/17 [facility] labs: CA 19-9 62 CEA 3.43  6/29/18 [facility] labs: CA 19-9 59  12/9/21 MRI abdomen ([facility]): Susceptibility artifact associated with the bile duct stent at the site of the choledochojejunostomy. Post Whipple surgical changes. There is mild intrahepatic bile duct distention with chronic distention of the pancreatic duct. No discrete obstructing mass localized. Moderate fecal stasis.   6/13/22 CT abd/pelvis ([facility]): There is thickening of the gastric wall involving the mid to distal body and antrum. The findings are worrisome for gastritis. Other etiologies are felt to be less likely. Prior Whipple procedure with pancreatic head resection. Mildly   dilated pancreatic duct is stable. There is a biliary stent with air in the stent and some air within the biliary tree. There are radiopaque densities in the liver likely related to prior embolic therapy. Wall thickening of the descending colon, sigmoid colon and rectum   suggesting colitis. No small bowel distention is seen. Prominent left gonadal vein and pelvic veins. The finding is stable. Small nonobstructive renal stone. Small fat-containing left inguinal hernia. Degenerative changes of the spine and hips left greater than right.   12/13/23 CT abd/pelvis ([facility]): Overall findings concerning for disease recurrence or progression.  Moderate intrahepatic and extrahepatic dilation, previously mild on 6/13/2022. Increased  conspicuity and fullness of the operative bed with focal vague density in an area that was previously fat density.  A few new or enlarged mesenteric lymph nodes as detailed above. Haziness of the fat in the RIGHT paracolic gutter. New small fluid in the pelvis. Prominent inferior heart size. Calcified atherosclerosis. Nonobstructing RIGHT upper pole calculus. No hydronephrosis or ureteral calculus.   12/20/23 Tumor Markers: CA 19-9 166 CEA 9.9.   12/20/23 EGD with EUS by Dr Tripp Beckham/[facility]: The celiac axis and associated vascular structures was identified and examined. No concerning or malignant lymphadenopathy was identified. Limited views of the left lobe of the liver revealed no obvious biliary dilation or focal hepatic mass.  There was evidence of some perihepatic ascites. The EUS scope was advanced to the distal stomach and just distal to the gastroenteric anastomosis.  I was unable to identify any abnormal tissue that would correspond to the fullness or findings on her CT scan.  No concerning lymphadenopathy was identified.  However, typical EUS evaluation was likely limited due to her postsurgical anatomy. Due to the clinical concern for possible recurrent malignancy, I elected to attempt aspiration of ascitic fluid.  Using a transgastric approach and a 22-gauge SlimLine needle, a small amount of ascitic fluid was aspirated.  Likely due to the small size of the pocket, and large amount of fluid aspiration was not achieved. Aspirated fluid and tissue was sent for cytology.   12/20/23  Peritoneal fluid, ThinPrep: Benign mesothelial cells and occasional small round lymphocytes.   12/20/23 Post-op EGD with EUS recommendations by Dr Tripp Beckham/[facility] who recommended to check CEA/CA 19-9, follow-up cytology results, refer to [facility] and order PET scan. If further biopsy and/or tissue is needed for treatment decisions, patient may require referral to interventional radiology or surgical oncology at a  tertiary care center.  1/4/2024-reviewed results of imaging studies as well as cytology from peritoneal fluid.  No cytologic evidence of malignancy.  However, elevated CA 19-9 and CEA are concerning for malignancy.  I will go ahead and order a PET scan for further diagnostic workup.  1/4/24 Tumor Markers: CEA 12.2 CA 19-9 198  1/5/24 Consult with Dr. Kendall Garza; she has a couple of small lymph nodes centrally located that are pathologic in size. As it stands right now I see no clear target lesion to go after. I will see the patient if needed.   1/12/24 PET CT Skull Base to Mid Thigh: The patient has a history of cholangiocarcinoma, post Whipple procedure, biliary stent and hepatic embolization. There is an area of slight  increased metabolic activity in the subcapsular right hepatic lobe, with an SUV max of 3.0, with the remaining hepatic parenchyma having an average SUV max of 2.7.  This is a very nonspecific finding and does not necessarily indicate recurrent or residual disease. Outside of this finding, no additional sites are suggested for recurrent or residual disease. 0.7 cm left lower lobe nodule without significant metabolic activity. Non obstructing right nephrolithiasis.  1/17/2024-I reviewed results of PET scan with the patient.  I discussed care plan with Dr. Kendall Garza, surgical oncology.  I do not see any clear-cut evidence of disease progression.            Past Medical History:    Past Medical History:   Diagnosis Date    Asthma     Cancer     Cholangiocarcinoma    Hypertension     Migraines     Nausea & vomiting 06/13/2022       Past Surgical History:    Past Surgical History:   Procedure Laterality Date    BREAST BIOPSY Left 1994    benign    CHOLECYSTECTOMY      COLONOSCOPY  05/22/2013    Diverticulosis, Hemorrhoids repeat exam in 5 years    COLONOSCOPY N/A 09/28/2021    Tics otherwise normal exam repeat in 7 years    COLONOSCOPY W/ POLYPECTOMY  07/26/2010    Hyperplastic polyp cecum,  pan-diverticular disease repeat exam in 3 years    COMMON BILE DUCT EXPLORATION      For carcinoma of the bile duct    ENDOSCOPY  02/01/2016    Normal exam    WHIPPLE PROCEDURE  2011       Current Hospital Medications:      Current Facility-Administered Medications:     sennosides-docusate (PERICOLACE) 8.6-50 MG per tablet 2 tablet, 2 tablet, Oral, BID PRN **AND** polyethylene glycol (MIRALAX) packet 17 g, 17 g, Oral, Daily PRN **AND** bisacodyl (DULCOLAX) EC tablet 5 mg, 5 mg, Oral, Daily PRN **AND** bisacodyl (DULCOLAX) suppository 10 mg, 10 mg, Rectal, Daily PRN, Yoan Milton MD    Calcium Replacement - Follow Nurse / BPA Driven Protocol, , Does not apply, PRN, Yoan Milton MD    Enoxaparin Sodium (LOVENOX) syringe 30 mg, 30 mg, Subcutaneous, Daily, Yoan Milton MD    HYDROcodone-acetaminophen (NORCO) 5-325 MG per tablet 1 tablet, 1 tablet, Oral, Q4H PRN, Yoan Milton MD    LORazepam (ATIVAN) tablet 1 mg, 1 mg, Oral, Q8H PRN, Yoan Milton MD    Magnesium Standard Dose Replacement - Follow Nurse / BPA Driven Protocol, , Does not apply, PRN, Yoan Milton MD    ondansetron (ZOFRAN) injection 4 mg, 4 mg, Intravenous, Q6H PRN, Yoan Milton MD    Pharmacy Consult - Pharmacy to dose, , Does not apply, Continuous PRN, Yoan Milton MD    Phosphorus Replacement - Follow Nurse / BPA Driven Protocol, , Does not apply, PRN, Yoan Milton MD    piperacillin-tazobactam (ZOSYN) 3.375 g IVPB in 100 mL NS MBP (CD), 3.375 g, Intravenous, Q8H, Yoan Milton MD, 3.375 g at 02/09/24 0314    Potassium Replacement - Follow Nurse / BPA Driven Protocol, , Does not apply, PRN, Yoan Milton MD    [COMPLETED] Insert Peripheral IV, , , Once **AND** sodium chloride 0.9 % flush 10 mL, 10 mL, Intravenous, PRN, Yoan Milton MD    sodium chloride 0.9 % flush 10 mL, 10 mL, Intravenous, Q12H, Yoan Milton MD, 10 mL at 02/08/24 7044    sodium  chloride 0.9 % flush 10 mL, 10 mL, Intravenous, PRN, Yoan Milton MD    sodium chloride 0.9 % infusion 40 mL, 40 mL, Intravenous, PRN, Yoan Milton MD    [COMPLETED] vancomycin (VANCOCIN) 1,000 mg in sodium chloride 0.9 % 250 mL IVPB-VTB, 1,000 mg, Intravenous, Once, Last Rate: 250 mL/hr at 02/08/24 2247, 1,000 mg at 02/08/24 2247 **FOLLOWED BY** vancomycin 750 mg/250 mL 0.9% NS IVPB ([facility]), 750 mg, Intravenous, Q24H, Yoan Milton MD    Allergies:   Allergies   Allergen Reactions    Cefdinir Unknown - High Severity    Cefuroxime Unknown - High Severity    Sulfa Antibiotics Hives    Cephalosporins Rash       Social History:    Social History     Socioeconomic History    Marital status:    Tobacco Use    Smoking status: Former    Smokeless tobacco: Never   Vaping Use    Vaping Use: Never used   Substance and Sexual Activity    Alcohol use: Yes     Comment: Occasional    Drug use: Never    Sexual activity: Defer       Family History:   Family History   Problem Relation Age of Onset    No Known Problems Mother     No Known Problems Father     Breast cancer Sister     Colon cancer Neg Hx     Colon polyps Neg Hx        REVIEW OF SYSTEMS:    Constitutional: Weakness fatigue and malaise  HEENT: no blurring of vision, no double vision, no hearing difficulty, no tinnitus,no ulceration, no dental caries, no dysphagi sinus symptoms     Lungs: no cough, no shortness of breath, no wheeze  CVS: no palpitation, no chest pain, no shortness of breath  GI: no abdominal pain, no nausea , no vomiting, no constipation  JESSICA: no dysuria, frequency and urgency, no hematuria, no kidney stones  Musculoskeletal: no joint pain, swelling , stiffness  Endocrine: no polyuria, polydipsia, no cold or heat intolerance  Hematology: no anemia, no easy brusing or bleeding, no hx of clotting disorder  Dermatology: no skin rash, no eczema, no pruritus  Psychiatry: no depression, no anxiety,no panic attacks, no suicide  "ideation  Neurology: no syncope, no seizures, no numbness or tingling of hands, no numbness or tingling of feet, no paresis    Vitals:    /58 (BP Location: Left arm, Patient Position: Lying)   Pulse 58   Temp 97.9 °F (36.6 °C) (Oral)   Resp 16   Ht 157.5 cm (62\")   Wt 49.9 kg (110 lb)   SpO2 95%   BMI 20.12 kg/m²     PHYSICAL EXAM:    CONSTITUTIONAL: Alert, appropriate, no acute distress  EYES: Nonicteric, EOM intact, pupils equal round   ENT: Mucous membranes moist, no oropharyngeal lesions   NECK: Supple, no masses   CHEST/LUNGS: Lungs to auscultation are remarkably clear anteriorly and posteriorly without rales or wheezing  CARDIOVASCULAR: RRR, no murmurs  ABDOMEN: soft non-tender, active bowel sounds, no HSM  EXTREMITIES: warm, moves all fours  SKIN: warm, dry with no rashes or lesions  LYMPH: No cervical, clavicular, axillary, or inguinal lymphadenopathy  NEUROLOGIC: follows commands, non focal   PSYCH: mood and affect appropriate    CBC  Results from last 7 days   Lab Units 02/09/24  0451 02/08/24  1743 02/08/24  1237   WBC 10*3/mm3 12.15* 20.38* 23.60*   HEMOGLOBIN g/dL 11.1* 11.7* 11.7*   HEMATOCRIT % 32.2* 35.3 35.2   PLATELETS 10*3/mm3 144 152 149         Lab Results   Component Value Date     02/09/2024    K 4.1 02/09/2024     02/09/2024    CO2 20.0 (L) 02/09/2024    BUN 26 (H) 02/09/2024    CREATININE 0.79 02/09/2024    GLUCOSE 97 02/09/2024    CALCIUM 8.4 (L) 02/09/2024    BILITOT 1.2 02/09/2024    ALKPHOS 426 (H) 02/09/2024    AST 53 (H) 02/09/2024    ALT 40 (H) 02/09/2024    AGRATIO 1.0 02/09/2024    GLOB 3.1 02/09/2024       Lab Results   Component Value Date    INR 1.12 (H) 02/08/2024    INR 1.13 (H) 06/13/2022    PROTIME 14.6 02/08/2024    PROTIME 14.1 06/13/2022       Cultures:    No results found for: \"BLOODCX\"  No components found for: \"URINCX\"    ASSESSMENT/PLAN:    Candice was interviewed, she was seen and examined and all records reviewed and examined.  Case was also " discussed with Dr. Cee and hospital nursing staff.  Her lungs are remarkably clear heart is regular normal S1 and S2.  Abdomen is relatively benign.  She does have sinusitis.    #1  History of cholangiocarcinoma-in remission    Candice Kingsley is a 78-year-old  female with a remote history of cholangiocarcinoma treated with a Whipple procedure by Dr. Lucas Andrews in 2011, followed by adjuvant radiation therapy (Dr. Jack Schreiber) and concurrent radiosensitizing chemotherapy with Xeloda (Dr. Dashawn Nix).   She has had very irregular monitoring and follow-up both here in [place] and in [place] without evidence of recurrent disease.  Recent workup in the last couple of months includes CT imaging, EGD with EUS and sampling of peritoneal fluid, PET scan have failed to show evidence of new disease.    She does have a persistently elevated CEA and CA 19-9, but without evidence of new disease.    CT scan of the abdomen and pelvis at [facility] with contrast on 2/8/2024  IMPRESSION:  1. Status post Whipple procedure. There is pneumobilia as well as mild  intrahepatic biliary dilatation stable from the previous exam. There is  persistent soft tissue fullness in the region of the tomas hepatis some  of which is postoperative in nature. Overall stable appearance from the  previous exam. There are again some prominent nodes within the small  bowel mesentery and at the root of the mesentery also stable. No new  mass or increasing lymphadenopathy.  2. Constipation. Nonspecific fluid-filled bowel loops are present  without evidence of a discrete transition point. This may be related to  stasis from the increased volume of fecal material within the colon.  There is diverticulosis of the sigmoid colon without diverticulitis.  3. Nonobstructing right-sided nephrolithiasis. No ureteral stone or  obstructive uropathy.  4. Fat-containing periumbilical hernia.  5. Fat-containing left inguinal hernia.  6. The uterus and  adnexa are unremarkable..    I will recheck tumor markers while hospitalized  Thanks for alerting us to Ms. Kingsley's hospitalization.    #2  Leukocytosis    Candice was evaluated in the ED at [facility] on 2/8/2024 due to increasing fatigue, weakness, sinus symptoms, abdominal discomfort, leukocytosis with a WBC of 23.6 and a left shift, elevated CRP of 13.3 and a procalcitonin of 49.49.  LFTs on 2/8/2024 were abnormal with an alkaline phosphatase of 473, SGOT of 86, ALT of 51 with a total bilirubin of 1.7.  A respiratory panel by PCR documented RSV.  She was admitted for IV antibiotics with Zosyn, IV fluids.  Agree with continuation of IV antibiotics.    CBC this morning, 2/9/2024 has a WBC of 12.15, hemoglobin 11.1 and a platelet count of 144,000.  CBC is improved, likely secondary to IV antibiotics.  Agree with continuation of IV antibiotics, she could have an abdominal process that is being treated in addition to the RSV and sinusitis.      Kendall Oswald MD    02/09/24  06:23 CST

## 2024-02-09 NOTE — PLAN OF CARE
Goal Outcome Evaluation:  Plan of Care Reviewed With: patient        Progress: no change                                  Patient denies pain. IV ABX infusing per order. Pt's AC IV moved to R upper arm per pt's request. Voiding. VSS. No distress noted.

## 2024-02-09 NOTE — H&P
History and Physical    Patient:  Candice Kingsley  MRN: [id number]    CHIEF COMPLAINT: Weakness    History Obtained From: the patient   PCP: Yoan Milton MD    HISTORY OF PRESENT ILLNESS:   The patient is a 78 y.o. 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  I had seen her the week prior as a new patient and treated for an upper respiratory infection.  She developed worsening ascites and she was placed on low-dose of spironolactone and Lasix.  This was a new problem for her.  There has been some concern about her cholangiocarcinoma recurrence given elevated tumor markers but recent imaging and biopsy were negative for any changes.  In the office she was found to be severely hypotensive but she declined going to the ER.  Instead, stat labs were obtained which showed grossly elevated white blood cell count and procalcitonin concerning for sepsis.  She had no peritoneal tenderness.  She was directed to the ER for further workup and admission for sepsis.  She denies any recent fever or chills.    REVIEW OF SYSTEMS:    Review of Systems   Constitutional:  Negative for chills and fever.   HENT:  Negative for congestion and sore throat.    Respiratory:  Positive for cough. Negative for shortness of breath.    Cardiovascular:  Negative for chest pain and leg swelling.   Gastrointestinal:  Positive for abdominal distention. Negative for abdominal pain, diarrhea and nausea.   Genitourinary:  Negative for dysuria and urgency.   Musculoskeletal:  Negative for back pain and myalgias.   Neurological:  Negative for dizziness and numbness.          Past Medical History:  Past Medical History:   Diagnosis Date    Asthma     Cancer     Cholangiocarcinoma    Hypertension     Migraines     Nausea & vomiting 06/13/2022       Past Surgical History:  Past Surgical History:   Procedure Laterality Date     BREAST BIOPSY Left 1994    benign    CHOLECYSTECTOMY      COLONOSCOPY  05/22/2013    Diverticulosis, Hemorrhoids repeat exam in 5 years    COLONOSCOPY N/A 09/28/2021    Tics otherwise normal exam repeat in 7 years    COLONOSCOPY W/ POLYPECTOMY  07/26/2010    Hyperplastic polyp cecum, pan-diverticular disease repeat exam in 3 years    COMMON BILE DUCT EXPLORATION      For carcinoma of the bile duct    ENDOSCOPY  02/01/2016    Normal exam    WHIPPLE PROCEDURE  2011       Medications Prior to Admission:    Medications Prior to Admission   Medication Sig Dispense Refill Last Dose    butalbital-acetaminophen-caffeine (FIORICET, ESGIC) -40 MG per tablet Take 1 tablet by mouth 2 (Two) Times a Day.  0     cyanocobalamin 1000 MCG/ML injection INJECT 1 ML ONCE A MONTH  2     diphenoxylate-atropine (LOMOTIL) 2.5-0.025 MG per tablet Take 1 tablet by mouth 4 (Four) Times a Day As Needed for Diarrhea.       lisinopril (PRINIVIL,ZESTRIL) 10 MG tablet        LORazepam (ATIVAN) 1 MG tablet Take 1 tablet by mouth Every 8 (Eight) Hours As Needed.       meloxicam (MOBIC) 7.5 MG tablet        promethazine (PHENERGAN) 25 MG tablet Take 1 tablet by mouth Every 6 (Six) Hours As Needed for Nausea or Vomiting.          Allergies:  Cefdinir, Cefuroxime, Sulfa antibiotics, and Cephalosporins    Social History:   Social History     Socioeconomic History    Marital status:    Tobacco Use    Smoking status: Former    Smokeless tobacco: Never   Vaping Use    Vaping Use: Never used   Substance and Sexual Activity    Alcohol use: Yes     Comment: Occasional    Drug use: Never    Sexual activity: Defer       Family History:   Family History   Problem Relation Age of Onset    No Known Problems Mother     No Known Problems Father     Breast cancer Sister     Colon cancer Neg Hx     Colon polyps Neg Hx            Physical Exam:    Vitals: /58 (BP Location: Left arm, Patient Position: Lying)   Pulse 58   Temp 97.9 °F (36.6 °C) (Oral)  "  Resp 16   Ht 157.5 cm (62\")   Wt 49.9 kg (110 lb)   SpO2 95%   BMI 20.12 kg/m²   Physical Exam  Constitutional:       Appearance: She is well-developed. She is ill-appearing.   HENT:      Head: Normocephalic and atraumatic.   Eyes:      Conjunctiva/sclera: Conjunctivae normal.      Pupils: Pupils are equal, round, and reactive to light.   Cardiovascular:      Rate and Rhythm: Normal rate and regular rhythm.      Heart sounds: Normal heart sounds. No murmur heard.     No friction rub.   Pulmonary:      Effort: Pulmonary effort is normal. No respiratory distress.      Breath sounds: Normal breath sounds. No wheezing or rales.   Abdominal:      General: Bowel sounds are normal.      Palpations: Abdomen is soft.      Tenderness: There is no abdominal tenderness.      Comments: Trace ascites, improved from prior exam   Musculoskeletal:         General: Normal range of motion.      Cervical back: Normal range of motion.   Skin:     Capillary Refill: Capillary refill takes less than 2 seconds.   Neurological:      Mental Status: She is alert and oriented to person, place, and time.      Cranial Nerves: No cranial nerve deficit.   Psychiatric:         Behavior: Behavior normal.           Lab Results (last 24 hours)       Procedure Component Value Units Date/Time    Comprehensive Metabolic Panel [[id number]]  (Abnormal) Collected: 02/09/24 0451    Specimen: Blood Updated: 02/09/24 0603     Glucose 97 mg/dL      BUN 26 mg/dL      Creatinine 0.79 mg/dL      Sodium 136 mmol/L      Potassium 4.1 mmol/L      Chloride 105 mmol/L      CO2 20.0 mmol/L      Calcium 8.4 mg/dL      Total Protein 6.2 g/dL      Albumin 3.1 g/dL      ALT (SGPT) 40 U/L      AST (SGOT) 53 U/L      Alkaline Phosphatase 426 U/L      Total Bilirubin 1.2 mg/dL      Globulin 3.1 gm/dL      A/G Ratio 1.0 g/dL      BUN/Creatinine Ratio 32.9     Anion Gap 11.0 mmol/L      eGFR 77.6 mL/min/1.73     Narrative:      GFR Normal >60  Chronic Kidney Disease " <60  Kidney Failure <15    The GFR formula is only valid for adults with stable renal function between ages 18 and 70.    CBC & Differential [[id number]]  (Abnormal) Collected: 02/09/24 0451    Specimen: Blood Updated: 02/09/24 0540    Narrative:      The following orders were created for panel order CBC & Differential.  Procedure                               Abnormality         Status                     ---------                               -----------         ------                     CBC Auto Differential[[id number]]        Abnormal            Final result                 Please view results for these tests on the individual orders.    CBC Auto Differential [[id number]]  (Abnormal) Collected: 02/09/24 0451    Specimen: Blood Updated: 02/09/24 0540     WBC 12.15 10*3/mm3      RBC 3.42 10*6/mm3      Hemoglobin 11.1 g/dL      Hematocrit 32.2 %      MCV 94.2 fL      MCH 32.5 pg      MCHC 34.5 g/dL      RDW 14.9 %      RDW-SD 51.8 fl      MPV 12.1 fL      Platelets 144 10*3/mm3      Neutrophil % 80.1 %      Lymphocyte % 8.5 %      Monocyte % 10.1 %      Eosinophil % 0.2 %      Basophil % 0.2 %      Immature Grans % 0.9 %      Neutrophils, Absolute 9.73 10*3/mm3      Lymphocytes, Absolute 1.03 10*3/mm3      Monocytes, Absolute 1.23 10*3/mm3      Eosinophils, Absolute 0.03 10*3/mm3      Basophils, Absolute 0.02 10*3/mm3      Immature Grans, Absolute 0.11 10*3/mm3      nRBC 0.0 /100 WBC     Hepatitis Panel, Acute [[id number]]  (Normal) Collected: 02/08/24 1743    Specimen: Blood Updated: 02/08/24 2250     Hepatitis B Surface Ag Non-Reactive     Hep A IgM Non-Reactive     Hep B C IgM Non-Reactive     Hepatitis C Ab Non-Reactive    Narrative:      Results may be falsely decreased if patient taking Biotin.     Respiratory Panel PCR w/COVID-19(SARS-CoV-2) [facility]ATIANNA/YOANA/[facility]IN[facility]/PAD/[facility]ALES In-House, NP Swab in UTM/[facility], 2 HR TAT - Swab, Nasopharynx [[id number]]  (Abnormal) Collected: 02/08/24 1745    Specimen: Swab from Nasopharynx  Updated: 02/08/24 1902     ADENOVIRUS, PCR Not Detected     Coronavirus 229E Not Detected     Coronavirus HKU1 Not Detected     Coronavirus NL63 Not Detected     Coronavirus OC43 Not Detected     COVID19 Not Detected     Human Metapneumovirus Not Detected     Human Rhinovirus/Enterovirus Not Detected     Influenza A PCR Not Detected     Influenza B PCR Not Detected     Parainfluenza Virus 1 Not Detected     Parainfluenza Virus 2 Not Detected     Parainfluenza Virus 3 Not Detected     Parainfluenza Virus 4 Not Detected     RSV, PCR Detected     Bordetella pertussis pcr Not Detected     Bordetella parapertussis PCR Not Detected     Chlamydophila pneumoniae PCR Not Detected     Mycoplasma pneumo by PCR Not Detected    Narrative:      In the setting of a positive respiratory panel with a viral infection PLUS a negative procalcitonin without other underlying concern for bacterial infection, consider observing off antibiotics or discontinuation of antibiotics and continue supportive care. If the respiratory panel is positive for atypical bacterial infection (Bordetella pertussis, Chlamydophila pneumoniae, or Mycoplasma pneumoniae), consider antibiotic de-escalation to target atypical bacterial infection.    Procalcitonin [[id number]]  (Abnormal) Collected: 02/08/24 1743    Specimen: Blood Updated: 02/08/24 1820     Procalcitonin 49.49 ng/mL     Narrative:      As a Marker for Sepsis (Non-Neonates):    1. <0.5 ng/mL represents a low risk of severe sepsis and/or septic shock.  2. >2 ng/mL represents a high risk of severe sepsis and/or septic shock.    As a Marker for Lower Respiratory Tract Infections that require antibiotic therapy:    PCT on Admission    Antibiotic Therapy       6-12 Hrs later    >0.5                Strongly Recommended  >0.25 - <0.5        Recommended   0.1 - 0.25          Discouraged              Remeasure/reassess PCT  <0.1                Strongly Discouraged     Remeasure/reassess PCT    As 28 day  "mortality risk marker: \"Change in Procalcitonin Result\" (>80% or <=80%) if Day 0 (or Day 1) and Day 4 values are available. Refer to http://[email or web address]-pct-[email or web address]    Change in PCT <=80%  A decrease of PCT levels below or equal to 80% defines a positive change in PCT test result representing a higher risk for 28-day all-cause mortality of patients diagnosed with severe sepsis for septic shock.    Change in PCT >80%  A decrease of PCT levels of more than 80% defines a negative change in PCT result representing a lower risk for 28-day all-cause mortality of patients diagnosed with severe sepsis or septic shock.       Sedimentation Rate [[id number]]  (Abnormal) Collected: 02/08/24 1743    Specimen: Blood Updated: 02/08/24 1818     Sed Rate 51 mm/hr     C-reactive Protein [[id number]]  (Abnormal) Collected: 02/08/24 1743    Specimen: Blood Updated: 02/08/24 1816     C-Reactive Protein 13.30 mg/dL     Salicylate Level [[id number]]  (Normal) Collected: 02/08/24 1743    Specimen: Blood Updated: 02/08/24 1816     Salicylate <0.3 mg/dL     Acetaminophen Level [[id number]]  (Normal) Collected: 02/08/24 1743    Specimen: Blood Updated: 02/08/24 1816     Acetaminophen <5.0 mcg/mL     Urinalysis With Culture If Indicated - Urine, Clean Catch [[id number]]  (Abnormal) Collected: 02/08/24 1754    Specimen: Urine, Clean Catch Updated: 02/08/24 1815     Color, UA Dark Yellow     Appearance, UA Clear     pH, UA 6.0     Specific Gravity, UA 1.011     Glucose, UA Negative     Ketones, UA Negative     Bilirubin, UA Small (1+)     Blood, UA Negative     Protein, UA Trace     Leuk Esterase, UA Trace     Nitrite, UA Negative     Urobilinogen, UA 0.2 E.U./dL    Narrative:      In absence of clinical symptoms, the presence of pyuria, bacteria, and/or nitrites on the urinalysis result does not correlate with infection.    Urinalysis, Microscopic Only - Urine, Clean Catch [[id number]]  (Abnormal) Collected: 02/08/24 1754    Specimen: Urine, " Clean Catch Updated: 02/08/24 1815     RBC, UA 0-2 /HPF      WBC, UA 3-5 /HPF      Comment: Urine culture not indicated.        Bacteria, UA 1+ /HPF      Squamous Epithelial Cells, UA 7-12 /HPF      Hyaline Casts, UA 3-6 /LPF      Methodology Automated Microscopy    Comprehensive Metabolic Panel [[id number]]  (Abnormal) Collected: 02/08/24 1743    Specimen: Blood Updated: 02/08/24 1814     Glucose 55 mg/dL      BUN 35 mg/dL      Creatinine 1.50 mg/dL      Sodium 132 mmol/L      Potassium 4.0 mmol/L      Chloride 97 mmol/L      CO2 24.0 mmol/L      Calcium 9.1 mg/dL      Total Protein 6.9 g/dL      Albumin 3.5 g/dL      ALT (SGPT) 50 U/L      AST (SGOT) 75 U/L      Alkaline Phosphatase 492 U/L      Total Bilirubin 1.5 mg/dL      Globulin 3.4 gm/dL      A/G Ratio 1.0 g/dL      BUN/Creatinine Ratio 23.3     Anion Gap 11.0 mmol/L      eGFR 36.0 mL/min/1.73     Narrative:      GFR Normal >60  Chronic Kidney Disease <60  Kidney Failure <15    The GFR formula is only valid for adults with stable renal function between ages 18 and 70.    Ammonia [[id number]]  (Normal) Collected: 02/08/24 1743    Specimen: Blood Updated: 02/08/24 1813     Ammonia 30 umol/L     Lactic Acid, Plasma [[id number]]  (Normal) Collected: 02/08/24 1743    Specimen: Blood Updated: 02/08/24 1812     Lactate 1.5 mmol/L     Blood Culture - Blood, Arm, Left [[id number]] Collected: 02/08/24 1801    Specimen: Blood from Arm, Left Updated: 02/08/24 1810    Magnesium [[id number]]  (Normal) Collected: 02/08/24 1743    Specimen: Blood Updated: 02/08/24 1809     Magnesium 2.1 mg/dL     Lipase [[id number]]  (Abnormal) Collected: 02/08/24 1743    Specimen: Blood Updated: 02/08/24 1809     Lipase 5 U/L     Protime-INR [[id number]]  (Abnormal) Collected: 02/08/24 1743    Specimen: Blood Updated: 02/08/24 1803     Protime 14.6 Seconds      INR 1.12    aPTT [[id number]]  (Normal) Collected: 02/08/24 1743    Specimen: Blood Updated: 02/08/24 1803     PTT 35.0 seconds     Blood  Culture - Blood, Arm, Right [[id number]] Collected: 02/08/24 1743    Specimen: Blood from Arm, Right Updated: 02/08/24 1802    CBC & Differential [[id number]]  (Abnormal) Collected: 02/08/24 1743    Specimen: Blood Updated: 02/08/24 1754    Narrative:      The following orders were created for panel order CBC & Differential.  Procedure                               Abnormality         Status                     ---------                               -----------         ------                     CBC Auto Differential[[id number]]        Abnormal            Final result                 Please view results for these tests on the individual orders.    CBC Auto Differential [[id number]]  (Abnormal) Collected: 02/08/24 1743    Specimen: Blood Updated: 02/08/24 1754     WBC 20.38 10*3/mm3      RBC 3.73 10*6/mm3      Hemoglobin 11.7 g/dL      Hematocrit 35.3 %      MCV 94.6 fL      MCH 31.4 pg      MCHC 33.1 g/dL      RDW 15.1 %      RDW-SD 52.6 fl      MPV 11.4 fL      Platelets 152 10*3/mm3      Neutrophil % 80.7 %      Lymphocyte % 7.3 %      Monocyte % 10.4 %      Eosinophil % 0.1 %      Basophil % 0.2 %      Immature Grans % 1.3 %      Neutrophils, Absolute 16.45 10*3/mm3      Lymphocytes, Absolute 1.48 10*3/mm3      Monocytes, Absolute 2.12 10*3/mm3      Eosinophils, Absolute 0.02 10*3/mm3      Basophils, Absolute 0.05 10*3/mm3      Immature Grans, Absolute 0.26 10*3/mm3      nRBC 0.0 /100 WBC              -----------------------------------------------------------------    Radiology:     CT Abdomen Pelvis With Contrast    Result Date: 2/8/2024  CT ABDOMEN PELVIS W CONTRAST- 2/8/2024 5:17 PM  HISTORY: distension, pain   COMPARISON: 12/13/2023.  DLP: 200.69 mGy.cm. All CT scans are performed using dose optimization techniques as appropriate to the performed exam and including at least one of the following: Automated exposure control, adjustment of the mA and/or kV according to size, and the use of the iterative  reconstruction technique.  TECHNIQUE: Following the intravenous administration of contrast, helical CT tomographic images of the abdomen and pelvis were acquired. Coronal reformatted images were also provided for review.  FINDINGS: Bibasilar atelectasis is present. There is mild cardiomegaly. No pericardial effusion..  LIVER: There is pneumobilia within the left lobe of the liver related to previous biliary diversion. A biliary stent is in place. There is mild intrahepatic predilatation stable from the previous exam. No discrete hepatic mass. The patient has undergone previous Whipple procedure. As noted previously there is some soft tissue fullness in the region of the tomas hepatis some of which represents pulled up bowel loops related to the patient's previous Whipple procedure. Overall the soft tissue fullness in the region of the tomas hepatis is stable. There is normal enhancement of the portal vein and branches..  BILIARY SYSTEM: Stable mild intrahepatic biliary dilatation. Fullness in the tomas hepatis as well as a biliary stent are again demonstrated. Overall stability in this finding from the previous exam of December of last year..  PANCREAS: Mild prominence of the pancreatic duct similar to the previous exam. The patient has undergone Whipple procedure with resection of the pancreatic head..  SPLEEN: Unremarkable.  KIDNEYS AND ADRENALS: The adrenals are unremarkable. No discrete renal mass. There is a nonobstructing calculus involving mid pole calyx of the right kidney stable from the previous exam.. The ureters are decompressed and normal in appearance.  RETROPERITONEUM: No mass, lymphadenopathy or hemorrhage. There is atheromatous calcification of the abdominal aorta and iliac arteries with no evidence of aneurysm.  GI TRACT: There is mild constipation. Diverticulosis is noted of the descending and sigmoid colon with no evidence of diverticulitis. Nonspecific fluid-filled bowel loops are present  without discrete transition point or mechanical obstruction. No focal bowel wall thickening. No evidence of gastric wall thickening or gastric outlet obstruction.. No evidence of acute appendicitis..  OTHER: There are some prominent nodes within the small bowel mesentery/root of the mesentery stable from the previous exam. The largest of these nodes is to the right of midline measuring 10 mm in long axis as previously noted.. The abdominopelvic vasculature is patent. The osseous structures and soft tissues demonstrate no worrisome lesions. A small fat-containing periumbilical hernia is present. A fat-containing left inguinal hernia is also present.  PELVIS: The uterus and adnexa are unremarkable. No free fluid in the cul-de-sac.. The urinary bladder is normal in appearance.      1. Status post Whipple procedure. There is pneumobilia as well as mild intrahepatic biliary dilatation stable from the previous exam. There is persistent soft tissue fullness in the region of the tomas hepatis some of which is postoperative in nature. Overall stable appearance from the previous exam. There are again some prominent nodes within the small bowel mesentery and at the root of the mesentery also stable. No new mass or increasing lymphadenopathy. 2. Constipation. Nonspecific fluid-filled bowel loops are present without evidence of a discrete transition point. This may be related to stasis from the increased volume of fecal material within the colon. There is diverticulosis of the sigmoid colon without diverticulitis. 3. Nonobstructing right-sided nephrolithiasis. No ureteral stone or obstructive uropathy. 4. Fat-containing periumbilical hernia. 5. Fat-containing left inguinal hernia. 6. The uterus and adnexa are unremarkable..    This report was signed and finalized on 2/8/2024 6:57 PM by Dr. Palmer Jacobs MD.      XR Chest 1 View    Result Date: 2/8/2024  EXAMINATION: XR CHEST 1 VW-  2/8/2024 5:20 PM  HISTORY: Fever.  Hypotension.  FINDINGS: Upright frontal projection of the chest is compared to prior exam of 6/13/2022. The lungs are fully expanded and clear. No consolidative pneumonia or effusion is present. The thoracic aorta is mildly ectatic.      1. No acute disease.  This report was signed and finalized on 2/8/2024 5:22 PM by Dr. Palmer Jacobs MD.      US Liver    Result Date: 2/8/2024  US LIVER-2/8/2024 3:29 PM  REASON FOR EXAM: ascites, distrension, pain   COMPARISON: NONE  TECHNIQUE: Multiple longitudinal and transverse realtime sonographic images of the right upper quadrant of the abdomen are obtained.  These images and the report are permanently stored in the PACS system.  FINDINGS:  Pancreas: There is mild prominence of the pancreatic duct with a diameter of 3 mm. The patient has a history of Whipple procedure. The body and tail of the pancreas are grossly unremarkable..  Liver: Echogenic foci within the left lobe of the liver with dirty shadowing is likely related to pneumobilia from previous biliary diversion. There is mild intrahepatic bili dilatation. The liver is homogeneous in echotexture without evidence of discrete mass. There is no free fluid in the upper abdomen or perihepatic space. Color Doppler demonstrates hepatopedal flow within the portal vein..  Gallbladder: The gallbladder is surgically absent..  Bile ducts: The CBD measures 0.33 cm in diameter. There is no intrahepatic or extrahepatic ductal dilation.  Right kidney: Normal in size, shape and contour. No mass, hydronephrosis or calculi. No perinephric fluid collection.   Other: The visualized abdominal aorta is normal in caliber.       1. Status post cholecystectomy as well as Whipple procedure. Suspected pneumobilia postoperative with foci of increased echogenicity within the biliary tree in the left lobe with dirty shadowing. No significant biliary dilatation is demonstrated. There is no free fluid in the right upper quadrant.    This report was  signed and finalized on 2/8/2024 5:08 PM by Dr. Palmer Jacobs MD.        Assessment and Plan   Sepsis  Unclear etiology at this time.  Low suspicion for peritonitis as she has no peritoneal signs.  No wounds and UA fairly unremarkable with only trace bacteria.  Grossly elevated procalcitonin raising concern for bacterial infection such as bloodstream infection.  Plan:  -Continue broad-spectrum antibiotics  -Treated with IV fluids with response from blood pressure standpoint  -Follow cultures  -Consult infectious disease    RSV infection  Old, recovering.  Likely the cause of her respiratory symptoms a week ago    ADRIEN  Secondary to sepsis, resolved with IV fluids    Cholangiocarcinoma  Remote but some concern for recurrence.  Dr. Oswald consulted and appreciate his assistance.  She routinely follows with Dr. Cee.    Disposition: Inpatient    CODE STATUS: Full    DVT prophylaxis: Lovenox      Sepsis    Cholangiocarcinoma    ADRIEN (acute kidney injury)    RSV infection      Yoan Milton MD 2/9/2024 07:39 CST

## 2024-02-09 NOTE — PLAN OF CARE
Goal Outcome Evaluation:  Plan of Care Reviewed With: patient, spouse        Progress: improving       VSS.  Up ad marlon.  Voiding.  Diarrhea several times this shift.  IV abx as ordered.  Tolerating regular diet.  Lovenox.  Awaiting blood culture report.  ID consult.  Denies pain.  Safety maintained.  Droplet precautions.

## 2024-02-09 NOTE — CONSULTS
"Pharmacy Dosing Service  Antimicrobial Dosing  Zosyn & Vancomycin    Assessment/Action/Plan:  Based on indication and renal function, initiated extended-infusion Zosyn 3.375 gm IV over 4 hours every 8 hours. Initiated Vancomycin 1,000 mg loading dose followed by 750 mg IV every 24 hours. No levels ordered at this time.   Pharmacy will continue to monitor daily and make further adjustment(s) accordingly.     Subjective:  Candice Kingsley is a 78 y.o. female with a  \"Pharmacy to Dose Zosyn & Vancomycin\" consult for the treatment of Sepsis , day 1 of treatment.    AUC Model Data for Vancomycin:  Loading dose: 1000 mg at 23:00 02/08/2024.  Regimen: 750 mg IV every 24 hours.  Start time: 23:00 on 02/09/2024  Exposure target: AUC24 (range)400-600 mg/L.hr   AUC24,ss: 548 mg/L.hr  PAUC*: 83 %  Ctrough,ss: 18 mg/L  Pconc*: 39 %  Tox.: 14 %    Objective:  Ht: 157.5 cm (62\"); Wt: 49.9 kg (110 lb)  Estimated Creatinine Clearance: 25.1 mL/min (A) (by C-G formula based on SCr of 1.5 mg/dL (H)).   Creatinine   Date Value Ref Range Status   02/08/2024 1.50 (H) 0.57 - 1.00 mg/dL Final   02/08/2024 1.18 (H) 0.57 - 1.00 mg/dL Final      Lab Results   Component Value Date    WBC 20.38 (H) 02/08/2024    WBC 23.60 (H) 02/08/2024      Baseline culture results:  Microbiology Results (last 10 days)       Procedure Component Value - Date/Time    Respiratory Panel PCR w/COVID-19(SARS-CoV-2) [facility] In-House, NP Swab in UTM/VTM, 2 HR TAT - Swab, Nasopharynx [[id number]]  (Abnormal) Collected: 02/08/24 1745    Lab Status: Final result Specimen: Swab from Nasopharynx Updated: 02/08/24 1902     ADENOVIRUS, PCR Not Detected     Coronavirus 229E Not Detected     Coronavirus HKU1 Not Detected     Coronavirus NL63 Not Detected     Coronavirus OC43 Not Detected     COVID19 Not Detected     Human Metapneumovirus Not Detected     Human Rhinovirus/Enterovirus Not Detected     Influenza A PCR Not Detected     Influenza B PCR Not " Detected     Parainfluenza Virus 1 Not Detected     Parainfluenza Virus 2 Not Detected     Parainfluenza Virus 3 Not Detected     Parainfluenza Virus 4 Not Detected     RSV, PCR Detected     Bordetella pertussis pcr Not Detected     Bordetella parapertussis PCR Not Detected     Chlamydophila pneumoniae PCR Not Detected     Mycoplasma pneumo by PCR Not Detected    Narrative:      In the setting of a positive respiratory panel with a viral infection PLUS a negative procalcitonin without other underlying concern for bacterial infection, consider observing off antibiotics or discontinuation of antibiotics and continue supportive care. If the respiratory panel is positive for atypical bacterial infection (Bordetella pertussis, Chlamydophila pneumoniae, or Mycoplasma pneumoniae), consider antibiotic de-escalation to target atypical bacterial infection.            Scooter Antony, PharmD  02/08/24 22:33 CST

## 2024-02-09 NOTE — CONSULTS
"INFECTIOUS DISEASES CONSULT NOTE    Patient:  Candice Kingsley 78 y.o. female  ROOM # 392/1  YOB: 1945  MRN: [id number]  CSN:  [id number]  Admit date: 2/8/2024   Admitting Physician: Yoan Milton MD  Primary Care Physician: Yoan Milton MD  REFERRING PROVIDER: Yoan Milton MD    Inpatient Infectious Diseases Consult  Consult performed by: Pura Conte MD  Consult ordered by: Yoan Milton MD          REASON FOR CONSULTATION : \"Sepsis of unknown origin\"      HISTORY OF PRESENT ILLNESS: Patient is a pleasant 78-year-old female who was admitted yesterday after presenting to Dr. Milton's office early in the day with abdominal swelling and fatigue.  She indicated her blood pressure was very low and Dr. iMlton wanted her to come the emergency department but she declined.  She was agreeable to getting blood work and when white count was noted to be very elevated, she agreed to go to the emergency department.    Was a little difficult to get a chronologic history over the last few weeks.  She tell me that she did have a sinus infection when she was seen in the office about a week ago and got a Z-Kentrell.  I talked about the fact that her RSV was positive and she reports that her lungs are \"fine\".  I mentioned that it is often upper respiratory and not always involving the lungs and she said that she has had bronchitis many times before and that was not the case.    Currently she is complaining of diarrhea.  She explained that she had's had issues with diarrhea after her Whipple and was on a lot of Lomotil postoperatively.  She reports that prior to today she would have at least 1 stool a day that was loose.  She said now it is \"pure water\".  She is gone 2-3 times today.  She is not having any associated abdominal pain.    When I tried to pin her down regarding complaints prior to admission such as dysuria, frequency, cough, face pain to indicate sinus " "infection, abdominal pain she did say that her abdomen was her main problem.  She said she looked \"6 months no 8 months pregnant\".    She first that she had been dealing with this since December and then thought maybe it started in September.  When I asked her what \"this was\" she said abdominal swelling, hardness of the abdomen and abdominal pain.    She reports her abdominal pain is much better, the swelling is better and the firmness is better.          Past Medical History:   Diagnosis Date    Asthma     Cancer     Cholangiocarcinoma    Hypertension     Migraines     Nausea & vomiting 06/13/2022     Past Surgical History:   Procedure Laterality Date    BREAST BIOPSY Left 1994    benign    CHOLECYSTECTOMY      COLONOSCOPY  05/22/2013    Diverticulosis, Hemorrhoids repeat exam in 5 years    COLONOSCOPY N/A 09/28/2021    Tics otherwise normal exam repeat in 7 years    COLONOSCOPY W/ POLYPECTOMY  07/26/2010    Hyperplastic polyp cecum, pan-diverticular disease repeat exam in 3 years    COMMON BILE DUCT EXPLORATION      For carcinoma of the bile duct    ENDOSCOPY  02/01/2016    Normal exam    WHIPPLE PROCEDURE  2011     Family History   Problem Relation Age of Onset    No Known Problems Mother     No Known Problems Father     Breast cancer Sister     Colon cancer Neg Hx     Colon polyps Neg Hx      Social History     Socioeconomic History    Marital status:    Tobacco Use    Smoking status: Former    Smokeless tobacco: Never   Vaping Use    Vaping Use: Never used   Substance and Sexual Activity    Alcohol use: Yes     Comment: Occasional    Drug use: Never    Sexual activity: Defer       Current Scheduled Medications:   enoxaparin, 30 mg, Subcutaneous, Daily  piperacillin-tazobactam, 3.375 g, Intravenous, Q8H  sodium chloride, 10 mL, Intravenous, Q12H  vancomycin, 750 mg, Intravenous, Q24H              Current PRN Medications:    senna-docusate sodium **AND** polyethylene glycol **AND** bisacodyl **AND** " "bisacodyl    Calcium Replacement - Follow Nurse / BPA Driven Protocol    HYDROcodone-acetaminophen    LORazepam    Magnesium Standard Dose Replacement - Follow Nurse / BPA Driven Protocol    ondansetron    Pharmacy Consult - Pharmacy to dose    Phosphorus Replacement - Follow Nurse / BPA Driven Protocol    Potassium Replacement - Follow Nurse / BPA Driven Protocol    [COMPLETED] Insert Peripheral IV **AND** sodium chloride    sodium chloride    sodium chloride      Pharmacy Consult - Pharmacy to dose,              Allergies   Allergen Reactions    Cefdinir Unknown - High Severity    Cefuroxime Unknown - High Severity    Sulfa Antibiotics Hives    Cephalosporins Rash           Vital Signs:  /63 (BP Location: Left arm, Patient Position: Lying)   Pulse 64   Temp 97.6 °F (36.4 °C) (Oral)   Resp 16   Ht 157.5 cm (62\")   Wt 49.9 kg (110 lb)   SpO2 100%   BMI 20.12 kg/m²   Temp (24hrs), Av.6 °F (36.4 °C), Min:97 °F (36.1 °C), Max:97.9 °F (36.6 °C)      Physical Exam  General: Patient was ambulating back from the bathroom and appeared in no acute distress  Heart: S1 is 2 rate was regular  Lungs: Clear bilaterally  Abdomen: Soft, no rebound or guarding.  Some periumbilical supraumbilical firmness.  No erythema noted.  Neuro: Alert, oriented, speech clear, able to ambulate back to the bathroom with her IV pole without any difficulty  Psych: Pleasant cooperative        Results Review:    I reviewed the patient's new clinical results.    Lab Results:    CBC:   Lab Results   Lab 24  1237 24  1743 24  0451   WBC 23.60* 20.38* 12.15*   HEMOGLOBIN 11.7* 11.7* 11.1*   HEMATOCRIT 35.2 35.3 32.2*   PLATELETS 149 152 144        AutoDiff:   Lab Results   Lab 24  1237 24  1743 24  0451   NEUTROPHIL % 82.9* 80.7* 80.1*   LYMPHOCYTE % 5.8* 7.3* 8.5*   MONOCYTES % 9.8 10.4 10.1   EOSINOPHIL % 0.1* 0.1* 0.2*   BASOPHIL % 0.3 0.2 0.2   NEUTROS ABS 19.58* 16.45* 9.73*   LYMPHS ABS 1.36 1.48 " 1.03   MONOS ABS 2.32* 2.12* 1.23*   EOS ABS 0.02 0.02 0.03   BASOS ABS 0.06 0.05 0.02        Manual Diff:    Lab Results   Lab 02/08/24 1237 02/08/24 1743 02/09/24 0451   NEUTROS ABS 19.58* 16.45* 9.73*        CMP:   Lab Results   Lab 02/08/24  1237 02/08/24 1743 02/09/24 0451   SODIUM 132* 132* 136   POTASSIUM 4.1 4.0 4.1   CHLORIDE 96* 97* 105   CO2 24.0 24.0 20.0*   BUN 30* 35* 26*   CREATININE 1.18* 1.50* 0.79   CALCIUM 8.6 9.1 8.4*   BILIRUBIN 1.7* 1.5* 1.2   ALK PHOS 473* 492* 426*   ALT (SGPT) 51* 50* 40*   AST (SGOT) 86* 75* 53*   GLUCOSE 101* 55* 97       Lab Results (last 72 hours)       Procedure Component Value Units Date/Time    Cancer Antigen 19-9 [[id number]] Collected: 02/08/24 1743    Specimen: Blood Updated: 02/09/24 0821    CEA [[id number]] Collected: 02/08/24 1743    Specimen: Blood Updated: 02/09/24 0821    Comprehensive Metabolic Panel [[id number]]  (Abnormal) Collected: 02/09/24 0451    Specimen: Blood Updated: 02/09/24 0603     Glucose 97 mg/dL      BUN 26 mg/dL      Creatinine 0.79 mg/dL      Sodium 136 mmol/L      Potassium 4.1 mmol/L      Chloride 105 mmol/L      CO2 20.0 mmol/L      Calcium 8.4 mg/dL      Total Protein 6.2 g/dL      Albumin 3.1 g/dL      ALT (SGPT) 40 U/L      AST (SGOT) 53 U/L      Alkaline Phosphatase 426 U/L      Total Bilirubin 1.2 mg/dL      Globulin 3.1 gm/dL      A/G Ratio 1.0 g/dL      BUN/Creatinine Ratio 32.9     Anion Gap 11.0 mmol/L      eGFR 77.6 mL/min/1.73     Narrative:      GFR Normal >60  Chronic Kidney Disease <60  Kidney Failure <15    The GFR formula is only valid for adults with stable renal function between ages 18 and 70.    CBC & Differential [[id number]]  (Abnormal) Collected: 02/09/24 0451    Specimen: Blood Updated: 02/09/24 0540    Narrative:      The following orders were created for panel order CBC & Differential.  Procedure                               Abnormality         Status                     ---------                                -----------         ------                     CBC Auto Differential[[id number]]        Abnormal            Final result                 Please view results for these tests on the individual orders.    CBC Auto Differential [[id number]]  (Abnormal) Collected: 02/09/24 0451    Specimen: Blood Updated: 02/09/24 0540     WBC 12.15 10*3/mm3      RBC 3.42 10*6/mm3      Hemoglobin 11.1 g/dL      Hematocrit 32.2 %      MCV 94.2 fL      MCH 32.5 pg      MCHC 34.5 g/dL      RDW 14.9 %      RDW-SD 51.8 fl      MPV 12.1 fL      Platelets 144 10*3/mm3      Neutrophil % 80.1 %      Lymphocyte % 8.5 %      Monocyte % 10.1 %      Eosinophil % 0.2 %      Basophil % 0.2 %      Immature Grans % 0.9 %      Neutrophils, Absolute 9.73 10*3/mm3      Lymphocytes, Absolute 1.03 10*3/mm3      Monocytes, Absolute 1.23 10*3/mm3      Eosinophils, Absolute 0.03 10*3/mm3      Basophils, Absolute 0.02 10*3/mm3      Immature Grans, Absolute 0.11 10*3/mm3      nRBC 0.0 /100 WBC     Hepatitis Panel, Acute [[id number]]  (Normal) Collected: 02/08/24 1743    Specimen: Blood Updated: 02/08/24 2250     Hepatitis B Surface Ag Non-Reactive     Hep A IgM Non-Reactive     Hep B C IgM Non-Reactive     Hepatitis C Ab Non-Reactive    Narrative:      Results may be falsely decreased if patient taking Biotin.     Respiratory Panel PCR w/COVID-19(SARS-CoV-2) [facility]/M[facility]ALES In-House, NP Swab in [facility], 2 HR TAT - Swab, Nasopharynx [[id number]]  (Abnormal) Collected: 02/08/24 1745    Specimen: Swab from Nasopharynx Updated: 02/08/24 1902     ADENOVIRUS, PCR Not Detected     Coronavirus 229E Not Detected     Coronavirus HKU1 Not Detected     Coronavirus NL63 Not Detected     Coronavirus OC43 Not Detected     COVID19 Not Detected     Human Metapneumovirus Not Detected     Human Rhinovirus/Enterovirus Not Detected     Influenza A PCR Not Detected     Influenza B PCR Not Detected     Parainfluenza Virus 1 Not Detected     Parainfluenza Virus 2 Not Detected      "Parainfluenza Virus 3 Not Detected     Parainfluenza Virus 4 Not Detected     RSV, PCR Detected     Bordetella pertussis pcr Not Detected     Bordetella parapertussis PCR Not Detected     Chlamydophila pneumoniae PCR Not Detected     Mycoplasma pneumo by PCR Not Detected    Narrative:      In the setting of a positive respiratory panel with a viral infection PLUS a negative procalcitonin without other underlying concern for bacterial infection, consider observing off antibiotics or discontinuation of antibiotics and continue supportive care. If the respiratory panel is positive for atypical bacterial infection (Bordetella pertussis, Chlamydophila pneumoniae, or Mycoplasma pneumoniae), consider antibiotic de-escalation to target atypical bacterial infection.    Procalcitonin [[id number]]  (Abnormal) Collected: 02/08/24 1743    Specimen: Blood Updated: 02/08/24 1820     Procalcitonin 49.49 ng/mL     Narrative:      As a Marker for Sepsis (Non-Neonates):    1. <0.5 ng/mL represents a low risk of severe sepsis and/or septic shock.  2. >2 ng/mL represents a high risk of severe sepsis and/or septic shock.    As a Marker for Lower Respiratory Tract Infections that require antibiotic therapy:    PCT on Admission    Antibiotic Therapy       6-12 Hrs later    >0.5                Strongly Recommended  >0.25 - <0.5        Recommended   0.1 - 0.25          Discouraged              Remeasure/reassess PCT  <0.1                Strongly Discouraged     Remeasure/reassess PCT    As 28 day mortality risk marker: \"Change in Procalcitonin Result\" (>80% or <=80%) if Day 0 (or Day 1) and Day 4 values are available. Refer to http://[email or web address]-pct-[email or web address]    Change in PCT <=80%  A decrease of PCT levels below or equal to 80% defines a positive change in PCT test result representing a higher risk for 28-day all-cause mortality of patients diagnosed with severe sepsis for septic shock.    Change in PCT >80%  A decrease of PCT levels of " more than 80% defines a negative change in PCT result representing a lower risk for 28-day all-cause mortality of patients diagnosed with severe sepsis or septic shock.       Sedimentation Rate [[id number]]  (Abnormal) Collected: 02/08/24 1743    Specimen: Blood Updated: 02/08/24 1818     Sed Rate 51 mm/hr     C-reactive Protein [[id number]]  (Abnormal) Collected: 02/08/24 1743    Specimen: Blood Updated: 02/08/24 1816     C-Reactive Protein 13.30 mg/dL     Salicylate Level [[id number]]  (Normal) Collected: 02/08/24 1743    Specimen: Blood Updated: 02/08/24 1816     Salicylate <0.3 mg/dL     Acetaminophen Level [[id number]]  (Normal) Collected: 02/08/24 1743    Specimen: Blood Updated: 02/08/24 1816     Acetaminophen <5.0 mcg/mL     Urinalysis With Culture If Indicated - Urine, Clean Catch [[id number]]  (Abnormal) Collected: 02/08/24 1754    Specimen: Urine, Clean Catch Updated: 02/08/24 1815     Color, UA Dark Yellow     Appearance, UA Clear     pH, UA 6.0     Specific Gravity, UA 1.011     Glucose, UA Negative     Ketones, UA Negative     Bilirubin, UA Small (1+)     Blood, UA Negative     Protein, UA Trace     Leuk Esterase, UA Trace     Nitrite, UA Negative     Urobilinogen, UA 0.2 E.U./dL    Narrative:      In absence of clinical symptoms, the presence of pyuria, bacteria, and/or nitrites on the urinalysis result does not correlate with infection.    Urinalysis, Microscopic Only - Urine, Clean Catch [[id number]]  (Abnormal) Collected: 02/08/24 1754    Specimen: Urine, Clean Catch Updated: 02/08/24 1815     RBC, UA 0-2 /HPF      WBC, UA 3-5 /HPF      Comment: Urine culture not indicated.        Bacteria, UA 1+ /HPF      Squamous Epithelial Cells, UA 7-12 /HPF      Hyaline Casts, UA 3-6 /LPF      Methodology Automated Microscopy    Comprehensive Metabolic Panel [[id number]]  (Abnormal) Collected: 02/08/24 1743    Specimen: Blood Updated: 02/08/24 1814     Glucose 55 mg/dL      BUN 35 mg/dL      Creatinine 1.50 mg/dL       Sodium 132 mmol/L      Potassium 4.0 mmol/L      Chloride 97 mmol/L      CO2 24.0 mmol/L      Calcium 9.1 mg/dL      Total Protein 6.9 g/dL      Albumin 3.5 g/dL      ALT (SGPT) 50 U/L      AST (SGOT) 75 U/L      Alkaline Phosphatase 492 U/L      Total Bilirubin 1.5 mg/dL      Globulin 3.4 gm/dL      A/G Ratio 1.0 g/dL      BUN/Creatinine Ratio 23.3     Anion Gap 11.0 mmol/L      eGFR 36.0 mL/min/1.73     Narrative:      GFR Normal >60  Chronic Kidney Disease <60  Kidney Failure <15    The GFR formula is only valid for adults with stable renal function between ages 18 and 70.    Ammonia [[id number]]  (Normal) Collected: 02/08/24 1743    Specimen: Blood Updated: 02/08/24 1813     Ammonia 30 umol/L     Lactic Acid, Plasma [[id number]]  (Normal) Collected: 02/08/24 1743    Specimen: Blood Updated: 02/08/24 1812     Lactate 1.5 mmol/L     Blood Culture - Blood, Arm, Left [[id number]] Collected: 02/08/24 1801    Specimen: Blood from Arm, Left Updated: 02/08/24 1810    Magnesium [[id number]]  (Normal) Collected: 02/08/24 1743    Specimen: Blood Updated: 02/08/24 1809     Magnesium 2.1 mg/dL     Lipase [[id number]]  (Abnormal) Collected: 02/08/24 1743    Specimen: Blood Updated: 02/08/24 1809     Lipase 5 U/L     Protime-INR [[id number]]  (Abnormal) Collected: 02/08/24 1743    Specimen: Blood Updated: 02/08/24 1803     Protime 14.6 Seconds      INR 1.12    aPTT [[id number]]  (Normal) Collected: 02/08/24 1743    Specimen: Blood Updated: 02/08/24 1803     PTT 35.0 seconds     Blood Culture - Blood, Arm, Right [[id number]] Collected: 02/08/24 1743    Specimen: Blood from Arm, Right Updated: 02/08/24 1802    CBC & Differential [[id number]]  (Abnormal) Collected: 02/08/24 1743    Specimen: Blood Updated: 02/08/24 1754    Narrative:      The following orders were created for panel order CBC & Differential.  Procedure                               Abnormality         Status                     ---------                                -----------         ------                     CBC Auto Differential[[id number]]        Abnormal            Final result                 Please view results for these tests on the individual orders.    CBC Auto Differential [[id number]]  (Abnormal) Collected: 02/08/24 1743    Specimen: Blood Updated: 02/08/24 1754     WBC 20.38 10*3/mm3      RBC 3.73 10*6/mm3      Hemoglobin 11.7 g/dL      Hematocrit 35.3 %      MCV 94.6 fL      MCH 31.4 pg      MCHC 33.1 g/dL      RDW 15.1 %      RDW-SD 52.6 fl      MPV 11.4 fL      Platelets 152 10*3/mm3      Neutrophil % 80.7 %      Lymphocyte % 7.3 %      Monocyte % 10.4 %      Eosinophil % 0.1 %      Basophil % 0.2 %      Immature Grans % 1.3 %      Neutrophils, Absolute 16.45 10*3/mm3      Lymphocytes, Absolute 1.48 10*3/mm3      Monocytes, Absolute 2.12 10*3/mm3      Eosinophils, Absolute 0.02 10*3/mm3      Basophils, Absolute 0.05 10*3/mm3      Immature Grans, Absolute 0.26 10*3/mm3      nRBC 0.0 /100 WBC             Estimated Creatinine Clearance: 47.7 mL/min (by C-G formula based on SCr of 0.79 mg/dL).    Culture Results:    Microbiology Results (last 10 days)       Procedure Component Value - Date/Time    Respiratory Panel PCR w/COVID-19(SARS-CoV-2) [facility]NA/YOANA/TRINITY/PAD/COR/M[facility]ALES In-House, NP Swab in UTM/VTM, 2 HR TAT - Swab, Nasopharynx [[id number]]  (Abnormal) Collected: 02/08/24 1745    Lab Status: Final result Specimen: Swab from Nasopharynx Updated: 02/08/24 1902     ADENOVIRUS, PCR Not Detected     Coronavirus 229E Not Detected     Coronavirus HKU1 Not Detected     Coronavirus NL63 Not Detected     Coronavirus OC43 Not Detected     COVID19 Not Detected     Human Metapneumovirus Not Detected     Human Rhinovirus/Enterovirus Not Detected     Influenza A PCR Not Detected     Influenza B PCR Not Detected     Parainfluenza Virus 1 Not Detected     Parainfluenza Virus 2 Not Detected     Parainfluenza Virus 3 Not Detected     Parainfluenza Virus 4 Not Detected     RSV, PCR Detected      Bordetella pertussis pcr Not Detected     Bordetella parapertussis PCR Not Detected     Chlamydophila pneumoniae PCR Not Detected     Mycoplasma pneumo by PCR Not Detected    Narrative:      In the setting of a positive respiratory panel with a viral infection PLUS a negative procalcitonin without other underlying concern for bacterial infection, consider observing off antibiotics or discontinuation of antibiotics and continue supportive care. If the respiratory panel is positive for atypical bacterial infection (Bordetella pertussis, Chlamydophila pneumoniae, or Mycoplasma pneumoniae), consider antibiotic de-escalation to target atypical bacterial infection.               Radiology:     CT reviewed.  Biliary stent in place.  Status post Whipple procedure.  No perinephric stranding.    Imaging Results (Last 72 Hours)       Procedure Component Value Units Date/Time    CT Abdomen Pelvis With Contrast [[id number]] Collected: 02/08/24 1848     Updated: 02/08/24 1901    Narrative:      CT ABDOMEN PELVIS W CONTRAST- 2/8/2024 5:17 PM     HISTORY: distension, pain       COMPARISON: 12/13/2023.     DLP: 200.69 mGy.cm. All CT scans are performed using dose optimization  techniques as appropriate to the performed exam and including at least  one of the following: Automated exposure control, adjustment of the mA  and/or kV according to size, and the use of the iterative reconstruction  technique.     TECHNIQUE: Following the intravenous administration of contrast, helical  CT tomographic images of the abdomen and pelvis were acquired. Coronal  reformatted images were also provided for review.     FINDINGS:  Bibasilar atelectasis is present. There is mild cardiomegaly. No  pericardial effusion..     LIVER: There is pneumobilia within the left lobe of the liver related to  previous biliary diversion. A biliary stent is in place. There is mild  intrahepatic predilatation stable from the previous exam. No discrete  hepatic mass.  The patient has undergone previous Whipple procedure. As  noted previously there is some soft tissue fullness in the region of the  tomas hepatis some of which represents pulled up bowel loops related to  the patient's previous Whipple procedure. Overall the soft tissue  fullness in the region of the tomas hepatis is stable. There is normal  enhancement of the portal vein and branches..     BILIARY SYSTEM: Stable mild intrahepatic biliary dilatation. Fullness in  the tomas hepatis as well as a biliary stent are again demonstrated.  Overall stability in this finding from the previous exam of December of  last year..     PANCREAS: Mild prominence of the pancreatic duct similar to the previous  exam. The patient has undergone Whipple procedure with resection of the  pancreatic head..     SPLEEN: Unremarkable.     KIDNEYS AND ADRENALS: The adrenals are unremarkable. No discrete renal  mass. There is a nonobstructing calculus involving mid pole calyx of the  right kidney stable from the previous exam.. The ureters are  decompressed and normal in appearance.     RETROPERITONEUM: No mass, lymphadenopathy or hemorrhage. There is  atheromatous calcification of the abdominal aorta and iliac arteries  with no evidence of aneurysm.     GI TRACT: There is mild constipation. Diverticulosis is noted of the  descending and sigmoid colon with no evidence of diverticulitis.  Nonspecific fluid-filled bowel loops are present without discrete  transition point or mechanical obstruction. No focal bowel wall  thickening. No evidence of gastric wall thickening or gastric outlet  obstruction.. No evidence of acute appendicitis..     OTHER: There are some prominent nodes within the small bowel  mesentery/root of the mesentery stable from the previous exam. The  largest of these nodes is to the right of midline measuring 10 mm in  long axis as previously noted.. The abdominopelvic vasculature is  patent. The osseous structures and soft  tissues demonstrate no worrisome  lesions. A small fat-containing periumbilical hernia is present. A  fat-containing left inguinal hernia is also present.     PELVIS: The uterus and adnexa are unremarkable. No free fluid in the  cul-de-sac.. The urinary bladder is normal in appearance.       Impression:      1. Status post Whipple procedure. There is pneumobilia as well as mild  intrahepatic biliary dilatation stable from the previous exam. There is  persistent soft tissue fullness in the region of the tomas hepatis some  of which is postoperative in nature. Overall stable appearance from the  previous exam. There are again some prominent nodes within the small  bowel mesentery and at the root of the mesentery also stable. No new  mass or increasing lymphadenopathy.  2. Constipation. Nonspecific fluid-filled bowel loops are present  without evidence of a discrete transition point. This may be related to  stasis from the increased volume of fecal material within the colon.  There is diverticulosis of the sigmoid colon without diverticulitis.  3. Nonobstructing right-sided nephrolithiasis. No ureteral stone or  obstructive uropathy.  4. Fat-containing periumbilical hernia.  5. Fat-containing left inguinal hernia.  6. The uterus and adnexa are unremarkable..           This report was signed and finalized on 2/8/2024 6:57 PM by Dr. Palmer Jacobs MD.       XR Chest 1 View [[id number]] Collected: 02/08/24 1720     Updated: 02/08/24 1725    Narrative:      EXAMINATION: XR CHEST 1 VW-  2/8/2024 5:20 PM     HISTORY: Fever. Hypotension.     FINDINGS: Upright frontal projection of the chest is compared to prior  exam of 6/13/2022. The lungs are fully expanded and clear. No  consolidative pneumonia or effusion is present. The thoracic aorta is  mildly ectatic.       Impression:      1. No acute disease.     This report was signed and finalized on 2/8/2024 5:22 PM by Dr. Palmer Jacobs MD.        Liver [[id number]]  Collected: 02/08/24 1705     Updated: 02/08/24 1711    Narrative:      US LIVER-2/8/2024 3:29 PM     REASON FOR EXAM: ascites, distrension, pain       COMPARISON: NONE      TECHNIQUE: Multiple longitudinal and transverse realtime sonographic  images of the right upper quadrant of the abdomen are obtained.  These  images and the report are permanently stored in the PACS system.     FINDINGS:    Pancreas: There is mild prominence of the pancreatic duct with a  diameter of 3 mm. The patient has a history of Whipple procedure. The  body and tail of the pancreas are grossly unremarkable..     Liver: Echogenic foci within the left lobe of the liver with dirty  shadowing is likely related to pneumobilia from previous biliary  diversion. There is mild intrahepatic bili dilatation. The liver is  homogeneous in echotexture without evidence of discrete mass. There is  no free fluid in the upper abdomen or perihepatic space. Color Doppler  demonstrates hepatopedal flow within the portal vein..     Gallbladder: The gallbladder is surgically absent..     Bile ducts: The CBD measures 0.33 cm in diameter. There is no  intrahepatic or extrahepatic ductal dilation.     Right kidney: Normal in size, shape and contour. No mass, hydronephrosis  or calculi. No perinephric fluid collection.       Other: The visualized abdominal aorta is normal in caliber.       Impression:         1. Status post cholecystectomy as well as Whipple procedure. Suspected  pneumobilia postoperative with foci of increased echogenicity within the  biliary tree in the left lobe with dirty shadowing. No significant  biliary dilatation is demonstrated. There is no free fluid in the right  upper quadrant.           This report was signed and finalized on 2/8/2024 5:08 PM by Dr. Palmer Jacobs MD.                 HOSPITAL PROBLEM LIST:      Sepsis    Cholangiocarcinoma    ADRIEN (acute kidney injury)    RSV infection      IMPRESSION:  Sepsis on admission of  "questionable etiology in patient with l significant eukocytosis, hypotension, renal dysfunction.  Chest x-ray without any definite source and patient without pulmonary symptoms.  Urinalysis unremarkable.  Complaints focused around her abdominal pain and abdominal swelling.  She notes that she has had issues with this that have been evaluated off and on since September or December 2023.  She states she does notice some increased issues when she is eating and it feels like she may swell and have some firmness until \"something moves through\" she notes that her abdominal fluid has been tapped on 1 occasion and a catheter was left in for some time.  Otherwise she continues to say \"they cannot get to it\".  Hypotension improved with fluid administration in the emergency department.   Increase alk phos, ALT, AST, bilirubin-all trending in correct direction  Leukocytosis-significantly improved  Acute kidney injury-improved with hydration  RSV per nasal PCR swab.-Agree with Dr. Milton the likely cause of her upper respiratory complaints last week.  History of cholangiocarcinoma status post Whipple in 2011.  Patient noted to have persistent elevated CEA and CA 19-9 without any evidence of recurrence      RECOMMENDATION:   Discontinue vancomycin  Continue Zosyn for now.  Monitor diarrhea and patient has previous history of this but notices change today.  She received an outpatient course of azithromycin recently prior to antibiotics here  Monitor abdominal exam closely  Will add manual differential to CBC for a.m.  Continue droplet and contact precautions for RSV.       Reviewed Dr. Oswald's consult reviewed Dr. Milton's H&P    Pura Conte MD  02/09/24  13:13 CST        "

## 2024-02-10 VITALS
HEART RATE: 65 BPM | RESPIRATION RATE: 16 BRPM | WEIGHT: 110 LBS | HEIGHT: 62 IN | OXYGEN SATURATION: 97 % | SYSTOLIC BLOOD PRESSURE: 131 MMHG | BODY MASS INDEX: 20.24 KG/M2 | DIASTOLIC BLOOD PRESSURE: 65 MMHG | TEMPERATURE: 97.5 F

## 2024-02-10 LAB
ALBUMIN SERPL-MCNC: 3.2 G/DL (ref 3.5–5.2)
ALBUMIN/GLOB SERPL: 1 G/DL
ALP SERPL-CCNC: 426 U/L (ref 39–117)
ALT SERPL W P-5'-P-CCNC: 36 U/L (ref 1–33)
ANION GAP SERPL CALCULATED.3IONS-SCNC: 9 MMOL/L (ref 5–15)
ANISOCYTOSIS BLD QL: ABNORMAL
AST SERPL-CCNC: 48 U/L (ref 1–32)
BILIRUB SERPL-MCNC: 1 MG/DL (ref 0–1.2)
BUN SERPL-MCNC: 13 MG/DL (ref 8–23)
BUN/CREAT SERPL: 22.4 (ref 7–25)
BURR CELLS BLD QL SMEAR: ABNORMAL
CALCIUM SPEC-SCNC: 8.8 MG/DL (ref 8.6–10.5)
CHLORIDE SERPL-SCNC: 107 MMOL/L (ref 98–107)
CO2 SERPL-SCNC: 21 MMOL/L (ref 22–29)
CREAT SERPL-MCNC: 0.58 MG/DL (ref 0.57–1)
DEPRECATED RDW RBC AUTO: 52.5 FL (ref 37–54)
EGFRCR SERPLBLD CKD-EPI 2021: 93.9 ML/MIN/1.73
ERYTHROCYTE [DISTWIDTH] IN BLOOD BY AUTOMATED COUNT: 15.1 % (ref 12.3–15.4)
GLOBULIN UR ELPH-MCNC: 3.1 GM/DL
GLUCOSE SERPL-MCNC: 102 MG/DL (ref 65–99)
HCT VFR BLD AUTO: 32.4 % (ref 34–46.6)
HGB BLD-MCNC: 10.8 G/DL (ref 12–15.9)
LYMPHOCYTES # BLD MANUAL: 1.12 10*3/MM3 (ref 0.7–3.1)
LYMPHOCYTES NFR BLD MANUAL: 6 % (ref 5–12)
MCH RBC QN AUTO: 31.6 PG (ref 26.6–33)
MCHC RBC AUTO-ENTMCNC: 33.3 G/DL (ref 31.5–35.7)
MCV RBC AUTO: 94.7 FL (ref 79–97)
MONOCYTES # BLD: 0.48 10*3/MM3 (ref 0.1–0.9)
NEUTROPHILS # BLD AUTO: 6.4 10*3/MM3 (ref 1.7–7)
NEUTROPHILS NFR BLD MANUAL: 80 % (ref 42.7–76)
PLAT MORPH BLD: NORMAL
PLATELET # BLD AUTO: 157 10*3/MM3 (ref 140–450)
PMV BLD AUTO: 11.8 FL (ref 6–12)
POIKILOCYTOSIS BLD QL SMEAR: ABNORMAL
POTASSIUM SERPL-SCNC: 4.3 MMOL/L (ref 3.5–5.2)
PROT SERPL-MCNC: 6.3 G/DL (ref 6–8.5)
RBC # BLD AUTO: 3.42 10*6/MM3 (ref 3.77–5.28)
SODIUM SERPL-SCNC: 137 MMOL/L (ref 136–145)
VARIANT LYMPHS NFR BLD MANUAL: 14 % (ref 19.6–45.3)
WBC MORPH BLD: NORMAL
WBC NRBC COR # BLD AUTO: 8 10*3/MM3 (ref 3.4–10.8)

## 2024-02-10 PROCEDURE — 99232 SBSQ HOSP IP/OBS MODERATE 35: CPT | Performed by: INTERNAL MEDICINE

## 2024-02-10 PROCEDURE — 85007 BL SMEAR W/DIFF WBC COUNT: CPT | Performed by: INTERNAL MEDICINE

## 2024-02-10 PROCEDURE — 25010000002 PIPERACILLIN SOD-TAZOBACTAM PER 1 G: Performed by: FAMILY MEDICINE

## 2024-02-10 PROCEDURE — 80053 COMPREHEN METABOLIC PANEL: CPT | Performed by: FAMILY MEDICINE

## 2024-02-10 PROCEDURE — 25010000002 ENOXAPARIN PER 10 MG: Performed by: FAMILY MEDICINE

## 2024-02-10 PROCEDURE — 85027 COMPLETE CBC AUTOMATED: CPT | Performed by: INTERNAL MEDICINE

## 2024-02-10 RX ORDER — AMOXICILLIN AND CLAVULANATE POTASSIUM 500; 125 MG/1; MG/1
1 TABLET, FILM COATED ORAL 3 TIMES DAILY
Qty: 6 TABLET | Refills: 0 | Status: SHIPPED | OUTPATIENT
Start: 2024-02-10 | End: 2024-02-12

## 2024-02-10 RX ORDER — AMOXICILLIN AND CLAVULANATE POTASSIUM 500; 125 MG/1; MG/1
1 TABLET, FILM COATED ORAL 3 TIMES DAILY
Status: DISCONTINUED | OUTPATIENT
Start: 2024-02-10 | End: 2024-02-10 | Stop reason: HOSPADM

## 2024-02-10 RX ADMIN — PIPERACILLIN SODIUM AND TAZOBACTAM SODIUM 3.38 G: 3; .375 INJECTION, POWDER, LYOPHILIZED, FOR SOLUTION INTRAVENOUS at 03:30

## 2024-02-10 RX ADMIN — ENOXAPARIN SODIUM 30 MG: 100 INJECTION SUBCUTANEOUS at 09:15

## 2024-02-10 RX ADMIN — Medication 10 ML: at 09:15

## 2024-02-10 RX ADMIN — PIPERACILLIN SODIUM AND TAZOBACTAM SODIUM 3.38 G: 3; .375 INJECTION, POWDER, LYOPHILIZED, FOR SOLUTION INTRAVENOUS at 10:43

## 2024-02-10 NOTE — PLAN OF CARE
Goal Outcome Evaluation:  Plan of Care Reviewed With: patient        Progress: improving  Outcome Evaluation: IID; IV ABX; no complaints of pain; Up ad marlon; void; resting betwen care; safety maintained

## 2024-02-10 NOTE — PROGRESS NOTES
" INFECTIOUS DISEASES PROGRESS NOTE    Patient:  Candice Kingsley  YOB: 1945  MRN: [id number]   Admit date: 2024   Admitting Physician: Yoan Milton MD  Primary Care Physician: Yoan Milton MD    Chief Complaint: Recurrent ascites        Interval History: Patient and has been reviewing some of the history where patient had significant abdominal distention that has been attributed to ascites.  She has been worked up by her primary provider Dr. Jae Howard who is now retired and has been seen by Dr. Tripp Beckham who she indicated was unable to reach the area endoscopically.    She overall continues to feel much better \"until I eat\".  She reports her certain foods that are worse than others.  Additionally if she eats smaller portions it is better.  She does feel like food will sit right where her abdominal scar is for a period of time until it finally moves through.    Allergies:   Allergies   Allergen Reactions    Cefdinir Unknown - High Severity    Cefuroxime Unknown - High Severity    Sulfa Antibiotics Hives    Cephalosporins Rash       Current Scheduled Medications:   enoxaparin, 30 mg, Subcutaneous, Daily  piperacillin-tazobactam, 3.375 g, Intravenous, Q8H  sodium chloride, 10 mL, Intravenous, Q12H      Current PRN Medications:    senna-docusate sodium **AND** polyethylene glycol **AND** bisacodyl **AND** bisacodyl    Calcium Replacement - Follow Nurse / BPA Driven Protocol    HYDROcodone-acetaminophen    LORazepam    Magnesium Standard Dose Replacement - Follow Nurse / BPA Driven Protocol    ondansetron    Phosphorus Replacement - Follow Nurse / BPA Driven Protocol    Potassium Replacement - Follow Nurse / BPA Driven Protocol    [COMPLETED] Insert Peripheral IV **AND** sodium chloride    sodium chloride    sodium chloride            Objective     Vital Signs:  Temp (24hrs), Av.8 °F (36.6 °C), Min:97.4 °F (36.3 °C), Max:98.6 °F (37 °C)      /67 (BP Location: Left " "arm, Patient Position: Lying)   Pulse 74   Temp 97.6 °F (36.4 °C) (Oral)   Resp 16   Ht 157.5 cm (62\")   Wt 49.9 kg (110 lb)   SpO2 95%   BMI 20.12 kg/m²         Physical Exam:  General: Patient is a pleasant 78-year-old female sitting up in bed in no acute distress.  Her  at bedside.  Respiratory: Effort even and unlabored, she is not conversationally dyspneic  Abdomen: Soft, no induration, rebound or guarding appreciated.  Neuro: Alert, oriented, speech clear  Psych: Pleasant cooperative      Results Review:    I reviewed the patient's new clinical results.    Lab Results:    CBC:   Lab Results   Lab 02/08/24  1237 02/08/24  1743 02/09/24  0451 02/10/24  0408   WBC 23.60* 20.38* 12.15* 8.00   HEMOGLOBIN 11.7* 11.7* 11.1* 10.8*   HEMATOCRIT 35.2 35.3 32.2* 32.4*   PLATELETS 149 152 144 157        AutoDiff:   Lab Results   Lab 02/08/24  1237 02/08/24  1743 02/09/24  0451 02/10/24  0408   NEUTROPHIL % 82.9* 80.7* 80.1*  --    LYMPHOCYTE % 5.8* 7.3* 8.5*  --    MONOCYTES % 9.8 10.4 10.1  --    EOSINOPHIL % 0.1* 0.1* 0.2*  --    BASOPHIL % 0.3 0.2 0.2  --    NEUTROS ABS 19.58* 16.45* 9.73* 6.40   LYMPHS ABS 1.36 1.48 1.03  --    MONOS ABS 2.32* 2.12* 1.23*  --    EOS ABS 0.02 0.02 0.03  --    BASOS ABS 0.06 0.05 0.02  --         Manual Diff:    Lab Results   Lab 02/08/24  1743 02/09/24  0451 02/10/24  0408   NEUTROPHIL %  --   --  80.0*   LYMPHOCYTE %  --   --  14.0*   MONOCYTES %  --   --  6.0   NEUTROS ABS 16.45* 9.73* 6.40   LYMPHS ABS  --   --  1.12   ANISOCYTOSIS  --   --  Slight/1+   POIKILOCYTES  --   --  Slight/1+   WBC MORPHOLOGY  --   --  Normal   PLT MORPH  --   --  Normal           CMP:   Lab Results   Lab 02/08/24  1743 02/09/24  0451 02/10/24  0408   SODIUM 132* 136 137   POTASSIUM 4.0 4.1 4.3   CHLORIDE 97* 105 107   CO2 24.0 20.0* 21.0*   BUN 35* 26* 13   CREATININE 1.50* 0.79 0.58   CALCIUM 9.1 8.4* 8.8   BILIRUBIN 1.5* 1.2 1.0   ALK PHOS 492* 426* 426*   ALT (SGPT) 50* 40* 36*   AST " (SGOT) 75* 53* 48*   GLUCOSE 55* 97 102*       Estimated Creatinine Clearance: 65 mL/min (by C-G formula based on SCr of 0.58 mg/dL).    Culture Results:    Microbiology Results (last 10 days)       Procedure Component Value - Date/Time    Blood Culture - Blood, Arm, Left [[id number]]  (Normal) Collected: 02/08/24 1801    Lab Status: Preliminary result Specimen: Blood from Arm, Left Updated: 02/09/24 1817     Blood Culture No growth at 24 hours    Respiratory Panel PCR w/COVID-19(SARS-CoV-2) TATIANNA/YOANA/TIRNITY/PAD/COR/MORALES In-House, NP Swab in UTM/VTM, 2 HR TAT - Swab, Nasopharynx [[id number]]  (Abnormal) Collected: 02/08/24 1745    Lab Status: Final result Specimen: Swab from Nasopharynx Updated: 02/08/24 1902     ADENOVIRUS, PCR Not Detected     Coronavirus 229E Not Detected     Coronavirus HKU1 Not Detected     Coronavirus NL63 Not Detected     Coronavirus OC43 Not Detected     COVID19 Not Detected     Human Metapneumovirus Not Detected     Human Rhinovirus/Enterovirus Not Detected     Influenza A PCR Not Detected     Influenza B PCR Not Detected     Parainfluenza Virus 1 Not Detected     Parainfluenza Virus 2 Not Detected     Parainfluenza Virus 3 Not Detected     Parainfluenza Virus 4 Not Detected     RSV, PCR Detected     Bordetella pertussis pcr Not Detected     Bordetella parapertussis PCR Not Detected     Chlamydophila pneumoniae PCR Not Detected     Mycoplasma pneumo by PCR Not Detected    Narrative:      In the setting of a positive respiratory panel with a viral infection PLUS a negative procalcitonin without other underlying concern for bacterial infection, consider observing off antibiotics or discontinuation of antibiotics and continue supportive care. If the respiratory panel is positive for atypical bacterial infection (Bordetella pertussis, Chlamydophila pneumoniae, or Mycoplasma pneumoniae), consider antibiotic de-escalation to target atypical bacterial infection.    Blood Culture - Blood, Arm, Right  [[id number]]  (Normal) Collected: 02/08/24 1743    Lab Status: Preliminary result Specimen: Blood from Arm, Right Updated: 02/09/24 1816     Blood Culture No growth at 24 hours                 Radiology:     CT report from December 13, 2023 noted small amount of fluid in pelvis      Imaging Results (Last 72 Hours)       Procedure Component Value Units Date/Time    CT Abdomen Pelvis With Contrast [[id number]] Collected: 02/08/24 1848     Updated: 02/08/24 1901    Narrative:      CT ABDOMEN PELVIS W CONTRAST- 2/8/2024 5:17 PM     HISTORY: distension, pain       COMPARISON: 12/13/2023.     DLP: 200.69 mGy.cm. All CT scans are performed using dose optimization  techniques as appropriate to the performed exam and including at least  one of the following: Automated exposure control, adjustment of the mA  and/or kV according to size, and the use of the iterative reconstruction  technique.     TECHNIQUE: Following the intravenous administration of contrast, helical  CT tomographic images of the abdomen and pelvis were acquired. Coronal  reformatted images were also provided for review.     FINDINGS:  Bibasilar atelectasis is present. There is mild cardiomegaly. No  pericardial effusion..     LIVER: There is pneumobilia within the left lobe of the liver related to  previous biliary diversion. A biliary stent is in place. There is mild  intrahepatic predilatation stable from the previous exam. No discrete  hepatic mass. The patient has undergone previous Whipple procedure. As  noted previously there is some soft tissue fullness in the region of the  tomas hepatis some of which represents pulled up bowel loops related to  the patient's previous Whipple procedure. Overall the soft tissue  fullness in the region of the tomas hepatis is stable. There is normal  enhancement of the portal vein and branches..     BILIARY SYSTEM: Stable mild intrahepatic biliary dilatation. Fullness in  the tomas hepatis as well as a biliary stent are  again demonstrated.  Overall stability in this finding from the previous exam of December of  last year..     PANCREAS: Mild prominence of the pancreatic duct similar to the previous  exam. The patient has undergone Whipple procedure with resection of the  pancreatic head..     SPLEEN: Unremarkable.     KIDNEYS AND ADRENALS: The adrenals are unremarkable. No discrete renal  mass. There is a nonobstructing calculus involving mid pole calyx of the  right kidney stable from the previous exam.. The ureters are  decompressed and normal in appearance.     RETROPERITONEUM: No mass, lymphadenopathy or hemorrhage. There is  atheromatous calcification of the abdominal aorta and iliac arteries  with no evidence of aneurysm.     GI TRACT: There is mild constipation. Diverticulosis is noted of the  descending and sigmoid colon with no evidence of diverticulitis.  Nonspecific fluid-filled bowel loops are present without discrete  transition point or mechanical obstruction. No focal bowel wall  thickening. No evidence of gastric wall thickening or gastric outlet  obstruction.. No evidence of acute appendicitis..     OTHER: There are some prominent nodes within the small bowel  mesentery/root of the mesentery stable from the previous exam. The  largest of these nodes is to the right of midline measuring 10 mm in  long axis as previously noted.. The abdominopelvic vasculature is  patent. The osseous structures and soft tissues demonstrate no worrisome  lesions. A small fat-containing periumbilical hernia is present. A  fat-containing left inguinal hernia is also present.     PELVIS: The uterus and adnexa are unremarkable. No free fluid in the  cul-de-sac.. The urinary bladder is normal in appearance.       Impression:      1. Status post Whipple procedure. There is pneumobilia as well as mild  intrahepatic biliary dilatation stable from the previous exam. There is  persistent soft tissue fullness in the region of the tomas hepatis  some  of which is postoperative in nature. Overall stable appearance from the  previous exam. There are again some prominent nodes within the small  bowel mesentery and at the root of the mesentery also stable. No new  mass or increasing lymphadenopathy.  2. Constipation. Nonspecific fluid-filled bowel loops are present  without evidence of a discrete transition point. This may be related to  stasis from the increased volume of fecal material within the colon.  There is diverticulosis of the sigmoid colon without diverticulitis.  3. Nonobstructing right-sided nephrolithiasis. No ureteral stone or  obstructive uropathy.  4. Fat-containing periumbilical hernia.  5. Fat-containing left inguinal hernia.  6. The uterus and adnexa are unremarkable..           This report was signed and finalized on 2/8/2024 6:57 PM by Dr. Palmer Jacobs MD.       XR Chest 1 View [[id number]] Collected: 02/08/24 1720     Updated: 02/08/24 1725    Narrative:      EXAMINATION: XR CHEST 1 VW-  2/8/2024 5:20 PM     HISTORY: Fever. Hypotension.     FINDINGS: Upright frontal projection of the chest is compared to prior  exam of 6/13/2022. The lungs are fully expanded and clear. No  consolidative pneumonia or effusion is present. The thoracic aorta is  mildly ectatic.       Impression:      1. No acute disease.     This report was signed and finalized on 2/8/2024 5:22 PM by Dr. Palmer Jacobs MD.       US Liver [[id number]] Collected: 02/08/24 1705     Updated: 02/08/24 1711    Narrative:      US LIVER-2/8/2024 3:29 PM     REASON FOR EXAM: ascites, distrension, pain       COMPARISON: NONE      TECHNIQUE: Multiple longitudinal and transverse realtime sonographic  images of the right upper quadrant of the abdomen are obtained.  These  images and the report are permanently stored in the PACS system.     FINDINGS:    Pancreas: There is mild prominence of the pancreatic duct with a  diameter of 3 mm. The patient has a history of Whipple procedure.  The  body and tail of the pancreas are grossly unremarkable..     Liver: Echogenic foci within the left lobe of the liver with dirty  shadowing is likely related to pneumobilia from previous biliary  diversion. There is mild intrahepatic bili dilatation. The liver is  homogeneous in echotexture without evidence of discrete mass. There is  no free fluid in the upper abdomen or perihepatic space. Color Doppler  demonstrates hepatopedal flow within the portal vein..     Gallbladder: The gallbladder is surgically absent..     Bile ducts: The CBD measures 0.33 cm in diameter. There is no  intrahepatic or extrahepatic ductal dilation.     Right kidney: Normal in size, shape and contour. No mass, hydronephrosis  or calculi. No perinephric fluid collection.       Other: The visualized abdominal aorta is normal in caliber.       Impression:         1. Status post cholecystectomy as well as Whipple procedure. Suspected  pneumobilia postoperative with foci of increased echogenicity within the  biliary tree in the left lobe with dirty shadowing. No significant  biliary dilatation is demonstrated. There is no free fluid in the right  upper quadrant.           This report was signed and finalized on 2/8/2024 5:08 PM by Dr. Palmer Jacobs MD.                   Active Hospital Problems    Diagnosis     **Sepsis     Cholangiocarcinoma     ADRIEN (acute kidney injury)     RSV infection        IMPRESSION:  Marked leukocytosis on admission-resolved.  Suspect biliary/abdominal source whether translocation of gut bacteria or other undiagnosed problem involving biliary tree.  Patient indicates issues off-and-on with abdominal swelling, etc. starting towards the end of 2023.  Current CT without any ascites.  December CT with small amount of free fluid in the pelvis.  Did not review all radiology studies to see if she has had documented significant ascites.  Increased alk phos/ALT, AST, bili-stable to improved  Acute kidney injury on  admission secondary to dehydration-resolved  RSV per nasal PCR swab.-Patient did have upper respiratory symptoms last week.  History of cholangiocarcinoma status post Whipple in 2011.  Patient said persistently elevated CEA and CA 19-9 without any evidence of recurrence of disease.  This is being monitored by Dr. Cee.      RECOMMENDATION:   Agree patient stable for discharge.  Has not quite had 3 full days of Zosyn.  Will place order for Augmentin to complete 5-day course.  The patient and  that shorter courses of antibiotic therapy in most cases have been shown to be as effective as longer courses.  At this point she improved significantly over a very short period of time and would like to minimize any adverse reactions to antibiotics-she is already having some diarrhea on a background of history of diarrhea since her Whipple  Will place follow-up with me as needed as I think she will be better served following up with Dr. Milton, Dr. Cee and possibly GI/her surgeon      Pura Conte MD  02/10/24  10:07 CST

## 2024-02-10 NOTE — PROGRESS NOTES
"    Daily Progress Note  Candice Kingsley  MRN: [id number] LOS: 2    Admit Date: 2024   2/10/2024 09:37 CST    Subjective:         Interval History:    Reviewed overnight events and nursing notes.     Doing well this morning she does not have significant complaint.  Feels much much improved.    ROS:  Review of Systems   Constitutional:  Negative for chills and fever.   Respiratory:  Negative for cough, chest tightness and shortness of breath.    Cardiovascular:  Negative for chest pain.   Gastrointestinal:  Negative for abdominal pain, diarrhea, nausea and vomiting.       DIET:  Diet: Regular/House Diet; Texture: Regular Texture (IDDSI 7); Fluid Consistency: Thin (IDDSI 0)    Medications:      enoxaparin, 30 mg, Subcutaneous, Daily  piperacillin-tazobactam, 3.375 g, Intravenous, Q8H  sodium chloride, 10 mL, Intravenous, Q12H          Objective:     Vitals: /67 (BP Location: Left arm, Patient Position: Lying)   Pulse 74   Temp 97.6 °F (36.4 °C) (Oral)   Resp 16   Ht 157.5 cm (62\")   Wt 49.9 kg (110 lb)   SpO2 95%   BMI 20.12 kg/m²    Intake/Output Summary (Last 24 hours) at 2/10/2024 0937  Last data filed at 2/10/2024 0446  Gross per 24 hour   Intake 480 ml   Output 900 ml   Net -420 ml    Temp (24hrs), Av.8 °F (36.6 °C), Min:97.4 °F (36.3 °C), Max:98.6 °F (37 °C)    Glucose:  Lab Results   Component Value Date    POCGLU 52 (L) 2024     Physical Examination:   Physical Exam  Constitutional:       General: She is not in acute distress.     Appearance: Normal appearance. She is not ill-appearing or toxic-appearing.   Cardiovascular:      Rate and Rhythm: Normal rate and regular rhythm.      Pulses: Normal pulses.      Heart sounds: Normal heart sounds. No murmur heard.  Pulmonary:      Effort: Pulmonary effort is normal. No respiratory distress.      Breath sounds: Normal breath sounds. No stridor. No wheezing or rales.   Abdominal:      General: Abdomen is flat. Bowel sounds are normal. " There is no distension.      Palpations: Abdomen is soft.      Tenderness: There is no abdominal tenderness.   Neurological:      Mental Status: She is alert.         Labs:  Lab Results (last 24 hours)       Procedure Component Value Units Date/Time    CBC & Differential [[id number]]  (Abnormal) Collected: 02/10/24 0408    Specimen: Blood Updated: 02/10/24 0546    Narrative:      The following orders were created for panel order CBC & Differential.  Procedure                               Abnormality         Status                     ---------                               -----------         ------                     Manual Differential[[id number]]          Abnormal            Final result               CBC Auto Differential[[id number]]        Abnormal            Final result                 Please view results for these tests on the individual orders.    Manual Differential [[id number]]  (Abnormal) Collected: 02/10/24 0408    Specimen: Blood Updated: 02/10/24 0546     Neutrophil % 80.0 %      Lymphocyte % 14.0 %      Monocyte % 6.0 %      Neutrophils Absolute 6.40 10*3/mm3      Lymphocytes Absolute 1.12 10*3/mm3      Monocytes Absolute 0.48 10*3/mm3      Anisocytosis Slight/1+     Crenated RBC's Slight/1+     Poikilocytes Slight/1+     WBC Morphology Normal     Platelet Morphology Normal    CBC Auto Differential [[id number]]  (Abnormal) Collected: 02/10/24 0408    Specimen: Blood Updated: 02/10/24 0546     WBC 8.00 10*3/mm3      RBC 3.42 10*6/mm3      Hemoglobin 10.8 g/dL      Hematocrit 32.4 %      MCV 94.7 fL      MCH 31.6 pg      MCHC 33.3 g/dL      RDW 15.1 %      RDW-SD 52.5 fl      MPV 11.8 fL      Platelets 157 10*3/mm3     Comprehensive Metabolic Panel [[id number]]  (Abnormal) Collected: 02/10/24 0408    Specimen: Blood Updated: 02/10/24 0542     Glucose 102 mg/dL      BUN 13 mg/dL      Creatinine 0.58 mg/dL      Sodium 137 mmol/L      Potassium 4.3 mmol/L      Chloride 107 mmol/L      CO2 21.0 mmol/L       Calcium 8.8 mg/dL      Total Protein 6.3 g/dL      Albumin 3.2 g/dL      ALT (SGPT) 36 U/L      AST (SGOT) 48 U/L      Alkaline Phosphatase 426 U/L      Total Bilirubin 1.0 mg/dL      Globulin 3.1 gm/dL      A/G Ratio 1.0 g/dL      BUN/Creatinine Ratio 22.4     Anion Gap 9.0 mmol/L      eGFR 93.9 mL/min/1.73     Narrative:      GFR Normal >60  Chronic Kidney Disease <60  Kidney Failure <15    The GFR formula is only valid for adults with stable renal function between ages 18 and 70.    CEA [[id number]] Collected: 02/08/24 1743    Specimen: Blood Updated: 02/09/24 1957     CEA 7.78 ng/mL     Narrative:      CEA Reference Range:    Non Smokers:   Less than 3 ng/mL  Smokers:       Less than 5 ng/mL  Results may be falsely decreased if patient taking Biotin.    Testing Method: [employer] Electrochemiluminescence Immunoassay(ECLIA)  Values obtained with different assay methods or kits cannot be used interchangeably.    Cancer Antigen 19-9 [[id number]]  (Abnormal) Collected: 02/08/24 1743    Specimen: Blood Updated: 02/09/24 1956     CA 19-9 291.0 U/mL     Narrative:      Results may be falsely decreased if patient taking Biotin.    Testing Method: [employer] Electrochemiluminescence Immunoassay(ECLIA)  Values obtained with different assay methods or kits cannot be used interchangeably.    Blood Culture - Blood, Arm, Left [[id number]]  (Normal) Collected: 02/08/24 1801    Specimen: Blood from Arm, Left Updated: 02/09/24 1817     Blood Culture No growth at 24 hours    Blood Culture - Blood, Arm, Right [[id number]]  (Normal) Collected: 02/08/24 1743    Specimen: Blood from Arm, Right Updated: 02/09/24 1816     Blood Culture No growth at 24 hours             Imaging:  CT Abdomen Pelvis With Contrast    Result Date: 2/8/2024  CT ABDOMEN PELVIS W CONTRAST- 2/8/2024 5:17 PM  HISTORY: distension, pain   COMPARISON: 12/13/2023.  DLP: 200.69 mGy.cm. All CT scans are performed using dose optimization techniques as  appropriate to the performed exam and including at least one of the following: Automated exposure control, adjustment of the mA and/or kV according to size, and the use of the iterative reconstruction technique.  TECHNIQUE: Following the intravenous administration of contrast, helical CT tomographic images of the abdomen and pelvis were acquired. Coronal reformatted images were also provided for review.  FINDINGS: Bibasilar atelectasis is present. There is mild cardiomegaly. No pericardial effusion..  LIVER: There is pneumobilia within the left lobe of the liver related to previous biliary diversion. A biliary stent is in place. There is mild intrahepatic predilatation stable from the previous exam. No discrete hepatic mass. The patient has undergone previous Whipple procedure. As noted previously there is some soft tissue fullness in the region of the tomas hepatis some of which represents pulled up bowel loops related to the patient's previous Whipple procedure. Overall the soft tissue fullness in the region of the tomas hepatis is stable. There is normal enhancement of the portal vein and branches..  BILIARY SYSTEM: Stable mild intrahepatic biliary dilatation. Fullness in the tomas hepatis as well as a biliary stent are again demonstrated. Overall stability in this finding from the previous exam of December of last year..  PANCREAS: Mild prominence of the pancreatic duct similar to the previous exam. The patient has undergone Whipple procedure with resection of the pancreatic head..  SPLEEN: Unremarkable.  KIDNEYS AND ADRENALS: The adrenals are unremarkable. No discrete renal mass. There is a nonobstructing calculus involving mid pole calyx of the right kidney stable from the previous exam.. The ureters are decompressed and normal in appearance.  RETROPERITONEUM: No mass, lymphadenopathy or hemorrhage. There is atheromatous calcification of the abdominal aorta and iliac arteries with no evidence of aneurysm.  GI  TRACT: There is mild constipation. Diverticulosis is noted of the descending and sigmoid colon with no evidence of diverticulitis. Nonspecific fluid-filled bowel loops are present without discrete transition point or mechanical obstruction. No focal bowel wall thickening. No evidence of gastric wall thickening or gastric outlet obstruction.. No evidence of acute appendicitis..  OTHER: There are some prominent nodes within the small bowel mesentery/root of the mesentery stable from the previous exam. The largest of these nodes is to the right of midline measuring 10 mm in long axis as previously noted.. The abdominopelvic vasculature is patent. The osseous structures and soft tissues demonstrate no worrisome lesions. A small fat-containing periumbilical hernia is present. A fat-containing left inguinal hernia is also present.  PELVIS: The uterus and adnexa are unremarkable. No free fluid in the cul-de-sac.. The urinary bladder is normal in appearance.      Impression: 1. Status post Whipple procedure. There is pneumobilia as well as mild intrahepatic biliary dilatation stable from the previous exam. There is persistent soft tissue fullness in the region of the tomas hepatis some of which is postoperative in nature. Overall stable appearance from the previous exam. There are again some prominent nodes within the small bowel mesentery and at the root of the mesentery also stable. No new mass or increasing lymphadenopathy. 2. Constipation. Nonspecific fluid-filled bowel loops are present without evidence of a discrete transition point. This may be related to stasis from the increased volume of fecal material within the colon. There is diverticulosis of the sigmoid colon without diverticulitis. 3. Nonobstructing right-sided nephrolithiasis. No ureteral stone or obstructive uropathy. 4. Fat-containing periumbilical hernia. 5. Fat-containing left inguinal hernia. 6. The uterus and adnexa are unremarkable..    This report  was signed and finalized on 2/8/2024 6:57 PM by Dr. Palmer Jacobs MD.      XR Chest 1 View    Result Date: 2/8/2024  EXAMINATION: XR CHEST 1 VW-  2/8/2024 5:20 PM  HISTORY: Fever. Hypotension.  FINDINGS: Upright frontal projection of the chest is compared to prior exam of 6/13/2022. The lungs are fully expanded and clear. No consolidative pneumonia or effusion is present. The thoracic aorta is mildly ectatic.      Impression: 1. No acute disease.  This report was signed and finalized on 2/8/2024 5:22 PM by Dr. Palmer Jacobs MD.      US Liver    Result Date: 2/8/2024  US LIVER-2/8/2024 3:29 PM  REASON FOR EXAM: ascites, distrension, pain   COMPARISON: NONE  TECHNIQUE: Multiple longitudinal and transverse realtime sonographic images of the right upper quadrant of the abdomen are obtained.  These images and the report are permanently stored in the PACS system.  FINDINGS:  Pancreas: There is mild prominence of the pancreatic duct with a diameter of 3 mm. The patient has a history of Whipple procedure. The body and tail of the pancreas are grossly unremarkable..  Liver: Echogenic foci within the left lobe of the liver with dirty shadowing is likely related to pneumobilia from previous biliary diversion. There is mild intrahepatic bili dilatation. The liver is homogeneous in echotexture without evidence of discrete mass. There is no free fluid in the upper abdomen or perihepatic space. Color Doppler demonstrates hepatopedal flow within the portal vein..  Gallbladder: The gallbladder is surgically absent..  Bile ducts: The CBD measures 0.33 cm in diameter. There is no intrahepatic or extrahepatic ductal dilation.  Right kidney: Normal in size, shape and contour. No mass, hydronephrosis or calculi. No perinephric fluid collection.   Other: The visualized abdominal aorta is normal in caliber.      Impression:  1. Status post cholecystectomy as well as Whipple procedure. Suspected pneumobilia postoperative with foci of  increased echogenicity within the biliary tree in the left lobe with dirty shadowing. No significant biliary dilatation is demonstrated. There is no free fluid in the right upper quadrant.    This report was signed and finalized on 2/8/2024 5:08 PM by Dr. Palmer Jacobs MD.          Assessment and Plan:     Primary Problem:  Sepsis    Hospital Problem list:    Sepsis    Cholangiocarcinoma    ADRIEN (acute kidney injury)    RSV infection      Assessment: 78-year-old female with past medical history of cholangiocarcinoma who presented for hypotension and concern for sepsis.    Plan:    Sepsis  Continue Zosyn, clinically improving, infectious is following.  Culture data unrevealing thus far.  She is doing much better.  We discussed the possibility of discharge I think she can go home if infectious disease is okay with that and has an outpatient antibiotic plan, otherwise we can observe culture data another day.    Acute kidney injury, resolved  Most likely due to prerenal secondary to sepsis.  Resolved with fluid resuscitation, will monitor.    History of cholangiocarcinoma  Elevated cancer marker.  Will recheck this after infection is been treated as the elevation could be due to inflammation.    Positive RSV test  Residual RSV test, nothing to do.      Discharge planning:   Home    Reviewed treatment plans with the patient and/or family.     Code Status:   Code Status and Medical Interventions:   Ordered at: 02/08/24 1933     Code Status (Patient has no pulse and is not breathing):    CPR (Attempt to Resuscitate)     Medical Interventions (Patient has pulse or is breathing):    Full Support       Electronically signed by Seth Moralez MD on 2/10/2024 at 09:37 CST

## 2024-02-10 NOTE — PROGRESS NOTES
Progress Note    Patient name: Candice Kingsley  Patient : 1945  VISIT # [id number]  MR #[id number]  Room:     Subjective   Feeling much better this morning.    Stools are loose, at baseline with chronic  since Whipple procedure in   Afebrile    HISTORY OF PRESENT ILLNESS:       Candice Kingsley is a 78-year-old  female with a remote history of cholangiocarcinoma treated with a Whipple procedure by Dr. Lucas Andrews in , followed by adjuvant radiation therapy (Dr. Jack Schreiber) and concurrent radiosensitizing chemotherapy with Xeloda (Dr. Dashawn Nix).   She has had very irregular monitoring and follow-up both here in [place] and in [place] without evidence of recurrent disease.  Recent workup in the last couple of months includes CT imaging, EGD with EUS and sampling of peritoneal fluid, PET scan have failed to show evidence of new disease.  She does have a persistently elevated CEA and CA 19-9, but without evidence of new disease.  Candice was evaluated in the ED at [facility] on 2024 due to increasing fatigue, weakness, sinus symptoms, abdominal discomfort, leukocytosis with a WBC of 23.6 and a left shift, elevated CRP of 13.3 and a procalcitonin of 49.49.  A respiratory panel by PCR documented RSV.  She was admitted for IV antibiotics with Vancomycin/Zosyn, IV fluids.    Medical oncology consultation requested and continuity of care. (See full consult from 2024 for detailed tumor history review)      REVIEW OF SYSTEMS:   History obtained from chart review and the patient  General: positive for  - fatigue   ENT: negative for - oral lesions  Allergy and Immunology: negative   Hematological and Lymphatic:negative for - weight loss  Respiratory: negative for - cough and shortness of breath  Cardiovascular: negative for - chest pain or palpitations  Gastrointestinal: positive for - appetite loss, diarrhea improving  Genito-Urinary: negative for - dysuria or  hematuria  Musculoskeletal: positive for - generalized weakness  Neurological: negative for - confusion, dizziness or headaches  Dermatological: negative for - pruritus and rash    Objective     Vital Signs  Temp:  [97.4 °F (36.3 °C)-98.6 °F (37 °C)] 97.6 °F (36.4 °C)  Heart Rate:  [57-73] 61  Resp:  [16] 16  BP: (106-140)/(54-73) 140/73    Intake/Output Summary (Last 24 hours) at 2/10/2024 0710  Last data filed at 2/10/2024 0446  Gross per 24 hour   Intake 820 ml   Output 900 ml   Net -80 ml       PHYSICAL EXAM:  General Appearance: Alert, cooperative, in no acute distress  Head: Normocephalic, without obvious abnormality, atraumatic  Eyes: Normal conjunctivae and sclerae, anicteric  Ears: Ears appear intact with no abnormalities noted, hearing grossly normal  Throat: No oral lesions, no thrush, oral mucosa moist  Neck: No adenopathy, supple, trachea midline, no JVD  Lungs: Clear to auscultation, respirations regular, even and unlabored  Heart: Regular rhythm and normal rate, no murmurs, gallops or rubs  Abdomen: Normal bowel sounds, no masses, no organomegaly, soft non-tender, non-distended  Genitalia: Deferred  Extremities: Moves all extremities equally well, no edema, no cyanosis, no redness  Skin: No bleeding, bruising or rash  Lymph nodes: No palpable adenopathy  Neurologic: Grossly intact though not formally tested        CBC  Results from last 7 days   Lab Units 02/10/24  0408 02/09/24  0451 02/08/24  1743   WBC 10*3/mm3 8.00 12.15* 20.38*   HEMOGLOBIN g/dL 10.8* 11.1* 11.7*   HEMATOCRIT % 32.4* 32.2* 35.3   PLATELETS 10*3/mm3 157 144 152   LYMPHOCYTE % % 14.0*  --   --    MONOCYTES % % 6.0  --   --        CMP  Lab Results   Component Value Date    GLUCOSE 102 (H) 02/10/2024    BUN 13 02/10/2024    CREATININE 0.58 02/10/2024    EGFRIFNONA 121 02/10/2022    BCR 22.4 02/10/2024    CO2 21.0 (L) 02/10/2024    CALCIUM 8.8 02/10/2024    ALBUMIN 3.2 (L) 02/10/2024    AST 48 (H) 02/10/2024    ALT 36 (H) 02/10/2024        Results from last 7 days   Lab Units 02/08/24  1743   INR  1.12*   APTT seconds 35.0       Blood Culture   Date Value Ref Range Status   02/08/2024 No growth at 24 hours  Preliminary   02/08/2024 No growth at 24 hours  Preliminary       Imaging Results (Last 72 Hours)       Procedure Component Value Units Date/Time    CT Abdomen Pelvis With Contrast [[id number]] Collected: 02/08/24 1848     Updated: 02/08/24 1901    Narrative:      CT ABDOMEN PELVIS W CONTRAST- 2/8/2024 5:17 PM     HISTORY: distension, pain       COMPARISON: 12/13/2023.     DLP: 200.69 mGy.cm. All CT scans are performed using dose optimization  techniques as appropriate to the performed exam and including at least  one of the following: Automated exposure control, adjustment of the mA  and/or kV according to size, and the use of the iterative reconstruction  technique.     TECHNIQUE: Following the intravenous administration of contrast, helical  CT tomographic images of the abdomen and pelvis were acquired. Coronal  reformatted images were also provided for review.     FINDINGS:  Bibasilar atelectasis is present. There is mild cardiomegaly. No  pericardial effusion..     LIVER: There is pneumobilia within the left lobe of the liver related to  previous biliary diversion. A biliary stent is in place. There is mild  intrahepatic predilatation stable from the previous exam. No discrete  hepatic mass. The patient has undergone previous Whipple procedure. As  noted previously there is some soft tissue fullness in the region of the  tomas hepatis some of which represents pulled up bowel loops related to  the patient's previous Whipple procedure. Overall the soft tissue  fullness in the region of the tomas hepatis is stable. There is normal  enhancement of the portal vein and branches..     BILIARY SYSTEM: Stable mild intrahepatic biliary dilatation. Fullness in  the tomas hepatis as well as a biliary stent are again demonstrated.  Overall stability  in this finding from the previous exam of December of  last year..     PANCREAS: Mild prominence of the pancreatic duct similar to the previous  exam. The patient has undergone Whipple procedure with resection of the  pancreatic head..     SPLEEN: Unremarkable.     KIDNEYS AND ADRENALS: The adrenals are unremarkable. No discrete renal  mass. There is a nonobstructing calculus involving mid pole calyx of the  right kidney stable from the previous exam.. The ureters are  decompressed and normal in appearance.     RETROPERITONEUM: No mass, lymphadenopathy or hemorrhage. There is  atheromatous calcification of the abdominal aorta and iliac arteries  with no evidence of aneurysm.     GI TRACT: There is mild constipation. Diverticulosis is noted of the  descending and sigmoid colon with no evidence of diverticulitis.  Nonspecific fluid-filled bowel loops are present without discrete  transition point or mechanical obstruction. No focal bowel wall  thickening. No evidence of gastric wall thickening or gastric outlet  obstruction.. No evidence of acute appendicitis..     OTHER: There are some prominent nodes within the small bowel  mesentery/root of the mesentery stable from the previous exam. The  largest of these nodes is to the right of midline measuring 10 mm in  long axis as previously noted.. The abdominopelvic vasculature is  patent. The osseous structures and soft tissues demonstrate no worrisome  lesions. A small fat-containing periumbilical hernia is present. A  fat-containing left inguinal hernia is also present.     PELVIS: The uterus and adnexa are unremarkable. No free fluid in the  cul-de-sac.. The urinary bladder is normal in appearance.       Impression:      1. Status post Whipple procedure. There is pneumobilia as well as mild  intrahepatic biliary dilatation stable from the previous exam. There is  persistent soft tissue fullness in the region of the tomas hepatis some  of which is postoperative in  nature. Overall stable appearance from the  previous exam. There are again some prominent nodes within the small  bowel mesentery and at the root of the mesentery also stable. No new  mass or increasing lymphadenopathy.  2. Constipation. Nonspecific fluid-filled bowel loops are present  without evidence of a discrete transition point. This may be related to  stasis from the increased volume of fecal material within the colon.  There is diverticulosis of the sigmoid colon without diverticulitis.  3. Nonobstructing right-sided nephrolithiasis. No ureteral stone or  obstructive uropathy.  4. Fat-containing periumbilical hernia.  5. Fat-containing left inguinal hernia.  6. The uterus and adnexa are unremarkable..           This report was signed and finalized on 2/8/2024 6:57 PM by Dr. Palmer Jacobs MD.       XR Chest 1 View [[id number]] Collected: 02/08/24 1720     Updated: 02/08/24 1725    Narrative:      EXAMINATION: XR CHEST 1 VW-  2/8/2024 5:20 PM     HISTORY: Fever. Hypotension.     FINDINGS: Upright frontal projection of the chest is compared to prior  exam of 6/13/2022. The lungs are fully expanded and clear. No  consolidative pneumonia or effusion is present. The thoracic aorta is  mildly ectatic.       Impression:      1. No acute disease.     This report was signed and finalized on 2/8/2024 5:22 PM by Dr. Palmer Jacobs MD.       US Liver [[id number]] Collected: 02/08/24 1705     Updated: 02/08/24 1711    Narrative:      US LIVER-2/8/2024 3:29 PM     REASON FOR EXAM: ascites, distrension, pain       COMPARISON: NONE      TECHNIQUE: Multiple longitudinal and transverse realtime sonographic  images of the right upper quadrant of the abdomen are obtained.  These  images and the report are permanently stored in the PACS system.     FINDINGS:    Pancreas: There is mild prominence of the pancreatic duct with a  diameter of 3 mm. The patient has a history of Whipple procedure. The  body and tail of the pancreas  are grossly unremarkable..     Liver: Echogenic foci within the left lobe of the liver with dirty  shadowing is likely related to pneumobilia from previous biliary  diversion. There is mild intrahepatic bili dilatation. The liver is  homogeneous in echotexture without evidence of discrete mass. There is  no free fluid in the upper abdomen or perihepatic space. Color Doppler  demonstrates hepatopedal flow within the portal vein..     Gallbladder: The gallbladder is surgically absent..     Bile ducts: The CBD measures 0.33 cm in diameter. There is no  intrahepatic or extrahepatic ductal dilation.     Right kidney: Normal in size, shape and contour. No mass, hydronephrosis  or calculi. No perinephric fluid collection.       Other: The visualized abdominal aorta is normal in caliber.       Impression:         1. Status post cholecystectomy as well as Whipple procedure. Suspected  pneumobilia postoperative with foci of increased echogenicity within the  biliary tree in the left lobe with dirty shadowing. No significant  biliary dilatation is demonstrated. There is no free fluid in the right  upper quadrant.           This report was signed and finalized on 2/8/2024 5:08 PM by Dr. Palmer Jacobs MD.                 Assessment & Plan       Sepsis    Cholangiocarcinoma    ADRIEN (acute kidney injury)    RSV infection    #1  History of cholangiocarcinoma-in remission     Candice Kingsley is a 78-year-old  female with a remote history of cholangiocarcinoma treated with a Whipple procedure by Dr. Lucas Andrews in 2011, followed by adjuvant radiation therapy (Dr. Jack Schreiber) and concurrent radiosensitizing chemotherapy with Xeloda (Dr. Dashawn Nix).   She has had very irregular monitoring and follow-up both here in [place] and in [place] without evidence of recurrent disease.  Recent workup in the last couple of months includes CT imaging, EGD with EUS and sampling of peritoneal fluid, PET scan have failed  to show evidence of new disease.    She does have a persistently elevated CEA and CA 19-9, but without evidence of new disease.     CT scan of the abdomen and pelvis at [facility] with contrast on 2/8/2024  IMPRESSION:  1. Status post Whipple procedure. There is pneumobilia as well as mild  intrahepatic biliary dilatation stable from the previous exam. There is  persistent soft tissue fullness in the region of the tomas hepatis some  of which is postoperative in nature. Overall stable appearance from the  previous exam. There are again some prominent nodes within the small  bowel mesentery and at the root of the mesentery also stable. No new  mass or increasing lymphadenopathy.  2. Constipation. Nonspecific fluid-filled bowel loops are present  without evidence of a discrete transition point. This may be related to  stasis from the increased volume of fecal material within the colon.  There is diverticulosis of the sigmoid colon without diverticulitis.  3. Nonobstructing right-sided nephrolithiasis. No ureteral stone or  obstructive uropathy.  4. Fat-containing periumbilical hernia.  5. Fat-containing left inguinal hernia.  6. The uterus and adnexa are unremarkable..         I would recommend repeating tumor markers about a week after completion of IV antibiotics and stabilization.  Elevated CA 19-9 tumor marker can on occasion be elevated due to inflammatory processes in the biliary tree.  For example, elevated CA 19-9 levels are frequently is elevated and common bile duct diverticulum's.  Given her manipulations of the biliary tree in the past, CA 19-9 level excursions up and down need to continue to be monitored.     #2  Leukocytosis secondary to sepsis and RSV infection     Candice was evaluated in the ED at [facility] on 2/8/2024 due to increasing fatigue, weakness, sinus symptoms, abdominal discomfort, leukocytosis with a WBC of 23.6 and a left shift, elevated CRP of 13.3 and a procalcitonin of 49.49.  LFTs on 2/8/2024 were  abnormal with an alkaline phosphatase of 473, SGOT of 86, ALT of 51 with a total bilirubin of 1.7.  A respiratory panel by PCR documented RSV.  She was admitted for IV antibiotics with Zosyn, IV fluids.  Agree with continuation of IV antibiotics.     CBC this morning, 2/9/2024 has a WBC of 12.15, hemoglobin 11.1 and a platelet count of 144,000.  CBC is improved, likely secondary to IV antibiotics.  Agree with continuation of IV antibiotics for sepsis, she could have an abdominal process (SBP?) that is being treated in addition to the RSV and sinusitis.    Dr. Shira Burgos's note reviewed and appreciated        WBCs responding nicely to IV antibiotics    #3  Acute Kidney Injury (ADRIEN)          Kidney function improving with antibiotics and hydration      Kendall Oswald MD  02/10/24  07:10 CST

## 2024-02-11 ENCOUNTER — READMISSION MANAGEMENT (OUTPATIENT)
Dept: CALL CENTER | Facility: HOSPITAL | Age: 79
End: 2024-02-11
Payer: MEDICARE

## 2024-02-11 NOTE — OUTREACH NOTE
Prep Survey      Flowsheet Row Responses   [facility]abaptist facility patient discharged from? [facility]   Is LACE score < 7 ? No   Eligibility Readm Mgmt   Discharge diagnosis Sepsis   Does the patient have one of the following disease processes/diagnoses(primary or secondary)? Sepsis   Does the patient have Home health ordered? No   Is there a DME ordered? No   Prep survey completed? Yes            Kath IVAN - Registered Nurse

## 2024-02-11 NOTE — DISCHARGE SUMMARY
Hospital Discharge Summary    Candice Kingsley  :  1945  MRN:  [id number]    Admit date:  2024  Discharge date:  2/10/2024    Admitting Physician:    Yoan Milton MD    Discharge Diagnoses:      Sepsis    Cholangiocarcinoma    ADRIEN (acute kidney injury)    RSV infection      Hospital Course:       Ms. Kingsley is a 78-year-old female with remote history of cholangiocarcinoma who presented to Dr. Milton for general malaise, new onset ascites, and weakness.  Labs at that time showed an elevated tumor marker and significant leukocytosis so she was admitted for further workup and possible sepsis.  She was initially started on broad-spectrum antibiotics and no clear source of infection was found.  She did test positive for RSV but this is not consistent with her symptoms and this is reportedly a persistently positive test.  She clinically improved with antibiotics and will be discharged home on a course of Augmentin for 5 days per infectious disease recommendations.    Source infections not immediately clear but I suspect intra-abdominal bacterial translocation is most likely.  She will need to do a TCM follow-up with Dr. Milton and have a repeat cancer biomarker drawn as inflammation can falsely elevate this.      Discharge Medications:         Discharge Medications        New Medications        Instructions Start Date   amoxicillin-clavulanate 500-125 MG per tablet  Commonly known as: AUGMENTIN   500 mg, Oral, 3 Times Daily             Continue These Medications        Instructions Start Date   diphenoxylate-atropine 2.5-0.025 MG per tablet  Commonly known as: LOMOTIL   1 tablet, Oral, 4 Times Daily PRN      furosemide 20 MG tablet  Commonly known as: LASIX   20 mg, Oral, Daily      lisinopril 5 MG tablet  Commonly known as: PRINIVIL,ZESTRIL   5 mg, Oral, Daily      oxyCODONE-acetaminophen  MG per tablet  Commonly known as: PERCOCET   1 tablet, Oral, Every 6 Hours PRN       spironolactone 50 MG tablet  Commonly known as: ALDACTONE   50 mg, Oral, Daily      vitamin D 1.25 MG (34244 UT) capsule capsule  Commonly known as: ERGOCALCIFEROL   50,000 Units, Oral, Weekly, sundays               Significant Diagnostic Studies:      CT Abdomen Pelvis With Contrast    Result Date: 2/8/2024  1. Status post Whipple procedure. There is pneumobilia as well as mild intrahepatic biliary dilatation stable from the previous exam. There is persistent soft tissue fullness in the region of the tomas hepatis some of which is postoperative in nature. Overall stable appearance from the previous exam. There are again some prominent nodes within the small bowel mesentery and at the root of the mesentery also stable. No new mass or increasing lymphadenopathy. 2. Constipation. Nonspecific fluid-filled bowel loops are present without evidence of a discrete transition point. This may be related to stasis from the increased volume of fecal material within the colon. There is diverticulosis of the sigmoid colon without diverticulitis. 3. Nonobstructing right-sided nephrolithiasis. No ureteral stone or obstructive uropathy. 4. Fat-containing periumbilical hernia. 5. Fat-containing left inguinal hernia. 6. The uterus and adnexa are unremarkable..    This report was signed and finalized on 2/8/2024 6:57 PM by Dr. Palmer Jacobs MD.      XR Chest 1 View    Result Date: 2/8/2024  1. No acute disease.  This report was signed and finalized on 2/8/2024 5:22 PM by Dr. Palmer Jacobs MD.      US Liver    Result Date: 2/8/2024   1. Status post cholecystectomy as well as Whipple procedure. Suspected pneumobilia postoperative with foci of increased echogenicity within the biliary tree in the left lobe with dirty shadowing. No significant biliary dilatation is demonstrated. There is no free fluid in the right upper quadrant.    This report was signed and finalized on 2/8/2024 5:08 PM by Dr. Palmer Jacobs MD.         Disposition:   Home  Follow up with Yoan Milton MD in 1 weeks.    Signed:  Seth Moralez MD   2/11/2024, 10:42 CST

## 2024-02-13 LAB
BACTERIA SPEC AEROBE CULT: NORMAL
BACTERIA SPEC AEROBE CULT: NORMAL

## 2024-02-15 ENCOUNTER — READMISSION MANAGEMENT (OUTPATIENT)
Dept: CALL CENTER | Facility: HOSPITAL | Age: 79
End: 2024-02-15
Payer: MEDICARE

## 2024-02-21 ENCOUNTER — HOSPITAL ENCOUNTER (OUTPATIENT)
Dept: INFUSION THERAPY | Age: 79
Discharge: HOME | End: 2024-02-21
Payer: MEDICARE

## 2024-02-21 DIAGNOSIS — C22.1 CHOLANGIOCARCINOMA (HCC): ICD-10-CM

## 2024-02-21 DIAGNOSIS — R70.1 ABNORMAL PLASMA VISCOSITY: ICD-10-CM

## 2024-02-21 LAB
ALBUMIN SERPL-MCNC: 3.3 G/DL (ref 3.5–5.2)
ALP SERPL-CCNC: 384 U/L (ref 35–104)
ALT SERPL-CCNC: 43 U/L (ref 9–52)
ANION GAP SERPL CALCULATED.3IONS-SCNC: 12 MMOL/L (ref 7–19)
AST SERPL-CCNC: 69 U/L (ref 14–36)
BASOPHILS # BLD: 0.04 K/UL (ref 0.01–0.08)
BASOPHILS NFR BLD: 0.4 % (ref 0.1–1.2)
BILIRUB SERPL-MCNC: 1.5 MG/DL (ref 0.2–1.3)
BUN SERPL-MCNC: 14 MG/DL (ref 7–17)
CALCIUM SERPL-MCNC: 8.7 MG/DL (ref 8.4–10.2)
CANCER AG19-9 SERPL-ACNC: 552 U/ML (ref 0–35)
CEA SERPL-MCNC: 6.4 NG/ML (ref 0–4.7)
CHLORIDE SERPL-SCNC: 106 MMOL/L (ref 98–111)
CO2 SERPL-SCNC: 19 MMOL/L (ref 22–29)
CREAT SERPL-MCNC: 0.6 MG/DL (ref 0.5–1)
EOSINOPHIL # BLD: 0.03 K/UL (ref 0.04–0.54)
EOSINOPHIL NFR BLD: 0.3 % (ref 0.7–7)
ERYTHROCYTE [DISTWIDTH] IN BLOOD BY AUTOMATED COUNT: 14.6 % (ref 11.7–14.4)
GLOBULIN: 3.3 G/DL
GLUCOSE SERPL-MCNC: 213 MG/DL (ref 74–106)
HCT VFR BLD AUTO: 34 % (ref 34.1–44.9)
HGB BLD-MCNC: 11.8 G/DL (ref 11.2–15.7)
LYMPHOCYTES # BLD: 1.03 K/UL (ref 1.18–3.74)
LYMPHOCYTES NFR BLD: 11.6 % (ref 19.3–53.1)
MCH RBC QN AUTO: 31.5 PG (ref 25.6–32.2)
MCHC RBC AUTO-ENTMCNC: 34.7 G/DL (ref 32.3–35.5)
MCV RBC AUTO: 90.7 FL (ref 79.4–94.8)
MONOCYTES # BLD: 0.46 K/UL (ref 0.24–0.82)
MONOCYTES NFR BLD: 5.2 % (ref 4.7–12.5)
NEUTROPHILS # BLD: 7.29 K/UL (ref 1.56–6.13)
NEUTS SEG NFR BLD: 81.9 % (ref 34–71.1)
PLATELET # BLD AUTO: 183 K/UL (ref 182–369)
PMV BLD AUTO: 11.1 FL (ref 7.4–10.4)
POTASSIUM SERPL-SCNC: 3.8 MMOL/L (ref 3.5–5.1)
PROT SERPL-MCNC: 6.7 G/DL (ref 6.3–8.2)
RBC # BLD AUTO: 3.75 M/UL (ref 3.93–5.22)
SODIUM SERPL-SCNC: 137 MMOL/L (ref 137–145)
WBC # BLD AUTO: 8.9 K/UL (ref 3.98–10.04)

## 2024-02-21 PROCEDURE — 80053 COMPREHEN METABOLIC PANEL: CPT

## 2024-02-21 PROCEDURE — 36415 COLL VENOUS BLD VENIPUNCTURE: CPT

## 2024-02-21 PROCEDURE — 85025 COMPLETE CBC W/AUTO DIFF WBC: CPT

## 2024-02-23 ENCOUNTER — READMISSION MANAGEMENT (OUTPATIENT)
Dept: CALL CENTER | Facility: HOSPITAL | Age: 79
End: 2024-02-23
Payer: MEDICARE

## 2024-02-23 NOTE — OUTREACH NOTE
Sepsis Week 2 Survey      Flowsheet Row Responses   Hindu facility patient discharged from? [facility]   Does the patient have one of the following disease processes/diagnoses(primary or secondary)? Sepsis   Week 2 attempt successful? No   Unsuccessful attempts Attempt 1            Briana Mckeon Registered Nurse

## 2024-02-26 ENCOUNTER — READMISSION MANAGEMENT (OUTPATIENT)
Dept: CALL CENTER | Facility: HOSPITAL | Age: 79
End: 2024-02-26
Payer: MEDICARE

## 2024-02-26 ENCOUNTER — TELEPHONE (OUTPATIENT)
Dept: HEMATOLOGY | Age: 79
End: 2024-02-26

## 2024-02-26 NOTE — OUTREACH NOTE
Sepsis Week 2 Survey      Flowsheet Row Responses   Roman [employer] facility patient discharged from? [facility]   Does the patient have one of the following disease processes/diagnoses(primary or secondary)? Sepsis   Week 2 attempt successful? No   Unsuccessful attempts Attempt 2  [All numbers listed were attempted-no answer]            Comfort H - Registered Nurse

## 2024-02-27 NOTE — PROGRESS NOTES
19-9 59  12/9/21 MRI abdomen ([facility]): Susceptibility artifact associated with the bile duct stent at the site of the choledochojejunostomy. Post Whipple surgical changes. There is mild intrahepatic bile duct distention with chronic distention of the pancreatic duct. No discrete obstructing mass localized. Moderate fecal stasis.   6/13/22 CT abd/pelvis ([facility]): There is thickening of the gastric wall involving the mid to distal body and antrum. The findings are worrisome for gastritis. Other etiologies are felt to be less likely. Prior Whipple procedure with pancreatic head resection. Mildly   dilated pancreatic duct is stable. There is a biliary stent with air in the stent and some air within the biliary tree. There are radiopaque densities in the liver likely related to prior embolic therapy. Wall thickening of the descending colon, sigmoid colon and rectum   suggesting colitis. No small bowel distention is seen. Prominent left gonadal vein and pelvic veins. The finding is stable. Small nonobstructive renal stone. Small fat-containing left inguinal hernia. Degenerative changes of the spine and hips left greater than right.   12/13/23 CT abd/pelvis ([facility]): Overall findings concerning for disease recurrence or progression.  Moderate intrahepatic and extrahepatic dilation, previously mild on 6/13/2022. Increased conspicuity and fullness of the operative bed with focal vague density in an area that was previously fat density.  A few new or enlarged mesenteric lymph nodes as detailed above. Haziness of the fat in the RIGHT paracolic gutter. New small fluid in the pelvis. Prominent inferior heart size. Calcified atherosclerosis. Nonobstructing RIGHT upper pole calculus. No hydronephrosis or ureteral calculus.   12/20/23 Tumor Markers: CA 19-9 166 CEA 9.9.   12/20/23 EGD with EUS by Dr John Mueller/[facility]: The celiac axis and associated vascular structures was identified and examined. No concerning or malignant lymphadenopathy

## 2024-02-28 ENCOUNTER — TRANSCRIBE ORDERS (OUTPATIENT)
Dept: ADMINISTRATIVE | Facility: HOSPITAL | Age: 79
End: 2024-02-28
Payer: MEDICARE

## 2024-02-28 ENCOUNTER — OFFICE VISIT (OUTPATIENT)
Dept: HEMATOLOGY | Age: 79
End: 2024-02-28
Payer: MEDICARE

## 2024-02-28 ENCOUNTER — HOSPITAL ENCOUNTER (OUTPATIENT)
Dept: INFUSION THERAPY | Age: 79
Discharge: HOME | End: 2024-02-28
Payer: MEDICARE

## 2024-02-28 VITALS
WEIGHT: 119.9 LBS | TEMPERATURE: 97.9 F | OXYGEN SATURATION: 99 % | DIASTOLIC BLOOD PRESSURE: 72 MMHG | BODY MASS INDEX: 22.07 KG/M2 | HEIGHT: 62 IN | HEART RATE: 68 BPM | SYSTOLIC BLOOD PRESSURE: 130 MMHG

## 2024-02-28 DIAGNOSIS — E28.39 OTHER PRIMARY OVARIAN FAILURE: Primary | ICD-10-CM

## 2024-02-28 DIAGNOSIS — R97.0 ELEVATED CARCINOEMBRYONIC ANTIGEN (CEA): ICD-10-CM

## 2024-02-28 DIAGNOSIS — C22.1 CHOLANGIOCARCINOMA (HCC): Primary | ICD-10-CM

## 2024-02-28 DIAGNOSIS — Z71.89 CARE PLAN DISCUSSED WITH PATIENT: ICD-10-CM

## 2024-02-28 DIAGNOSIS — L29.9 PRURITUS: ICD-10-CM

## 2024-02-28 PROCEDURE — G8484 FLU IMMUNIZE NO ADMIN: HCPCS | Performed by: INTERNAL MEDICINE

## 2024-02-28 PROCEDURE — 1123F ACP DISCUSS/DSCN MKR DOCD: CPT | Performed by: INTERNAL MEDICINE

## 2024-02-28 PROCEDURE — G8400 PT W/DXA NO RESULTS DOC: HCPCS | Performed by: INTERNAL MEDICINE

## 2024-02-28 PROCEDURE — 99212 OFFICE O/P EST SF 10 MIN: CPT

## 2024-02-28 PROCEDURE — 1090F PRES/ABSN URINE INCON ASSESS: CPT | Performed by: INTERNAL MEDICINE

## 2024-02-28 PROCEDURE — G8420 CALC BMI NORM PARAMETERS: HCPCS | Performed by: INTERNAL MEDICINE

## 2024-02-28 PROCEDURE — G8427 DOCREV CUR MEDS BY ELIG CLIN: HCPCS | Performed by: INTERNAL MEDICINE

## 2024-02-28 PROCEDURE — 99213 OFFICE O/P EST LOW 20 MIN: CPT | Performed by: INTERNAL MEDICINE

## 2024-02-28 PROCEDURE — 1036F TOBACCO NON-USER: CPT | Performed by: INTERNAL MEDICINE

## 2024-02-28 RX ORDER — CHOLESTYRAMINE 4 G/9G
1 POWDER, FOR SUSPENSION ORAL 2 TIMES DAILY
Qty: 90 PACKET | Refills: 3 | Status: SHIPPED | OUTPATIENT
Start: 2024-02-28

## 2024-02-28 RX ORDER — FUROSEMIDE 20 MG/1
20 TABLET ORAL DAILY
COMMUNITY

## 2024-02-28 RX ORDER — FEXOFENADINE HCL 180 MG/1
180 TABLET ORAL PRN
COMMUNITY

## 2024-03-05 ENCOUNTER — HOSPITAL ENCOUNTER (OUTPATIENT)
Dept: BONE DENSITY | Facility: HOSPITAL | Age: 79
Discharge: HOME | End: 2024-03-05
Admitting: FAMILY MEDICINE
Payer: MEDICARE

## 2024-03-05 DIAGNOSIS — E28.39 OTHER PRIMARY OVARIAN FAILURE: ICD-10-CM

## 2024-03-05 PROCEDURE — 77080 DXA BONE DENSITY AXIAL: CPT

## 2024-03-08 ENCOUNTER — TRANSCRIBE ORDERS (OUTPATIENT)
Dept: ADMINISTRATIVE | Facility: HOSPITAL | Age: 79
End: 2024-03-08
Payer: MEDICARE

## 2024-03-08 DIAGNOSIS — I50.9 HEART FAILURE, UNSPECIFIED HF CHRONICITY, UNSPECIFIED HEART FAILURE TYPE: Primary | ICD-10-CM

## 2024-03-19 ENCOUNTER — TRANSCRIBE ORDERS (OUTPATIENT)
Dept: ADMINISTRATIVE | Facility: HOSPITAL | Age: 79
End: 2024-03-19
Payer: MEDICARE

## 2024-03-19 DIAGNOSIS — R18.8 HEMORRHAGIC ASCITES: Primary | ICD-10-CM

## 2024-03-25 ENCOUNTER — TRANSCRIBE ORDERS (OUTPATIENT)
Dept: ADMINISTRATIVE | Facility: HOSPITAL | Age: 79
End: 2024-03-25
Payer: MEDICARE

## 2024-03-25 DIAGNOSIS — I10 ESSENTIAL (PRIMARY) HYPERTENSION: Primary | ICD-10-CM

## 2024-03-25 DIAGNOSIS — I27.89 OTHER SPECIFIED PULMONARY HEART DISEASES: ICD-10-CM

## 2024-03-26 ENCOUNTER — HOSPITAL ENCOUNTER (OUTPATIENT)
Dept: ULTRASOUND IMAGING | Facility: HOSPITAL | Age: 79
Discharge: HOME | End: 2024-03-26
Admitting: RADIOLOGY
Payer: MEDICARE

## 2024-03-26 PROCEDURE — 76705 ECHO EXAM OF ABDOMEN: CPT

## 2024-04-12 ENCOUNTER — HOSPITAL ENCOUNTER (OUTPATIENT)
Dept: GENERAL RADIOLOGY | Facility: HOSPITAL | Age: 79
Discharge: HOME | End: 2024-04-12
Payer: MEDICARE

## 2024-04-12 ENCOUNTER — HOSPITAL ENCOUNTER (OUTPATIENT)
Dept: CARDIOLOGY | Facility: HOSPITAL | Age: 79
Discharge: HOME | End: 2024-04-12
Payer: MEDICARE

## 2024-04-12 ENCOUNTER — TRANSCRIBE ORDERS (OUTPATIENT)
Dept: ADMINISTRATIVE | Facility: HOSPITAL | Age: 79
End: 2024-04-12
Payer: MEDICARE

## 2024-04-12 VITALS
HEIGHT: 62 IN | SYSTOLIC BLOOD PRESSURE: 131 MMHG | WEIGHT: 110 LBS | DIASTOLIC BLOOD PRESSURE: 65 MMHG | BODY MASS INDEX: 20.24 KG/M2

## 2024-04-12 DIAGNOSIS — K59.00 CONSTIPATION, UNSPECIFIED CONSTIPATION TYPE: ICD-10-CM

## 2024-04-12 DIAGNOSIS — K59.00 CONSTIPATION, UNSPECIFIED CONSTIPATION TYPE: Primary | ICD-10-CM

## 2024-04-12 DIAGNOSIS — I10 ESSENTIAL (PRIMARY) HYPERTENSION: ICD-10-CM

## 2024-04-12 DIAGNOSIS — I27.89 OTHER SPECIFIED PULMONARY HEART DISEASES: ICD-10-CM

## 2024-04-12 PROCEDURE — 74018 RADEX ABDOMEN 1 VIEW: CPT

## 2024-04-12 PROCEDURE — 93306 TTE W/DOPPLER COMPLETE: CPT

## 2024-04-14 LAB
BH CV ECHO MEAS - AO MAX PG: 13 MMHG
BH CV ECHO MEAS - AO MEAN PG: 7 MMHG
BH CV ECHO MEAS - AO ROOT DIAM: 3 CM
BH CV ECHO MEAS - AO V2 MAX: 180 CM/SEC
BH CV ECHO MEAS - AO V2 VTI: 37.1 CM
BH CV ECHO MEAS - AVA(I,D): 2.17 CM2
BH CV ECHO MEAS - EDV(CUBED): 80.6 ML
BH CV ECHO MEAS - EDV(MOD-SP2): 55.5 ML
BH CV ECHO MEAS - EDV(MOD-SP4): 46.2 ML
BH CV ECHO MEAS - EF(MOD-BP): 72.9 %
BH CV ECHO MEAS - EF(MOD-SP2): 72.1 %
BH CV ECHO MEAS - EF(MOD-SP4): 76.2 %
BH CV ECHO MEAS - ESV(CUBED): 14.9 ML
BH CV ECHO MEAS - ESV(MOD-SP2): 15.5 ML
BH CV ECHO MEAS - ESV(MOD-SP4): 11 ML
BH CV ECHO MEAS - FS: 43.1 %
BH CV ECHO MEAS - IVS/LVPW: 0.89 CM
BH CV ECHO MEAS - IVSD: 0.88 CM
BH CV ECHO MEAS - LA DIMENSION: 3.2 CM
BH CV ECHO MEAS - LAT PEAK E' VEL: 7.5 CM/SEC
BH CV ECHO MEAS - LV DIASTOLIC VOL/BSA (35-75): 31.2 CM2
BH CV ECHO MEAS - LV MASS(C)D: 130.8 GRAMS
BH CV ECHO MEAS - LV MAX PG: 6.5 MMHG
BH CV ECHO MEAS - LV MEAN PG: 3 MMHG
BH CV ECHO MEAS - LV SYSTOLIC VOL/BSA (12-30): 7.4 CM2
BH CV ECHO MEAS - LV V1 MAX: 127 CM/SEC
BH CV ECHO MEAS - LV V1 VTI: 25.6 CM
BH CV ECHO MEAS - LVIDD: 4.3 CM
BH CV ECHO MEAS - LVIDS: 2.46 CM
BH CV ECHO MEAS - LVOT AREA: 3.1 CM2
BH CV ECHO MEAS - LVOT DIAM: 2 CM
BH CV ECHO MEAS - LVPWD: 0.99 CM
BH CV ECHO MEAS - MED PEAK E' VEL: 7.8 CM/SEC
BH CV ECHO MEAS - MV A MAX VEL: 106 CM/SEC
BH CV ECHO MEAS - MV DEC SLOPE: 553 CM/SEC2
BH CV ECHO MEAS - MV DEC TIME: 0.17 SEC
BH CV ECHO MEAS - MV E MAX VEL: 95.4 CM/SEC
BH CV ECHO MEAS - MV E/A: 0.9
BH CV ECHO MEAS - MV MAX PG: 7.3 MMHG
BH CV ECHO MEAS - MV MEAN PG: 3 MMHG
BH CV ECHO MEAS - MV V2 VTI: 33.6 CM
BH CV ECHO MEAS - MVA(VTI): 2.39 CM2
BH CV ECHO MEAS - PA V2 MAX: 101 CM/SEC
BH CV ECHO MEAS - PI END-D VEL: 77.6 CM/SEC
BH CV ECHO MEAS - PULM A REVS DUR: 0.11 SEC
BH CV ECHO MEAS - PULM A REVS VEL: 32.5 CM/SEC
BH CV ECHO MEAS - RAP SYSTOLE: 5 MMHG
BH CV ECHO MEAS - RV MAX PG: 3.3 MMHG
BH CV ECHO MEAS - RV V1 MAX: 90.2 CM/SEC
BH CV ECHO MEAS - RV V1 VTI: 21 CM
BH CV ECHO MEAS - RVDD: 3.1 CM
BH CV ECHO MEAS - RVSP: 31.2 MMHG
BH CV ECHO MEAS - SI(MOD-SP2): 27 ML/M2
BH CV ECHO MEAS - SI(MOD-SP4): 23.7 ML/M2
BH CV ECHO MEAS - SV(LVOT): 80.4 ML
BH CV ECHO MEAS - SV(MOD-SP2): 40 ML
BH CV ECHO MEAS - SV(MOD-SP4): 35.2 ML
BH CV ECHO MEAS - TAPSE (>1.6): 2.38 CM
BH CV ECHO MEAS - TR MAX PG: 26.2 MMHG
BH CV ECHO MEAS - TR MAX VEL: 256 CM/SEC
BH CV ECHO MEASUREMENTS AVERAGE E/E' RATIO: 12.47
BH CV XLRA - RV BASE: 2.7 CM
BH CV XLRA - RV LENGTH: 5.1 CM
BH CV XLRA - RV MID: 2.45 CM
BH CV XLRA - TDI S': 13.7 CM/SEC
LEFT ATRIUM VOLUME INDEX: 36.6 ML/M2
LEFT ATRIUM VOLUME: 60.9 ML

## 2024-04-17 ENCOUNTER — HOSPITAL ENCOUNTER (OUTPATIENT)
Dept: CT IMAGING | Age: 79
Discharge: HOME | End: 2024-04-17
Payer: MEDICARE

## 2024-04-17 DIAGNOSIS — C22.1 CHOLANGIOCARCINOMA (HCC): ICD-10-CM

## 2024-04-17 PROCEDURE — 6360000004 HC RX CONTRAST MEDICATION: Performed by: INTERNAL MEDICINE

## 2024-04-17 PROCEDURE — 74177 CT ABD & PELVIS W/CONTRAST: CPT

## 2024-04-17 RX ADMIN — IOPAMIDOL 75 ML: 755 INJECTION, SOLUTION INTRAVENOUS at 09:31

## 2024-05-02 DIAGNOSIS — C22.1 CHOLANGIOCARCINOMA (HCC): Primary | ICD-10-CM

## 2024-05-03 ENCOUNTER — TELEPHONE (OUTPATIENT)
Dept: INTERVENTIONAL RADIOLOGY/VASCULAR | Age: 79
End: 2024-05-03

## 2024-05-03 DIAGNOSIS — C22.1 CHOLANGIOCARCINOMA (HCC): ICD-10-CM

## 2024-05-03 DIAGNOSIS — C22.1 CHOLANGIOCARCINOMA (HCC): Primary | ICD-10-CM

## 2024-05-03 DIAGNOSIS — Z71.89 CARE PLAN DISCUSSED WITH PATIENT: Primary | ICD-10-CM

## 2024-05-03 NOTE — TELEPHONE ENCOUNTER
Per Dr Vargas request, I had this patient scheduled for Monday May 6 at noon for a paracentesis.  I called to let Mrs An know and she stated that she already attempted a paracentesis at [facility] and was told it can not be completed safely.  I left her on the schedule until Dr Vargas can review the case.  Patient needs to have a platelet gage prior to apt time if she keeps the apt.

## 2024-05-06 ENCOUNTER — HOSPITAL ENCOUNTER (OUTPATIENT)
Dept: INFUSION THERAPY | Age: 79
Discharge: HOME | End: 2024-05-06

## 2024-05-06 ENCOUNTER — HOSPITAL ENCOUNTER (OUTPATIENT)
Dept: ULTRASOUND IMAGING | Age: 79
Discharge: HOME | End: 2024-05-06
Attending: INTERNAL MEDICINE
Payer: MEDICARE

## 2024-05-06 ENCOUNTER — CLINICAL DOCUMENTATION (OUTPATIENT)
Dept: HEMATOLOGY | Age: 79
End: 2024-05-06

## 2024-05-06 DIAGNOSIS — C22.1 CHOLANGIOCARCINOMA (HCC): ICD-10-CM

## 2024-05-06 DIAGNOSIS — Z71.89 CARE PLAN DISCUSSED WITH PATIENT: ICD-10-CM

## 2024-05-06 DIAGNOSIS — R97.0 ELEVATED CARCINOEMBRYONIC ANTIGEN (CEA): ICD-10-CM

## 2024-05-06 PROCEDURE — 10160 PNXR ASPIR ABSC HMTMA BULLA: CPT

## 2024-05-06 NOTE — PROGRESS NOTES
Patient has come into clinic to have CBC, CEA and  to be completed. Patient has been fasting to have Paracentesis this morning and has not been successful to have bloodwork done. I have spoken with Altagracia Shay RN with [facility] to request to have bloodwork completed. If not successful to have labs done she will reschedule to have completed.

## 2024-05-13 ENCOUNTER — TELEPHONE (OUTPATIENT)
Dept: HEMATOLOGY | Age: 79
End: 2024-05-13

## 2024-05-13 DIAGNOSIS — C22.1 CHOLANGIOCARCINOMA (HCC): Primary | ICD-10-CM

## 2024-05-13 DIAGNOSIS — R97.0 ELEVATED CARCINOEMBRYONIC ANTIGEN (CEA): ICD-10-CM

## 2024-05-13 NOTE — TELEPHONE ENCOUNTER
Called Patient and reminded patient of their appointment on 05/15/2024 and patient confirmed they would be here

## 2024-05-13 NOTE — PROGRESS NOTES
without evidence of a discrete transition point. This may be related to stasis from the increased volume of fecal material within the colon. There is diverticulosis of the sigmoid colon without diverticulitis. Nonobstructing right-sided nephrolithiasis. No ureteral stone or obstructive uropathy. Fat-containing periumbilical hernia.  Fat-containing left inguinal hernia. The uterus and adnexa are unremarkable..   2/21/24 Tumor Markers: CEA 6.4, CA 19-9-552  4/15/24 Bone Density (BHP) Osteopenia. Lumbar Spine T-Score -1.1, Left Hip T-Score -1.2  4/22/24 CT Abd/Pelvis W IV Contrast (oral) Soft tissue prominence of the sarika hepatis stable from prior.  Prior metallic biliary stent, cholecystectomy, Whipple procedure.  Moderate ascites new from prior.  Stable intrahepatic biliary dilation.  No developing lymphadenopathy.  Nonobstructive bowel pattern.  Solitary nonobstructing nephrolithiasis right kidney, stable.  5/6/24 US Guided Paracentesis:Peritoneal fluid, ThinPrep: Very rare atypical epithelioid cells present, suspicious for but not diagnostic of metastatic adenocarcinoma.    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

## 2024-05-15 ENCOUNTER — HOSPITAL ENCOUNTER (OUTPATIENT)
Dept: INFUSION THERAPY | Age: 79
Discharge: HOME | End: 2024-05-15
Payer: MEDICARE

## 2024-05-15 ENCOUNTER — OFFICE VISIT (OUTPATIENT)
Dept: HEMATOLOGY | Age: 79
End: 2024-05-15
Payer: MEDICARE

## 2024-05-15 VITALS
BODY MASS INDEX: 22.08 KG/M2 | OXYGEN SATURATION: 99 % | SYSTOLIC BLOOD PRESSURE: 110 MMHG | HEART RATE: 78 BPM | HEIGHT: 62 IN | TEMPERATURE: 98.9 F | DIASTOLIC BLOOD PRESSURE: 60 MMHG | WEIGHT: 120 LBS

## 2024-05-15 DIAGNOSIS — E87.1 HYPONATREMIA: ICD-10-CM

## 2024-05-15 DIAGNOSIS — C22.1 CHOLANGIOCARCINOMA (HCC): Primary | ICD-10-CM

## 2024-05-15 DIAGNOSIS — E80.6 HYPERBILIRUBINEMIA: ICD-10-CM

## 2024-05-15 DIAGNOSIS — D72.9 NEUTROPHILIC LEUKOCYTOSIS: ICD-10-CM

## 2024-05-15 DIAGNOSIS — Z71.89 CARE PLAN DISCUSSED WITH PATIENT: ICD-10-CM

## 2024-05-15 DIAGNOSIS — R97.0 ELEVATED CARCINOEMBRYONIC ANTIGEN (CEA): ICD-10-CM

## 2024-05-15 DIAGNOSIS — Z71.89 COORDINATION OF COMPLEX CARE: ICD-10-CM

## 2024-05-15 DIAGNOSIS — C22.1 CHOLANGIOCARCINOMA (HCC): ICD-10-CM

## 2024-05-15 LAB
ALBUMIN SERPL-MCNC: 3.2 G/DL (ref 3.5–5.2)
ALP SERPL-CCNC: 367 U/L (ref 35–104)
ALT SERPL-CCNC: 25 U/L (ref 9–52)
ANION GAP SERPL CALCULATED.3IONS-SCNC: 8 MMOL/L (ref 7–19)
AST SERPL-CCNC: 52 U/L (ref 14–36)
BASOPHILS # BLD: 0.03 K/UL (ref 0.01–0.08)
BASOPHILS NFR BLD: 0.2 % (ref 0.1–1.2)
BILIRUB SERPL-MCNC: 2.1 MG/DL (ref 0.2–1.3)
BUN SERPL-MCNC: 8 MG/DL (ref 7–17)
CALCIUM SERPL-MCNC: 8.1 MG/DL (ref 8.4–10.2)
CANCER AG19-9 SERPL-ACNC: 142 U/ML (ref 0–35)
CEA SERPL-MCNC: 8.9 NG/ML (ref 0–4.7)
CHLORIDE SERPL-SCNC: 98 MMOL/L (ref 98–111)
CO2 SERPL-SCNC: 26 MMOL/L (ref 22–29)
CREAT SERPL-MCNC: 0.5 MG/DL (ref 0.5–1)
EOSINOPHIL # BLD: 0.02 K/UL (ref 0.04–0.54)
EOSINOPHIL NFR BLD: 0.1 % (ref 0.7–7)
ERYTHROCYTE [DISTWIDTH] IN BLOOD BY AUTOMATED COUNT: 14 % (ref 11.7–14.4)
GLOBULIN: 3.1 G/DL
GLUCOSE SERPL-MCNC: 131 MG/DL (ref 74–106)
HCT VFR BLD AUTO: 35.4 % (ref 34.1–44.9)
HGB BLD-MCNC: 11.9 G/DL (ref 11.2–15.7)
LYMPHOCYTES # BLD: 1 K/UL (ref 1.18–3.74)
LYMPHOCYTES NFR BLD: 6.8 % (ref 19.3–53.1)
MCH RBC QN AUTO: 32.2 PG (ref 25.6–32.2)
MCHC RBC AUTO-ENTMCNC: 33.6 G/DL (ref 32.3–35.5)
MCV RBC AUTO: 95.7 FL (ref 79.4–94.8)
MONOCYTES # BLD: 1.77 K/UL (ref 0.24–0.82)
MONOCYTES NFR BLD: 12 % (ref 4.7–12.5)
NEUTROPHILS # BLD: 11.76 K/UL (ref 1.56–6.13)
NEUTS SEG NFR BLD: 79.9 % (ref 34–71.1)
PLATELET # BLD AUTO: 233 K/UL (ref 182–369)
PMV BLD AUTO: 9.7 FL (ref 7.4–10.4)
POTASSIUM SERPL-SCNC: 3.9 MMOL/L (ref 3.5–5.1)
PROT SERPL-MCNC: 6.2 G/DL (ref 6.3–8.2)
RBC # BLD AUTO: 3.7 M/UL (ref 3.93–5.22)
SODIUM SERPL-SCNC: 132 MMOL/L (ref 137–145)
WBC # BLD AUTO: 14.72 K/UL (ref 3.98–10.04)

## 2024-05-15 PROCEDURE — G2211 COMPLEX E/M VISIT ADD ON: HCPCS | Performed by: INTERNAL MEDICINE

## 2024-05-15 PROCEDURE — 99212 OFFICE O/P EST SF 10 MIN: CPT

## 2024-05-15 PROCEDURE — 1090F PRES/ABSN URINE INCON ASSESS: CPT | Performed by: INTERNAL MEDICINE

## 2024-05-15 PROCEDURE — G8427 DOCREV CUR MEDS BY ELIG CLIN: HCPCS | Performed by: INTERNAL MEDICINE

## 2024-05-15 PROCEDURE — 1036F TOBACCO NON-USER: CPT | Performed by: INTERNAL MEDICINE

## 2024-05-15 PROCEDURE — 99214 OFFICE O/P EST MOD 30 MIN: CPT | Performed by: INTERNAL MEDICINE

## 2024-05-15 PROCEDURE — 80053 COMPREHEN METABOLIC PANEL: CPT

## 2024-05-15 PROCEDURE — G8399 PT W/DXA RESULTS DOCUMENT: HCPCS | Performed by: INTERNAL MEDICINE

## 2024-05-15 PROCEDURE — G8420 CALC BMI NORM PARAMETERS: HCPCS | Performed by: INTERNAL MEDICINE

## 2024-05-15 PROCEDURE — 36415 COLL VENOUS BLD VENIPUNCTURE: CPT

## 2024-05-15 PROCEDURE — 85025 COMPLETE CBC W/AUTO DIFF WBC: CPT

## 2024-05-15 PROCEDURE — 1123F ACP DISCUSS/DSCN MKR DOCD: CPT | Performed by: INTERNAL MEDICINE

## 2024-05-15 RX ORDER — SPIRONOLACTONE 25 MG/1
1 TABLET ORAL DAILY
COMMUNITY
Start: 2024-03-06

## 2024-05-23 ENCOUNTER — HOSPITAL ENCOUNTER (OUTPATIENT)
Dept: ULTRASOUND IMAGING | Age: 79
Discharge: HOME | End: 2024-05-23
Attending: INTERNAL MEDICINE
Payer: MEDICARE

## 2024-05-23 DIAGNOSIS — C22.1 CHOLANGIOCARCINOMA (HCC): ICD-10-CM

## 2024-05-23 PROCEDURE — 49083 ABD PARACENTESIS W/IMAGING: CPT

## 2024-05-23 PROCEDURE — 10160 PNXR ASPIR ABSC HMTMA BULLA: CPT

## 2024-05-31 ENCOUNTER — TELEPHONE (OUTPATIENT)
Dept: HEMATOLOGY | Age: 79
End: 2024-05-31

## 2024-05-31 NOTE — TELEPHONE ENCOUNTER
Called Patient and reminded patient of their appointment on 06/5/2024 and patient confirmed they would be here.

## 2024-06-03 NOTE — PROGRESS NOTES
complex care  Comments:  Called and discussed care plan with Dr. Arnold, patient's primary PCP.    Hyponatremia    Abnormal LFTs    Other ascites      History of cholangiocarcinoma, 2011  Status post Whipple procedure followed by radiation and chemotherapy with Xeloda  December 2023-CT scan abdomen at [facility] with concern findings for recurrence  12/20/2023-EGD/EUS with FNA biopsy-peritoneal fluid removed.  Cytology negative.    At this point, I do not have cytologic or biopsy evidence of recurrence. However, the elevation of her CA 19-9 and CEA are quite concerning for recurrent disease.  1/12/24 PET CT Skull Base to Mid Thigh: The patient has a history of cholangiocarcinoma, post Whipple procedure, biliary stent and hepatic embolization. There is an area of slight  increased metabolic activity in the subcapsular right hepatic lobe, with an SUV max of 3.0, with the remaining hepatic parenchyma having an average SUV max of 2.7.  This is a very nonspecific finding and does not necessarily indicate recurrent or residual disease. Outside of this finding, no additional sites are suggested for recurrent or residual disease. 0.7 cm left lower lobe nodule without significant metabolic activity. Non obstructing right nephrolithiasis.    My independent interpretation-very mild uptake in the right lobe of the liver.  No other evidence of abnormal uptake in the chest abdomen or pelvis.  No evidence of ascites.    Essentially, no clear-cut evidence of clinical, radiologic evidence of disease recurrence.  The patient has been seen by surgical oncology, Dr. Ellis Guthrie.  There is no indication for surgical intervention.  Discussed with Dr. Ellis Guthrie.    I have recommended to hold any palliative treatment at this time.  I have recommended close clinical, serologic and radiologic surveillance.  The patient is in agreement.  I have asked the patient to look for signs such as weight loss, increased abdominal pain, jaundice

## 2024-06-05 ENCOUNTER — OFFICE VISIT (OUTPATIENT)
Dept: HEMATOLOGY | Age: 79
End: 2024-06-05
Payer: MEDICARE

## 2024-06-05 ENCOUNTER — HOSPITAL ENCOUNTER (OUTPATIENT)
Dept: INFUSION THERAPY | Age: 79
Discharge: HOME | End: 2024-06-05
Payer: MEDICARE

## 2024-06-05 VITALS
HEIGHT: 62 IN | BODY MASS INDEX: 20.61 KG/M2 | TEMPERATURE: 97.9 F | DIASTOLIC BLOOD PRESSURE: 70 MMHG | WEIGHT: 112 LBS | HEART RATE: 70 BPM | OXYGEN SATURATION: 98 % | SYSTOLIC BLOOD PRESSURE: 118 MMHG

## 2024-06-05 DIAGNOSIS — Z71.89 CARE PLAN DISCUSSED WITH PATIENT: ICD-10-CM

## 2024-06-05 DIAGNOSIS — C22.1 CHOLANGIOCARCINOMA (HCC): ICD-10-CM

## 2024-06-05 DIAGNOSIS — R97.0 ELEVATED CARCINOEMBRYONIC ANTIGEN (CEA): ICD-10-CM

## 2024-06-05 DIAGNOSIS — C22.1 CHOLANGIOCARCINOMA (HCC): Primary | ICD-10-CM

## 2024-06-05 DIAGNOSIS — R79.89 ABNORMAL LFTS: ICD-10-CM

## 2024-06-05 DIAGNOSIS — R18.8 OTHER ASCITES: ICD-10-CM

## 2024-06-05 DIAGNOSIS — E87.1 HYPONATREMIA: ICD-10-CM

## 2024-06-05 DIAGNOSIS — R18.8 ABDOMINAL FLUID COLLECTION: ICD-10-CM

## 2024-06-05 DIAGNOSIS — K21.9 GASTROESOPHAGEAL REFLUX DISEASE WITHOUT ESOPHAGITIS: ICD-10-CM

## 2024-06-05 DIAGNOSIS — Z71.89 COORDINATION OF COMPLEX CARE: ICD-10-CM

## 2024-06-05 LAB
ALBUMIN SERPL-MCNC: 3.5 G/DL (ref 3.5–5.2)
ALP SERPL-CCNC: 433 U/L (ref 35–104)
ALT SERPL-CCNC: 29 U/L (ref 9–52)
ANION GAP SERPL CALCULATED.3IONS-SCNC: 5 MMOL/L (ref 7–19)
AST SERPL-CCNC: 60 U/L (ref 14–36)
BASOPHILS # BLD: 0.05 K/UL (ref 0.01–0.08)
BASOPHILS NFR BLD: 0.6 % (ref 0.1–1.2)
BILIRUB SERPL-MCNC: 1.8 MG/DL (ref 0.2–1.3)
BUN SERPL-MCNC: 13 MG/DL (ref 7–17)
CALCIUM SERPL-MCNC: 8.7 MG/DL (ref 8.4–10.2)
CEA SERPL-MCNC: 11 NG/ML (ref 0–4.7)
CHLORIDE SERPL-SCNC: 94 MMOL/L (ref 98–111)
CO2 SERPL-SCNC: 34 MMOL/L (ref 22–29)
CREAT SERPL-MCNC: 0.8 MG/DL (ref 0.5–1)
EOSINOPHIL # BLD: 0.04 K/UL (ref 0.04–0.54)
EOSINOPHIL NFR BLD: 0.4 % (ref 0.7–7)
ERYTHROCYTE [DISTWIDTH] IN BLOOD BY AUTOMATED COUNT: 16.4 % (ref 11.7–14.4)
GLOBULIN: 3.4 G/DL
GLUCOSE SERPL-MCNC: 104 MG/DL (ref 74–106)
HCT VFR BLD AUTO: 35.4 % (ref 34.1–44.9)
HGB BLD-MCNC: 12.1 G/DL (ref 11.2–15.7)
LYMPHOCYTES # BLD: 1.31 K/UL (ref 1.18–3.74)
LYMPHOCYTES NFR BLD: 14.5 % (ref 19.3–53.1)
MCH RBC QN AUTO: 32.6 PG (ref 25.6–32.2)
MCHC RBC AUTO-ENTMCNC: 34.2 G/DL (ref 32.3–35.5)
MCV RBC AUTO: 95.4 FL (ref 79.4–94.8)
MONOCYTES # BLD: 0.98 K/UL (ref 0.24–0.82)
MONOCYTES NFR BLD: 10.9 % (ref 4.7–12.5)
NEUTROPHILS # BLD: 6.59 K/UL (ref 1.56–6.13)
NEUTS SEG NFR BLD: 72.9 % (ref 34–71.1)
PLATELET # BLD AUTO: 262 K/UL (ref 182–369)
PMV BLD AUTO: 10.5 FL (ref 7.4–10.4)
POTASSIUM SERPL-SCNC: 4.1 MMOL/L (ref 3.5–5.1)
PROT SERPL-MCNC: 6.9 G/DL (ref 6.3–8.2)
RBC # BLD AUTO: 3.71 M/UL (ref 3.93–5.22)
SODIUM SERPL-SCNC: 133 MMOL/L (ref 137–145)
WBC # BLD AUTO: 9.03 K/UL (ref 3.98–10.04)

## 2024-06-05 PROCEDURE — 1123F ACP DISCUSS/DSCN MKR DOCD: CPT | Performed by: INTERNAL MEDICINE

## 2024-06-05 PROCEDURE — 1036F TOBACCO NON-USER: CPT | Performed by: INTERNAL MEDICINE

## 2024-06-05 PROCEDURE — 80053 COMPREHEN METABOLIC PANEL: CPT

## 2024-06-05 PROCEDURE — G8420 CALC BMI NORM PARAMETERS: HCPCS | Performed by: INTERNAL MEDICINE

## 2024-06-05 PROCEDURE — G8399 PT W/DXA RESULTS DOCUMENT: HCPCS | Performed by: INTERNAL MEDICINE

## 2024-06-05 PROCEDURE — 85025 COMPLETE CBC W/AUTO DIFF WBC: CPT

## 2024-06-05 PROCEDURE — 1090F PRES/ABSN URINE INCON ASSESS: CPT | Performed by: INTERNAL MEDICINE

## 2024-06-05 PROCEDURE — G8427 DOCREV CUR MEDS BY ELIG CLIN: HCPCS | Performed by: INTERNAL MEDICINE

## 2024-06-05 PROCEDURE — 99214 OFFICE O/P EST MOD 30 MIN: CPT | Performed by: INTERNAL MEDICINE

## 2024-06-05 PROCEDURE — G2211 COMPLEX E/M VISIT ADD ON: HCPCS | Performed by: INTERNAL MEDICINE

## 2024-06-05 PROCEDURE — 36415 COLL VENOUS BLD VENIPUNCTURE: CPT

## 2024-06-05 PROCEDURE — 99212 OFFICE O/P EST SF 10 MIN: CPT

## 2024-07-15 ENCOUNTER — TELEPHONE (OUTPATIENT)
Dept: HEMATOLOGY | Age: 79
End: 2024-07-15

## 2024-07-15 NOTE — TELEPHONE ENCOUNTER
Called and left detailed vm about appt. date and time on 07/17/24. I left message to come at appt. time & not any earlier & we would gladly take care of everything at that time of their follow up appt. time. Left number to call in case they can't make this appt. and needed to r/s.

## 2024-07-17 ENCOUNTER — APPOINTMENT (OUTPATIENT)
Dept: INFUSION THERAPY | Age: 79
End: 2024-07-17
Payer: MEDICARE

## 2024-07-29 ENCOUNTER — HOSPITAL ENCOUNTER (OUTPATIENT)
Dept: CT IMAGING | Age: 79
Discharge: HOME | End: 2024-07-29
Attending: INTERNAL MEDICINE
Payer: MEDICARE

## 2024-07-29 DIAGNOSIS — R18.8 ABDOMINAL FLUID COLLECTION: ICD-10-CM

## 2024-07-29 DIAGNOSIS — C22.1 CHOLANGIOCARCINOMA (HCC): ICD-10-CM

## 2024-07-29 PROCEDURE — 74177 CT ABD & PELVIS W/CONTRAST: CPT

## 2024-07-29 PROCEDURE — 6360000004 HC RX CONTRAST MEDICATION: Performed by: INTERNAL MEDICINE

## 2024-07-29 RX ADMIN — IOPAMIDOL 75 ML: 755 INJECTION, SOLUTION INTRAVENOUS at 15:14

## 2024-08-02 ENCOUNTER — TELEPHONE (OUTPATIENT)
Dept: HEMATOLOGY | Age: 79
End: 2024-08-02

## 2024-08-02 NOTE — TELEPHONE ENCOUNTER
I called and reminded pt. of their appt. on 08/06/2024. Made pt. aware to arrive at appt. time & not lab appt. time. I also advised pt. to arrive no sooner than appt time and the address of our NEW BUILDING. Pt voiced understanding and confirmed appt. date and time.

## 2024-08-05 DIAGNOSIS — C22.1 CHOLANGIOCARCINOMA (HCC): Primary | ICD-10-CM

## 2024-08-05 DIAGNOSIS — R97.0 ELEVATED CARCINOEMBRYONIC ANTIGEN (CEA): ICD-10-CM

## 2024-08-05 NOTE — PROGRESS NOTES
weeks (around 10/1/2024) for CBC, CMP, CEA, CA 19-9, Appointment with Dr. Vargas.  Referral with Luzma Gutierrez I, Rula Rosales am pre charting  as Medical Assistant for Isabel Vargas MD. Electronically signed by Rula Rosales MA on 8/6/2024 at 12:26 PM CDT.  I have seen, examined and reviewed this patient medication list, appropriate labs and imaging studies. I reviewed relevant medical records and others physician’s notes. I discussed the plans of care with the patient. I answered all the questions to the patient’s satisfaction. I have also reviewed the chief complaint (CC) and part of the history (History of Present Illness (HPI), Past Family Social History (PFSH), or Review of Systems (ROS) and made changes when appropriated.       (Please note that portions of this note were completed with a voice recognition program. Efforts were made to edit the dictations but occasionally words are mis-transcribed.)Electronically signed by Isabel Vargas MD on 8/6/2024 at 11:32 AM

## 2024-08-06 ENCOUNTER — OFFICE VISIT (OUTPATIENT)
Dept: HEMATOLOGY | Age: 79
End: 2024-08-06
Payer: MEDICARE

## 2024-08-06 ENCOUNTER — HOSPITAL ENCOUNTER (OUTPATIENT)
Dept: INFUSION THERAPY | Age: 79
Discharge: HOME | End: 2024-08-06
Payer: MEDICARE

## 2024-08-06 VITALS
OXYGEN SATURATION: 99 % | TEMPERATURE: 97.9 F | WEIGHT: 107.9 LBS | HEIGHT: 62 IN | DIASTOLIC BLOOD PRESSURE: 62 MMHG | HEART RATE: 77 BPM | BODY MASS INDEX: 19.85 KG/M2 | SYSTOLIC BLOOD PRESSURE: 118 MMHG

## 2024-08-06 DIAGNOSIS — Z71.89 CARE PLAN DISCUSSED WITH PATIENT: ICD-10-CM

## 2024-08-06 DIAGNOSIS — R18.8 ABDOMINAL FLUID COLLECTION: ICD-10-CM

## 2024-08-06 DIAGNOSIS — R63.4 WEIGHT LOSS: ICD-10-CM

## 2024-08-06 DIAGNOSIS — E87.1 HYPONATREMIA: ICD-10-CM

## 2024-08-06 DIAGNOSIS — R53.0 NEOPLASTIC MALIGNANT RELATED FATIGUE: ICD-10-CM

## 2024-08-06 DIAGNOSIS — R97.0 ELEVATED CARCINOEMBRYONIC ANTIGEN (CEA): ICD-10-CM

## 2024-08-06 DIAGNOSIS — R53.81 PHYSICAL DECONDITIONING: ICD-10-CM

## 2024-08-06 DIAGNOSIS — C22.1 CHOLANGIOCARCINOMA (HCC): Primary | ICD-10-CM

## 2024-08-06 DIAGNOSIS — E53.8 B12 DEFICIENCY: ICD-10-CM

## 2024-08-06 DIAGNOSIS — C22.1 CHOLANGIOCARCINOMA (HCC): ICD-10-CM

## 2024-08-06 LAB
ALBUMIN SERPL-MCNC: 3.1 G/DL (ref 3.5–5.2)
ALP SERPL-CCNC: 342 U/L (ref 35–104)
ALT SERPL-CCNC: 19 U/L (ref 5–33)
ANION GAP SERPL CALCULATED.3IONS-SCNC: 11 MMOL/L (ref 7–19)
AST SERPL-CCNC: 45 U/L (ref 5–32)
BASOPHILS # BLD: 0.04 K/UL (ref 0.01–0.08)
BASOPHILS NFR BLD: 0.4 % (ref 0.1–1.2)
BILIRUB SERPL-MCNC: 1.1 MG/DL (ref 0–1.2)
BUN SERPL-MCNC: 8 MG/DL (ref 8–23)
CALCIUM SERPL-MCNC: 8.1 MG/DL (ref 8.8–10.2)
CANCER AG19-9 SERPL-ACNC: 233 U/ML (ref 0–35)
CEA SERPL-MCNC: 10 NG/ML (ref 0–4.7)
CHLORIDE SERPL-SCNC: 97 MMOL/L (ref 98–107)
CO2 SERPL-SCNC: 23 MMOL/L (ref 22–29)
CREAT SERPL-MCNC: 0.7 MG/DL (ref 0.5–0.9)
EOSINOPHIL # BLD: 0.04 K/UL (ref 0.04–0.54)
EOSINOPHIL NFR BLD: 0.4 % (ref 0.7–7)
ERYTHROCYTE [DISTWIDTH] IN BLOOD BY AUTOMATED COUNT: 14.6 % (ref 11.7–14.4)
GLUCOSE SERPL-MCNC: 177 MG/DL (ref 70–99)
HCT VFR BLD AUTO: 34.3 % (ref 34.1–44.9)
HGB BLD-MCNC: 11.6 G/DL (ref 11.2–15.7)
LYMPHOCYTES # BLD: 0.79 K/UL (ref 1.18–3.74)
LYMPHOCYTES NFR BLD: 8.4 % (ref 19.3–53.1)
MCH RBC QN AUTO: 32.4 PG (ref 25.6–32.2)
MCHC RBC AUTO-ENTMCNC: 33.8 G/DL (ref 32.3–35.5)
MCV RBC AUTO: 95.8 FL (ref 79.4–94.8)
MONOCYTES # BLD: 0.96 K/UL (ref 0.24–0.82)
MONOCYTES NFR BLD: 10.3 % (ref 4.7–12.5)
NEUTROPHILS # BLD: 7.38 K/UL (ref 1.56–6.13)
NEUTS SEG NFR BLD: 79 % (ref 34–71.1)
PLATELET # BLD AUTO: 203 K/UL (ref 182–369)
PMV BLD AUTO: 10.1 FL (ref 7.4–10.4)
POTASSIUM SERPL-SCNC: 4.5 MMOL/L (ref 3.5–5.1)
PROT SERPL-MCNC: 6.7 G/DL (ref 6.4–8.3)
RBC # BLD AUTO: 3.58 M/UL (ref 3.93–5.22)
SODIUM SERPL-SCNC: 131 MMOL/L (ref 136–145)
WBC # BLD AUTO: 9.35 K/UL (ref 3.98–10.04)

## 2024-08-06 PROCEDURE — 99213 OFFICE O/P EST LOW 20 MIN: CPT

## 2024-08-06 PROCEDURE — G8427 DOCREV CUR MEDS BY ELIG CLIN: HCPCS | Performed by: INTERNAL MEDICINE

## 2024-08-06 PROCEDURE — 80053 COMPREHEN METABOLIC PANEL: CPT

## 2024-08-06 PROCEDURE — 1090F PRES/ABSN URINE INCON ASSESS: CPT | Performed by: INTERNAL MEDICINE

## 2024-08-06 PROCEDURE — G8399 PT W/DXA RESULTS DOCUMENT: HCPCS | Performed by: INTERNAL MEDICINE

## 2024-08-06 PROCEDURE — G8420 CALC BMI NORM PARAMETERS: HCPCS | Performed by: INTERNAL MEDICINE

## 2024-08-06 PROCEDURE — 85025 COMPLETE CBC W/AUTO DIFF WBC: CPT

## 2024-08-06 PROCEDURE — 36415 COLL VENOUS BLD VENIPUNCTURE: CPT

## 2024-08-06 PROCEDURE — 99213 OFFICE O/P EST LOW 20 MIN: CPT | Performed by: INTERNAL MEDICINE

## 2024-08-06 PROCEDURE — 1036F TOBACCO NON-USER: CPT | Performed by: INTERNAL MEDICINE

## 2024-08-06 PROCEDURE — 1123F ACP DISCUSS/DSCN MKR DOCD: CPT | Performed by: INTERNAL MEDICINE

## 2024-08-06 RX ORDER — PANTOPRAZOLE SODIUM 40 MG/1
1 TABLET, DELAYED RELEASE ORAL DAILY
COMMUNITY

## 2024-09-26 ENCOUNTER — TELEPHONE (OUTPATIENT)
Dept: HEMATOLOGY | Age: 79
End: 2024-09-26

## 2024-09-30 DIAGNOSIS — C22.1 CHOLANGIOCARCINOMA (HCC): Primary | ICD-10-CM

## 2024-09-30 DIAGNOSIS — R97.0 ELEVATED CEA: ICD-10-CM

## 2024-09-30 NOTE — PROGRESS NOTES
MEDICAL ONCOLOGY PROGRESS NOTE                                                          Natacha An   1945  10/1/2024     Chief Complaint   Patient presents with    Follow-up     Cholangiocarcinoma (HCC)        HISTORY OF PRESENT ILLNESS:  Reason for MD visit-toxicity assessment/disease management  The patient is a very pleasant 78 years old female who has a diagnosis of cholangiocarcinoma.  She was initially diagnosed in 2011.  She underwent Whipple procedure in [facility].  The patient is now presenting with recurrent ascites.  She had paracentesis x 3.  First paracentesis cytology showed benign findings.  Second paracentesis cytology showed suspicious cells concerning for adenocarcinoma.  The third and most recent paracentesis showed reactive cells with acute/chronic inflammation.  Her CA 19-9 continues to fluctuate in the 200s range.  She was started on furosemide and spironolactone for ascites with significant improvement.  She continues to complain of fatigue.  She has a complaints of persistent steatorrhea.  She has a good appetite.  She has lost some weight since last visit.  She had a 10 pound weight loss since June 2024.       Diagnosis  Cholangiocarcinoma, 2011      Treatment Summary  2011 Whipple by Dr Timi Ying with surgical oncology at [facility]   2011 Radiation therapy with Xeloda/Capecitabine.     Cancer History  Natacha An was referred by Dr John Mueller with [facility] with possible diagnosis of recurrent cholangiocarcinoma status post Whipple procedure by Dr Timi Ying with surgical oncology at [facility] followed by radiation therapy and Xeloda/Capecitabine. She did not have any neoadjuvant chemotherapy.   2/12/14 CT chest ([facility]): No acute abnormality is seen within the chest.   2/12/14 CT abd/pelvis ([facility]): Common bile duct stents are present. Intrahepatic bile duct dilation is more prominent now than what was present on the exam from 2011. Prominent

## 2024-10-01 ENCOUNTER — HOSPITAL ENCOUNTER (OUTPATIENT)
Dept: INFUSION THERAPY | Age: 79
Discharge: HOME | End: 2024-10-01
Payer: MEDICARE

## 2024-10-01 ENCOUNTER — OFFICE VISIT (OUTPATIENT)
Dept: HEMATOLOGY | Age: 79
End: 2024-10-01
Payer: MEDICARE

## 2024-10-01 VITALS
OXYGEN SATURATION: 99 % | TEMPERATURE: 96.8 F | SYSTOLIC BLOOD PRESSURE: 122 MMHG | HEART RATE: 77 BPM | BODY MASS INDEX: 18.92 KG/M2 | DIASTOLIC BLOOD PRESSURE: 78 MMHG | HEIGHT: 62 IN | WEIGHT: 102.8 LBS

## 2024-10-01 VITALS — BODY MASS INDEX: 18.8 KG/M2 | HEIGHT: 62 IN

## 2024-10-01 DIAGNOSIS — K86.89 PANCREATIC INSUFFICIENCY: ICD-10-CM

## 2024-10-01 DIAGNOSIS — E87.1 HYPONATREMIA: ICD-10-CM

## 2024-10-01 DIAGNOSIS — R63.4 WEIGHT LOSS: Primary | ICD-10-CM

## 2024-10-01 DIAGNOSIS — R19.7 DIARRHEA, UNSPECIFIED TYPE: ICD-10-CM

## 2024-10-01 DIAGNOSIS — R23.3 EASY BRUISING: ICD-10-CM

## 2024-10-01 DIAGNOSIS — R53.83 OTHER FATIGUE: ICD-10-CM

## 2024-10-01 DIAGNOSIS — Z85.09 HISTORY OF CHOLANGIOCARCINOMA: ICD-10-CM

## 2024-10-01 DIAGNOSIS — R53.0 NEOPLASTIC MALIGNANT RELATED FATIGUE: Primary | ICD-10-CM

## 2024-10-01 DIAGNOSIS — R63.4 WEIGHT LOSS: ICD-10-CM

## 2024-10-01 DIAGNOSIS — K90.3 PANCREATIC STEATORRHEA: ICD-10-CM

## 2024-10-01 DIAGNOSIS — E43 SEVERE MALNUTRITION (HCC): ICD-10-CM

## 2024-10-01 DIAGNOSIS — C22.1 CHOLANGIOCARCINOMA (HCC): ICD-10-CM

## 2024-10-01 DIAGNOSIS — E53.8 B12 DEFICIENCY: ICD-10-CM

## 2024-10-01 DIAGNOSIS — R97.0 ELEVATED CEA: ICD-10-CM

## 2024-10-01 DIAGNOSIS — Z71.89 CARE PLAN DISCUSSED WITH PATIENT: ICD-10-CM

## 2024-10-01 LAB
ALBUMIN SERPL-MCNC: 3.2 G/DL (ref 3.5–5.2)
ALP SERPL-CCNC: 370 U/L (ref 35–104)
ALT SERPL-CCNC: 24 U/L (ref 5–33)
ANION GAP SERPL CALCULATED.3IONS-SCNC: 11 MMOL/L (ref 7–19)
AST SERPL-CCNC: 43 U/L (ref 5–32)
BASOPHILS # BLD: 0.03 K/UL (ref 0.01–0.08)
BASOPHILS NFR BLD: 0.4 % (ref 0.1–1.2)
BILIRUB SERPL-MCNC: 0.7 MG/DL (ref 0–1.2)
BUN SERPL-MCNC: 10 MG/DL (ref 8–23)
CALCIUM SERPL-MCNC: 8.3 MG/DL (ref 8.8–10.2)
CANCER AG19-9 SERPL-ACNC: 182 U/ML (ref 0–35)
CEA SERPL-MCNC: 8.8 NG/ML (ref 0–4.7)
CHLORIDE SERPL-SCNC: 99 MMOL/L (ref 98–107)
CO2 SERPL-SCNC: 22 MMOL/L (ref 22–29)
CREAT SERPL-MCNC: 0.6 MG/DL (ref 0.5–0.9)
EOSINOPHIL # BLD: 0.05 K/UL (ref 0.04–0.54)
EOSINOPHIL NFR BLD: 0.7 % (ref 0.7–7)
ERYTHROCYTE [DISTWIDTH] IN BLOOD BY AUTOMATED COUNT: 14.6 % (ref 11.7–14.4)
GLUCOSE SERPL-MCNC: 221 MG/DL (ref 70–99)
HCT VFR BLD AUTO: 35.4 % (ref 34.1–44.9)
HGB BLD-MCNC: 11.9 G/DL (ref 11.2–15.7)
LYMPHOCYTES # BLD: 0.99 K/UL (ref 1.18–3.74)
LYMPHOCYTES NFR BLD: 13.3 % (ref 19.3–53.1)
MCH RBC QN AUTO: 32.3 PG (ref 25.6–32.2)
MCHC RBC AUTO-ENTMCNC: 33.6 G/DL (ref 32.3–35.5)
MCV RBC AUTO: 96.2 FL (ref 79.4–94.8)
MONOCYTES # BLD: 0.72 K/UL (ref 0.24–0.82)
MONOCYTES NFR BLD: 9.7 % (ref 4.7–12.5)
NEUTROPHILS # BLD: 5.6 K/UL (ref 1.56–6.13)
NEUTS SEG NFR BLD: 75.2 % (ref 34–71.1)
PLATELET # BLD AUTO: 230 K/UL (ref 182–369)
PMV BLD AUTO: 10.5 FL (ref 7.4–10.4)
POTASSIUM SERPL-SCNC: 4.1 MMOL/L (ref 3.5–5.1)
PROT SERPL-MCNC: 6.8 G/DL (ref 6.4–8.3)
RBC # BLD AUTO: 3.68 M/UL (ref 3.93–5.22)
SODIUM SERPL-SCNC: 132 MMOL/L (ref 136–145)
T4 FREE SERPL-MCNC: 1.08 NG/DL (ref 0.93–1.7)
TSH SERPL DL<=0.005 MIU/L-ACNC: 1.29 UIU/ML (ref 0.27–4.2)
VIT B12 SERPL-MCNC: 1021 PG/ML (ref 232–1245)
WBC # BLD AUTO: 7.44 K/UL (ref 3.98–10.04)

## 2024-10-01 PROCEDURE — 1090F PRES/ABSN URINE INCON ASSESS: CPT | Performed by: INTERNAL MEDICINE

## 2024-10-01 PROCEDURE — 1036F TOBACCO NON-USER: CPT | Performed by: INTERNAL MEDICINE

## 2024-10-01 PROCEDURE — G8420 CALC BMI NORM PARAMETERS: HCPCS | Performed by: INTERNAL MEDICINE

## 2024-10-01 PROCEDURE — 85025 COMPLETE CBC W/AUTO DIFF WBC: CPT

## 2024-10-01 PROCEDURE — 36415 COLL VENOUS BLD VENIPUNCTURE: CPT

## 2024-10-01 PROCEDURE — G8399 PT W/DXA RESULTS DOCUMENT: HCPCS | Performed by: INTERNAL MEDICINE

## 2024-10-01 PROCEDURE — G2211 COMPLEX E/M VISIT ADD ON: HCPCS | Performed by: INTERNAL MEDICINE

## 2024-10-01 PROCEDURE — 99212 OFFICE O/P EST SF 10 MIN: CPT

## 2024-10-01 PROCEDURE — 1123F ACP DISCUSS/DSCN MKR DOCD: CPT | Performed by: INTERNAL MEDICINE

## 2024-10-01 PROCEDURE — G8484 FLU IMMUNIZE NO ADMIN: HCPCS | Performed by: INTERNAL MEDICINE

## 2024-10-01 PROCEDURE — 99214 OFFICE O/P EST MOD 30 MIN: CPT | Performed by: INTERNAL MEDICINE

## 2024-10-01 PROCEDURE — G8428 CUR MEDS NOT DOCUMENT: HCPCS | Performed by: INTERNAL MEDICINE

## 2024-10-01 PROCEDURE — 80053 COMPREHEN METABOLIC PANEL: CPT

## 2024-10-01 RX ORDER — PANCRELIPASE 60000; 12000; 38000 [USP'U]/1; [USP'U]/1; [USP'U]/1
12000 CAPSULE, DELAYED RELEASE PELLETS ORAL
Qty: 270 CAPSULE | Refills: 3 | Status: SHIPPED | OUTPATIENT
Start: 2024-10-01

## 2024-10-29 ENCOUNTER — OFFICE VISIT (OUTPATIENT)
Dept: HEMATOLOGY | Age: 79
End: 2024-10-29

## 2024-10-29 DIAGNOSIS — R19.7 DIARRHEA, UNSPECIFIED TYPE: ICD-10-CM

## 2024-10-29 DIAGNOSIS — R63.4 WEIGHT LOSS: ICD-10-CM

## 2024-10-29 DIAGNOSIS — E43 SEVERE MALNUTRITION (HCC): Primary | ICD-10-CM

## 2024-10-30 VITALS — BODY MASS INDEX: 18.8 KG/M2 | HEIGHT: 62 IN

## 2024-10-30 NOTE — PROGRESS NOTES
[facility] Oncology & Hematology  [address] [place] [address] [place] [address]  Phone: [phone]  Fax: [phone]    Luzma Mckeon MS, RD, LD   Natacha An 78 y.o. Unavailable  White (non-)  Diagnosis, staging, date of diagnosis: Cholangiocarcinoma, 2011  Current Treatment: None  Comprehensive Nutrition Assessment    Type and Reason for Visit:  Reassess    Malnutrition Assessment:  Malnutrition Status:  Severe malnutrition (10/30/24 1050)    Context:  Chronic Illness     Findings of the 6 clinical characteristics of malnutrition:  Weight Loss:  Greater than 10% over 6 months     Body Fat Loss:  Severe body fat loss Orbital, Triceps   Muscle Mass Loss:  Severe muscle mass loss Temples (temporalis), Clavicles (pectoralis & deltoids)    Nutrition Assessment:    CB 79 y/o female presents 10/30/24 for follow-up RD evaluation via telephone. Pt remains severely malnourished. Pt endorses continued loose stools, but notes some improvement since starting Creon. She admits she has not remembered to take Creon with all meals as prescribed.   She is prescribed 89007y lipase--75539w protease tablet TID with meals.     Diet History:  Pt continues eating 3 meals daily with snacks in between meals. She drinks Ensure at times but not daily.     Anthropometric Measures:  Height: 157.5 cm (5' 2\")  Ideal Body Weight (IBW): 110 lbs (50 kg)       Current Body Weight: 46.6 kg (102 lb 12.8 oz) (at 10/1/24 office visit), 93.5 % IBW.    Current BMI (kg/m2): 18.8  Wt Readings from Last 5 Encounters:   10/01/24 46.6 kg (102 lb 12.8 oz)   08/06/24 48.9 kg (107 lb 14.4 oz)   06/05/24 50.8 kg (112 lb)   05/15/24 54.4 kg (120 lb)   02/28/24 54.4 kg (119 lb 14.4 oz)        Estimated Daily Nutrient Needs:  Energy Requirements Based On: Kcal/kg  Weight Used for Energy Requirements: Current  Energy (kcal/day): 6189-3605 (30-35kcal/kg)  Weight Used for Protein Requirements: Current  Protein (g/day): 70-90

## 2024-12-30 DIAGNOSIS — C22.1 CHOLANGIOCARCINOMA (HCC): ICD-10-CM

## 2024-12-30 DIAGNOSIS — R97.0 ELEVATED CARCINOEMBRYONIC ANTIGEN (CEA): Primary | ICD-10-CM

## 2024-12-30 NOTE — PROGRESS NOTES
104 U/L 458  384  367  433  342  370  267    AST 5 - 32 U/L 100  69  52  60  45  43  46    ALT 5 - 33 U/L 51  43  25  29  19  24  25    Ca 8.8 - 10.2 mg/dL 8.4  8.7  8.1  8.7  8.1  8.3  8.3         Abdominal ascites-agree with spironolactone/Lasix.   As per primary team    Weight Loss/Physical Deconditioning:  -Referral to dietician Luzma Gutierrez for diet recommendations  -Recommend [facility] patient declined at this time  -10 pounds weight loss since June 2004  Wt Readings from Last 3 Encounters:   01/07/25 49.4 kg (109 lb)   10/01/24 46.6 kg (102 lb 12.8 oz)   08/06/24 48.9 kg (107 lb 14.4 oz)       PLAN: Yes  RTC with MD in 6 months   CBC,CMP, CEA , CA 19-9 Today and 1 week prior to MD visit  Continue Creon 06911 TID with meal  Continue follow-up with Luzma Gutierrez dietician for diet recommendations, 1/7/25  Continue  OTC Pepcid for acid reflux  Will notify clinic if fluid returns for paracentesis needs  Continue cholestyramine 4 g packet BID for generalized pruritus      Follow Up:   Return in about 6 months (around 7/7/2025) for NO LABS, Appointment with Dr. Vargas.  Sched Labs to be completed 1 week prior to MD visit    Rula BOURGEOIS am pre charting  as Medical Assistant for Isabel Vargas MD. Electronically signed by Rula Rosales MA on 1/7/2025 at 2:42 PM CST.    I have seen, examined and reviewed this patient medication list, appropriate labs and imaging studies. I reviewed relevant medical records and others physician’s notes. I discussed the plans of care with the patient. I answered all the questions to the patient’s satisfaction. I have also reviewed the chief complaint (CC) and part of the history (History of Present Illness (HPI), Past Family Social History (PFSH), or Review of Systems (ROS) and made changes when appropriated.       (Please note that portions of this note were completed with a voice recognition program. Efforts were made to edit the dictations but occasionally words

## 2025-01-03 ENCOUNTER — TELEPHONE (OUTPATIENT)
Dept: HEMATOLOGY | Age: 80
End: 2025-01-03

## 2025-01-03 NOTE — TELEPHONE ENCOUNTER
I called patient and reminded patient of their appt on 01/07/2025 and patient confirmed they would be here. I also let patient know that we have moved into our new cancer facility and asked patient if they were aware of where we were now located, and patient voiced understanding of our new location. Patient knows not to arrive early and that we will get labs at the time of the follow up appointment and not the lab appointment time. I also made patient aware to eat and drink plenty of water to hydrate properly before coming to these appointments because this will make their lab draw much easier. Also let her know that due to possible inclement weather, we may need to reschedule or start late, if this occurs we will contact them the night before or the day of from a blocked or unknown number so please be sure to answer your phone or check your voicemail.

## 2025-01-07 ENCOUNTER — HOSPITAL ENCOUNTER (OUTPATIENT)
Dept: INFUSION THERAPY | Age: 80
Discharge: HOME | End: 2025-01-07
Payer: MEDICARE

## 2025-01-07 ENCOUNTER — OFFICE VISIT (OUTPATIENT)
Dept: HEMATOLOGY | Age: 80
End: 2025-01-07
Payer: MEDICARE

## 2025-01-07 VITALS
DIASTOLIC BLOOD PRESSURE: 72 MMHG | HEART RATE: 72 BPM | SYSTOLIC BLOOD PRESSURE: 130 MMHG | BODY MASS INDEX: 20.06 KG/M2 | HEIGHT: 62 IN | WEIGHT: 109 LBS | OXYGEN SATURATION: 100 %

## 2025-01-07 VITALS — HEIGHT: 62 IN | BODY MASS INDEX: 19.94 KG/M2

## 2025-01-07 DIAGNOSIS — R53.83 OTHER FATIGUE: ICD-10-CM

## 2025-01-07 DIAGNOSIS — R63.4 WEIGHT LOSS: Primary | ICD-10-CM

## 2025-01-07 DIAGNOSIS — R14.0 ABDOMINAL DISTENTION: Primary | ICD-10-CM

## 2025-01-07 DIAGNOSIS — C22.1 CHOLANGIOCARCINOMA (HCC): ICD-10-CM

## 2025-01-07 DIAGNOSIS — R63.4 WEIGHT LOSS: ICD-10-CM

## 2025-01-07 DIAGNOSIS — R97.0 ELEVATED CARCINOEMBRYONIC ANTIGEN (CEA): ICD-10-CM

## 2025-01-07 DIAGNOSIS — R19.7 DIARRHEA, UNSPECIFIED TYPE: ICD-10-CM

## 2025-01-07 DIAGNOSIS — Z71.89 CARE PLAN DISCUSSED WITH PATIENT: ICD-10-CM

## 2025-01-07 LAB
ALBUMIN SERPL-MCNC: 3.2 G/DL (ref 3.5–5.2)
ALP SERPL-CCNC: 267 U/L (ref 35–104)
ALT SERPL-CCNC: 25 U/L (ref 5–33)
ANION GAP SERPL CALCULATED.3IONS-SCNC: 10 MMOL/L (ref 7–19)
AST SERPL-CCNC: 46 U/L (ref 5–32)
BASOPHILS # BLD: 0.05 K/UL (ref 0.01–0.08)
BASOPHILS NFR BLD: 0.6 % (ref 0.1–1.2)
BILIRUB SERPL-MCNC: 0.9 MG/DL (ref 0–1.2)
BUN SERPL-MCNC: 12 MG/DL (ref 8–23)
CALCIUM SERPL-MCNC: 8.3 MG/DL (ref 8.8–10.2)
CANCER AG19-9 SERPL-ACNC: 507 U/ML (ref 0–35)
CEA SERPL-MCNC: 7.8 NG/ML (ref 0–4.7)
CHLORIDE SERPL-SCNC: 102 MMOL/L (ref 98–107)
CO2 SERPL-SCNC: 21 MMOL/L (ref 22–29)
CREAT SERPL-MCNC: 0.6 MG/DL (ref 0.5–0.9)
EOSINOPHIL # BLD: 0.09 K/UL (ref 0.04–0.54)
EOSINOPHIL NFR BLD: 1.1 % (ref 0.7–7)
ERYTHROCYTE [DISTWIDTH] IN BLOOD BY AUTOMATED COUNT: 15.9 % (ref 11.7–14.4)
GLUCOSE SERPL-MCNC: 112 MG/DL (ref 70–99)
HCT VFR BLD AUTO: 35.5 % (ref 34.1–44.9)
HGB BLD-MCNC: 12 G/DL (ref 11.2–15.7)
LYMPHOCYTES # BLD: 1.44 K/UL (ref 1.18–3.74)
LYMPHOCYTES NFR BLD: 17.7 % (ref 19.3–53.1)
MCH RBC QN AUTO: 30.6 PG (ref 25.6–32.2)
MCHC RBC AUTO-ENTMCNC: 33.8 G/DL (ref 32.3–35.5)
MCV RBC AUTO: 90.6 FL (ref 79.4–94.8)
MONOCYTES # BLD: 0.81 K/UL (ref 0.24–0.82)
MONOCYTES NFR BLD: 10 % (ref 4.7–12.5)
NEUTROPHILS # BLD: 5.71 K/UL (ref 1.56–6.13)
NEUTS SEG NFR BLD: 70.1 % (ref 34–71.1)
PLATELET # BLD AUTO: 251 K/UL (ref 182–369)
PMV BLD AUTO: 11.1 FL (ref 7.4–10.4)
POTASSIUM SERPL-SCNC: 4.1 MMOL/L (ref 3.5–5.1)
PROT SERPL-MCNC: 6.6 G/DL (ref 6.4–8.3)
RBC # BLD AUTO: 3.92 M/UL (ref 3.93–5.22)
SODIUM SERPL-SCNC: 133 MMOL/L (ref 136–145)
WBC # BLD AUTO: 8.14 K/UL (ref 3.98–10.04)

## 2025-01-07 PROCEDURE — 1036F TOBACCO NON-USER: CPT | Performed by: INTERNAL MEDICINE

## 2025-01-07 PROCEDURE — 1125F AMNT PAIN NOTED PAIN PRSNT: CPT | Performed by: INTERNAL MEDICINE

## 2025-01-07 PROCEDURE — 36415 COLL VENOUS BLD VENIPUNCTURE: CPT

## 2025-01-07 PROCEDURE — G2211 COMPLEX E/M VISIT ADD ON: HCPCS | Performed by: INTERNAL MEDICINE

## 2025-01-07 PROCEDURE — 99213 OFFICE O/P EST LOW 20 MIN: CPT | Performed by: INTERNAL MEDICINE

## 2025-01-07 PROCEDURE — 80053 COMPREHEN METABOLIC PANEL: CPT

## 2025-01-07 PROCEDURE — G8427 DOCREV CUR MEDS BY ELIG CLIN: HCPCS | Performed by: INTERNAL MEDICINE

## 2025-01-07 PROCEDURE — G8420 CALC BMI NORM PARAMETERS: HCPCS | Performed by: INTERNAL MEDICINE

## 2025-01-07 PROCEDURE — 1159F MED LIST DOCD IN RCRD: CPT | Performed by: INTERNAL MEDICINE

## 2025-01-07 PROCEDURE — M1308 PR FLU IMMUNIZE NO ADMIN: HCPCS | Performed by: INTERNAL MEDICINE

## 2025-01-07 PROCEDURE — 1090F PRES/ABSN URINE INCON ASSESS: CPT | Performed by: INTERNAL MEDICINE

## 2025-01-07 PROCEDURE — 85025 COMPLETE CBC W/AUTO DIFF WBC: CPT

## 2025-01-07 PROCEDURE — G8399 PT W/DXA RESULTS DOCUMENT: HCPCS | Performed by: INTERNAL MEDICINE

## 2025-01-07 PROCEDURE — 1123F ACP DISCUSS/DSCN MKR DOCD: CPT | Performed by: INTERNAL MEDICINE

## 2025-01-07 NOTE — PROGRESS NOTES
[facility] Oncology & Hematology  [address] [place] [address] [place] [address]  Phone: [phone]  Fax: [phone]    Luzma Mckeon MS, RD, LD   Natacha NAOMY Nataliya 79 y.o. Unavailable  White (non-)  Diagnosis, staging, date of diagnosis: Cholangiocarcinoma, 2011  Current Treatment: None  Comprehensive Nutrition Assessment    Type and Reason for Visit:  Reassess    Malnutrition Assessment:  Malnutrition Status:  At risk for malnutrition (01/07/25 1449)    Context:  Chronic Illness       Nutrition Assessment:    CB 80 y/o female presents 1/7/25 for follow-up RD evaluation. Pt is improving from a nutritional standpoint. She has gained 7lbs from last office visit. Although, she reports some fluid retention in abdomen and BLE. Pt notes her appetite has remained great. She also reports her bowel movements have improved in terms of consistency, frequency, and color. She is taking Creon TID (68068 lipase units). Pt admits she forgets to take Creon at times.     Diet History:  Pt notes her appetite is great. She eats consistently throughout the day. Pt reports eating 3 meals and snacks in between each meal. She drinks Ensure at times but not daily.   Pt tries to avoid specific foods which seem to make diarrhea worse including pizza, chili, fried foods, toscano, butter. Pt does eat ice cream regularly.     Nutrition Related Laboratory Data:  Na+ 133, K+ 4.1, glu 112, BUN:Cr 20, GFR >90    Anthropometric Measures:  Height: 157.5 cm (5' 2\")  Ideal Body Weight (IBW): 110 lbs (50 kg)       Current Body Weight: 49.4 kg (109 lb), 99.1 % IBW.    Current BMI (kg/m2): 19.9  Wt Readings from Last 5 Encounters:   01/07/25 49.4 kg (109 lb)   10/01/24 46.6 kg (102 lb 12.8 oz)   08/06/24 48.9 kg (107 lb 14.4 oz)   06/05/24 50.8 kg (112 lb)   05/15/24 54.4 kg (120 lb)       Nutrition Diagnosis:   Unintended weight loss related to altered GI function as evidenced by diarrhea    Nutrition Interventions:   Food

## 2025-02-11 ENCOUNTER — TRANSCRIBE ORDERS (OUTPATIENT)
Dept: ADMINISTRATIVE | Facility: HOSPITAL | Age: 80
End: 2025-02-11
Payer: MEDICARE

## 2025-02-11 DIAGNOSIS — R18.8 OTHER ASCITES: Primary | ICD-10-CM

## 2025-02-25 ENCOUNTER — TELEPHONE (OUTPATIENT)
Dept: INTERVENTIONAL RADIOLOGY/VASCULAR | Facility: HOSPITAL | Age: 80
End: 2025-02-25

## 2025-02-26 ENCOUNTER — HOSPITAL ENCOUNTER (OUTPATIENT)
Dept: ULTRASOUND IMAGING | Facility: HOSPITAL | Age: 80
Discharge: HOME | End: 2025-02-26
Admitting: FAMILY MEDICINE
Payer: MEDICARE

## 2025-02-26 DIAGNOSIS — R18.8 OTHER ASCITES: ICD-10-CM

## 2025-02-26 PROCEDURE — 76705 ECHO EXAM OF ABDOMEN: CPT

## 2025-05-09 ENCOUNTER — TRANSCRIBE ORDERS (OUTPATIENT)
Dept: ADMINISTRATIVE | Facility: HOSPITAL | Age: 80
End: 2025-05-09
Payer: MEDICARE

## 2025-05-09 DIAGNOSIS — Z12.31 ENCOUNTER FOR SCREENING MAMMOGRAM FOR MALIGNANT NEOPLASM OF BREAST: Primary | ICD-10-CM

## 2025-05-20 ENCOUNTER — HOSPITAL ENCOUNTER (OUTPATIENT)
Dept: MAMMOGRAPHY | Facility: HOSPITAL | Age: 80
Discharge: HOME | End: 2025-05-20
Admitting: FAMILY MEDICINE
Payer: MEDICARE

## 2025-05-20 DIAGNOSIS — Z12.31 ENCOUNTER FOR SCREENING MAMMOGRAM FOR MALIGNANT NEOPLASM OF BREAST: ICD-10-CM

## 2025-05-20 PROCEDURE — 77067 SCR MAMMO BI INCL CAD: CPT

## 2025-05-20 PROCEDURE — 77063 BREAST TOMOSYNTHESIS BI: CPT

## 2025-06-25 DIAGNOSIS — K86.89 PANCREATIC INSUFFICIENCY: ICD-10-CM

## 2025-06-25 RX ORDER — PANCRELIPASE 60000; 12000; 38000 [USP'U]/1; [USP'U]/1; [USP'U]/1
12000 CAPSULE, DELAYED RELEASE PELLETS ORAL
Qty: 270 CAPSULE | Refills: 3 | Status: SHIPPED | OUTPATIENT
Start: 2025-06-25

## 2025-07-01 ENCOUNTER — HOSPITAL ENCOUNTER (OUTPATIENT)
Dept: INFUSION THERAPY | Age: 80
Discharge: HOME | End: 2025-07-01
Payer: MEDICARE

## 2025-07-01 DIAGNOSIS — C22.1 CHOLANGIOCARCINOMA (HCC): ICD-10-CM

## 2025-07-01 DIAGNOSIS — R97.0 ELEVATED CARCINOEMBRYONIC ANTIGEN (CEA): ICD-10-CM

## 2025-07-01 LAB
ALBUMIN SERPL-MCNC: 3.4 G/DL (ref 3.5–5.2)
ALP SERPL-CCNC: 288 U/L (ref 35–104)
ALT SERPL-CCNC: 33 U/L (ref 5–33)
ANION GAP SERPL CALCULATED.3IONS-SCNC: 10 MMOL/L (ref 7–19)
AST SERPL-CCNC: 59 U/L (ref 5–32)
BASOPHILS # BLD: 0.03 K/UL (ref 0–0.2)
BASOPHILS NFR BLD: 0.5 % (ref 0–1)
BILIRUB SERPL-MCNC: 1.3 MG/DL (ref 0–1.2)
BUN SERPL-MCNC: 13 MG/DL (ref 8–23)
CALCIUM SERPL-MCNC: 8.3 MG/DL (ref 8.8–10.2)
CANCER AG19-9 SERPL-ACNC: 167 U/ML (ref 0–35)
CEA SERPL-MCNC: 13.6 NG/ML (ref 0–4.7)
CHLORIDE SERPL-SCNC: 100 MMOL/L (ref 98–107)
CO2 SERPL-SCNC: 23 MMOL/L (ref 22–29)
CREAT SERPL-MCNC: 0.6 MG/DL (ref 0.5–0.9)
EOSINOPHIL # BLD: 0.03 K/UL (ref 0–0.6)
EOSINOPHIL NFR BLD: 0.5 % (ref 0–5)
ERYTHROCYTE [DISTWIDTH] IN BLOOD BY AUTOMATED COUNT: 15.6 % (ref 11.5–14.5)
GLUCOSE SERPL-MCNC: 114 MG/DL (ref 70–99)
HCT VFR BLD AUTO: 32.7 % (ref 37–47)
HGB BLD-MCNC: 11 G/DL (ref 12–16)
LYMPHOCYTES # BLD: 0.88 K/UL (ref 1.1–4.5)
LYMPHOCYTES NFR BLD: 13.8 % (ref 20–40)
MCH RBC QN AUTO: 30.7 PG (ref 27–31)
MCHC RBC AUTO-ENTMCNC: 33.6 G/DL (ref 33–37)
MCV RBC AUTO: 91.3 FL (ref 81–99)
MONOCYTES # BLD: 1.15 K/UL (ref 0–0.9)
MONOCYTES NFR BLD: 18 % (ref 1–10)
NEUTROPHILS # BLD: 4.28 K/UL (ref 1.5–7.5)
NEUTS SEG NFR BLD: 66.7 % (ref 50–65)
PLATELET # BLD AUTO: 176 K/UL (ref 130–400)
PMV BLD AUTO: 10.9 FL (ref 9.4–12.3)
POTASSIUM SERPL-SCNC: 4.6 MMOL/L (ref 3.5–5.1)
PROT SERPL-MCNC: 6.4 G/DL (ref 6.4–8.3)
RBC # BLD AUTO: 3.58 M/UL (ref 4.2–5.4)
SODIUM SERPL-SCNC: 133 MMOL/L (ref 136–145)
WBC # BLD AUTO: 6.4 K/UL (ref 4.8–10.8)

## 2025-07-01 PROCEDURE — 36415 COLL VENOUS BLD VENIPUNCTURE: CPT

## 2025-07-01 PROCEDURE — 85025 COMPLETE CBC W/AUTO DIFF WBC: CPT

## 2025-07-01 PROCEDURE — 82378 CARCINOEMBRYONIC ANTIGEN: CPT

## 2025-07-01 PROCEDURE — 80053 COMPREHEN METABOLIC PANEL: CPT

## 2025-07-01 PROCEDURE — 86301 IMMUNOASSAY TUMOR CA 19-9: CPT

## 2025-07-02 ENCOUNTER — TELEPHONE (OUTPATIENT)
Dept: HEMATOLOGY | Age: 80
End: 2025-07-02

## 2025-07-02 NOTE — TELEPHONE ENCOUNTER

## 2025-07-06 NOTE — PROGRESS NOTES
evidence of malignancy. Recommendation is for the patient to return for routine mammography in one year or sooner if clinically indicated. 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    of distal CBD    Hypercholesterolemia     patient states this is false    Hypertension     Insomnia     Menopausal state     Vitamin D deficiency      Past Surgical History:    Past Surgical History:   Procedure Laterality Date    CHOLECYSTECTOMY      COLONOSCOPY  05/22/2013    Dr Ross-Diverticulosis, hemorrhoids-Repeat in 5 yrs    COLONOSCOPY  07/26/2010    Dr Ross-HP x 1, BCM x 1    COLONOSCOPY  09/28/2021    Normal, 7 yr recall    COLONOSCOPY  07/26/2010    HP, diverticular disease, 3 yr recall    OTHER SURGICAL HISTORY      CBD exploration for carcinoma of bile duct    UPPER GASTROINTESTINAL ENDOSCOPY  01/2016    UPPER GASTROINTESTINAL ENDOSCOPY  02/01/2016    Normal    UPPER GASTROINTESTINAL ENDOSCOPY N/A 12/20/2023    Dr ACSS Mueller-w/EUS and asp of a small amount of ascitic fluid-Limited endoscopic and endosonographic evaluation due to patient's postoperative anatomy, unable to identify the described abnormal tissue from the CT scan-Benign path    WHIPPLE PROCEDURE W/ LAPAROSCOPY  06/2011    [place]     Social History:    Marital status:    Smoking status:Formerly; 1 pack daily; Quit 2011  ETOH status:Occasionally  Resides:[place], [place]    Family History:   Family History   Problem Relation Age of Onset    Breast Cancer Mother     Ovarian Cancer Mother     Breast Cancer Sister      Current Hospital Medications:    Current Outpatient Medications   Medication Sig Dispense Refill    lipase-protease-amylase (CREON) 92819-70912 units delayed release capsule Take 1 capsule

## 2025-07-08 ENCOUNTER — OFFICE VISIT (OUTPATIENT)
Dept: HEMATOLOGY | Age: 80
End: 2025-07-08
Payer: MEDICARE

## 2025-07-08 ENCOUNTER — HOSPITAL ENCOUNTER (OUTPATIENT)
Dept: INFUSION THERAPY | Age: 80
Discharge: HOME | End: 2025-07-08
Payer: MEDICARE

## 2025-07-08 VITALS
HEART RATE: 76 BPM | WEIGHT: 108.8 LBS | TEMPERATURE: 98.1 F | DIASTOLIC BLOOD PRESSURE: 64 MMHG | BODY MASS INDEX: 20.02 KG/M2 | HEIGHT: 62 IN | SYSTOLIC BLOOD PRESSURE: 122 MMHG | OXYGEN SATURATION: 98 %

## 2025-07-08 DIAGNOSIS — Z71.89 CARE PLAN DISCUSSED WITH PATIENT: Primary | ICD-10-CM

## 2025-07-08 DIAGNOSIS — R97.0 ELEVATED CARCINOEMBRYONIC ANTIGEN (CEA): ICD-10-CM

## 2025-07-08 DIAGNOSIS — C22.1 CHOLANGIOCARCINOMA (HCC): ICD-10-CM

## 2025-07-08 PROCEDURE — G2211 COMPLEX E/M VISIT ADD ON: HCPCS | Performed by: INTERNAL MEDICINE

## 2025-07-08 PROCEDURE — 1126F AMNT PAIN NOTED NONE PRSNT: CPT | Performed by: INTERNAL MEDICINE

## 2025-07-08 PROCEDURE — 1036F TOBACCO NON-USER: CPT | Performed by: INTERNAL MEDICINE

## 2025-07-08 PROCEDURE — 1090F PRES/ABSN URINE INCON ASSESS: CPT | Performed by: INTERNAL MEDICINE

## 2025-07-08 PROCEDURE — 99213 OFFICE O/P EST LOW 20 MIN: CPT | Performed by: INTERNAL MEDICINE

## 2025-07-08 PROCEDURE — G8420 CALC BMI NORM PARAMETERS: HCPCS | Performed by: INTERNAL MEDICINE

## 2025-07-08 PROCEDURE — 1159F MED LIST DOCD IN RCRD: CPT | Performed by: INTERNAL MEDICINE

## 2025-07-08 PROCEDURE — G8399 PT W/DXA RESULTS DOCUMENT: HCPCS | Performed by: INTERNAL MEDICINE

## 2025-07-08 PROCEDURE — G8427 DOCREV CUR MEDS BY ELIG CLIN: HCPCS | Performed by: INTERNAL MEDICINE

## 2025-07-08 PROCEDURE — 1123F ACP DISCUSS/DSCN MKR DOCD: CPT | Performed by: INTERNAL MEDICINE

## 2025-07-08 PROCEDURE — 99212 OFFICE O/P EST SF 10 MIN: CPT

## (undated) DEVICE — MASK,OXYGEN,MED CONC,ADLT,7' TUB, UC: Brand: PENDING

## (undated) DEVICE — CUFF,BP,DISP,1 TUBE,ADULT,HP: Brand: MEDLINE

## (undated) DEVICE — THE CHANNEL CLEANING BRUSH IS A NYLON FLEXI BRUSH ATTACHED TO A FLEXIBLE PLASTIC SHEATH DESIGNED TO SAFELY REMOVE DEBRIS FROM FLEXIBLE ENDOSCOPES.

## (undated) DEVICE — YANKAUER,BULB TIP WITH VENT: Brand: ARGYLE

## (undated) DEVICE — TBG SMPL FLTR LINE NASL 02/C02 A/ BX/100

## (undated) DEVICE — Device: Brand: DEFENDO AIR/WATER/SUCTION AND BIOPSY VALVE

## (undated) DEVICE — SENSR O2 OXIMAX FNGR A/ 18IN NONSTR